# Patient Record
Sex: FEMALE | Race: BLACK OR AFRICAN AMERICAN | NOT HISPANIC OR LATINO | Employment: FULL TIME | URBAN - METROPOLITAN AREA
[De-identification: names, ages, dates, MRNs, and addresses within clinical notes are randomized per-mention and may not be internally consistent; named-entity substitution may affect disease eponyms.]

---

## 2018-08-14 ENCOUNTER — OFFICE VISIT (OUTPATIENT)
Dept: OBSTETRICS AND GYNECOLOGY | Facility: CLINIC | Age: 58
End: 2018-08-14
Payer: MEDICAID

## 2018-08-14 VITALS
HEIGHT: 64 IN | DIASTOLIC BLOOD PRESSURE: 90 MMHG | WEIGHT: 240.31 LBS | SYSTOLIC BLOOD PRESSURE: 140 MMHG | BODY MASS INDEX: 41.03 KG/M2

## 2018-08-14 DIAGNOSIS — Z01.419 ENCOUNTER FOR GYNECOLOGICAL EXAMINATION WITHOUT ABNORMAL FINDING: Primary | ICD-10-CM

## 2018-08-14 DIAGNOSIS — Z12.31 VISIT FOR SCREENING MAMMOGRAM: ICD-10-CM

## 2018-08-14 PROCEDURE — 88175 CYTOPATH C/V AUTO FLUID REDO: CPT

## 2018-08-14 PROCEDURE — 99999 PR PBB SHADOW E&M-NEW PATIENT-LVL III: CPT | Mod: PBBFAC,,, | Performed by: OBSTETRICS & GYNECOLOGY

## 2018-08-14 PROCEDURE — 99386 PREV VISIT NEW AGE 40-64: CPT | Mod: S$PBB,,, | Performed by: OBSTETRICS & GYNECOLOGY

## 2018-08-14 PROCEDURE — 99203 OFFICE O/P NEW LOW 30 MIN: CPT | Mod: PBBFAC | Performed by: OBSTETRICS & GYNECOLOGY

## 2018-08-14 RX ORDER — HYDROCHLOROTHIAZIDE 12.5 MG/1
12.5 CAPSULE ORAL DAILY
COMMUNITY
End: 2021-08-06 | Stop reason: ALTCHOICE

## 2018-08-14 RX ORDER — AMLODIPINE BESYLATE AND ATORVASTATIN CALCIUM 10; 10 MG/1; MG/1
1 TABLET, FILM COATED ORAL DAILY
COMMUNITY
End: 2021-08-06

## 2018-08-14 RX ORDER — FLUTICASONE PROPIONATE 220 UG/1
1 AEROSOL, METERED RESPIRATORY (INHALATION) 2 TIMES DAILY
COMMUNITY
End: 2021-08-06 | Stop reason: SDUPTHER

## 2018-08-14 RX ORDER — ANASTROZOLE 1 MG/1
1 TABLET ORAL DAILY
COMMUNITY
End: 2024-01-05 | Stop reason: SDUPTHER

## 2018-08-20 NOTE — PROGRESS NOTES
HISTORY OF PRESENT ILLNESS:    Yeny Hargrove is a 58 y.o. female,   presents today for her routine visit and has no complaints.      Past Medical History:   Diagnosis Date    Asthma     Breast cancer     Hypertension        Past Surgical History:   Procedure Laterality Date    BREAST LUMPECTOMY      HYSTERECTOMY      MASTECTOMY         MEDICATIONS AND ALLERGIES:      Current Outpatient Medications:     amlodipine-atorvastatin (CADUET) 10-10 mg per tablet, Take 1 tablet by mouth once daily., Disp: , Rfl:     anastrozole (ARIMIDEX) 1 mg Tab, Take 1 mg by mouth once daily., Disp: , Rfl:     fluticasone (FLOVENT HFA) 220 mcg/actuation inhaler, Inhale 1 puff into the lungs 2 (two) times daily. Controller, Disp: , Rfl:     hydroCHLOROthiazide (MICROZIDE) 12.5 mg capsule, Take 12.5 mg by mouth once daily., Disp: , Rfl:     tiotropium Br/olodaterol HCl (STIOLTO RESPIMAT INHL), Inhale into the lungs., Disp: , Rfl:     Review of patient's allergies indicates:   Allergen Reactions    Iodinated contrast- oral and iv dye Swelling    Pcn [penicillins] Swelling       Family History   Problem Relation Age of Onset    Eclampsia Neg Hx     Diabetes Neg Hx     Colon cancer Neg Hx     Cancer Neg Hx     Breast cancer Neg Hx     Hypertension Neg Hx     Miscarriages / Stillbirths Neg Hx     Ovarian cancer Neg Hx      labor Neg Hx     Stroke Neg Hx        Social History     Socioeconomic History    Marital status: Single     Spouse name: Not on file    Number of children: Not on file    Years of education: Not on file    Highest education level: Not on file   Social Needs    Financial resource strain: Not on file    Food insecurity - worry: Not on file    Food insecurity - inability: Not on file    Transportation needs - medical: Not on file    Transportation needs - non-medical: Not on file   Occupational History    Not on file   Tobacco Use    Smoking status: Never Smoker    Smokeless tobacco:  "Never Used   Substance and Sexual Activity    Alcohol use: No     Frequency: Never    Drug use: No    Sexual activity: No     Birth control/protection: None   Other Topics Concern    Not on file   Social History Narrative    Not on file       COMPREHENSIVE GYN HISTORY:  PAP History: Denies abnormal Paps.  Infection History: Denies STDs. Denies PID.  Benign History: Denies uterine fibroids. Denies ovarian cysts. Denies endometriosis. Denies other conditions.  Cancer History: Denies cervical cancer. Denies uterine cancer or hyperplasia. Denies ovarian cancer. Denies vulvar cancer or pre-cancer. Denies vaginal cancer or pre-cancer. Denies breast cancer. Denies colon cancer.  Sexual Activity History: Reports currently being sexually active  Menstrual History: Denies menses. Pt is  not on ERT.     ROS:  GENERAL: No weight changes. No swelling. No fatigue. No fever.  CARDIOVASCULAR: No chest pain. No shortness of breath. No leg cramps.   NEUROLOGICAL: No headaches. No vision changes.  BREASTS: No pain. No lumps. No discharge.  ABDOMEN: No pain. No nausea. No vomiting. No diarrhea. No constipation.  REPRODUCTIVE: No abnormal bleeding.  VULVA: No pain. No lesions. No itching.  VAGINA: No relaxation. No itching. No odor. No discharge. No lesions.  URINARY: No incontinence. No nocturia. No frequency. No dysuria.    BP (!) 140/90   Ht 5' 4" (1.626 m)   Wt 109 kg (240 lb 4.8 oz)   LMP  (LMP Unknown)   BMI 41.25 kg/m²     PE:  APPEARANCE: Well nourished, well developed, in no acute distress.  AFFECT: WNL, alert and oriented x 3.  SKIN: No acne or hirsutism.  NECK: Neck symmetric without masses or thyromegaly.  NODES: No inguinal, cervical, axillary or femoral lymph node enlargement.  CHEST: Good respiratory effort.   ABDOMEN: Soft. No tenderness or masses. No hepatosplenomegaly. No hernias.  BREASTS: Symmetrical, no skin changes or visible lesions. No palpable masses, nipple discharge bilaterally.  PELVIC: ATROPHIC " EXTERNAL FEMALE GENITALIA without lesions. Normal hair distribution. Adequate perineal body, normal urethral meatus. VAGINA DRY without lesions or discharge. No significant cystocele or rectocele. Bimanual exam shows CERVIX and UTERUS to be SURGICALLY ABSENT. Adnexa without masses or tenderness.  EXTREMITIES: No edema.    DIAGNOSIS:  1. Encounter for gynecological examination without abnormal finding    2. Visit for screening mammogram        Orders Placed This Encounter    Mammo Digital Screening Bilat with CAD    Liquid-based pap smear, screening       COUNSELING:  The patient was counseled today on  -osteoporosis prevention, calcium supplementation, regular weight bearing exercise.  -ACS PAP guidelines (no paps), with recommendations for yearly pelvic exams as her uterus and cervix were removed for benign reasons and ovaries remain;  -recommendation for yearly mammogram;  -to see her primary care physician for all other health maintenance.    FOLLOW-UP with me for next routine visit.

## 2019-02-25 DIAGNOSIS — M25.559 HIP PAIN: Primary | ICD-10-CM

## 2019-02-25 DIAGNOSIS — M19.90 OSTEOARTHRITIS: ICD-10-CM

## 2019-02-25 DIAGNOSIS — M54.50 LOW BACK PAIN: ICD-10-CM

## 2019-02-25 DIAGNOSIS — M25.569 KNEE PAIN: ICD-10-CM

## 2021-08-06 ENCOUNTER — OFFICE VISIT (OUTPATIENT)
Dept: CARDIOLOGY | Facility: CLINIC | Age: 61
End: 2021-08-06
Payer: MEDICAID

## 2021-08-06 VITALS
DIASTOLIC BLOOD PRESSURE: 92 MMHG | HEIGHT: 64 IN | WEIGHT: 220.88 LBS | BODY MASS INDEX: 37.71 KG/M2 | OXYGEN SATURATION: 97 % | HEART RATE: 90 BPM | SYSTOLIC BLOOD PRESSURE: 140 MMHG

## 2021-08-06 DIAGNOSIS — E11.59 HYPERTENSION ASSOCIATED WITH DIABETES: ICD-10-CM

## 2021-08-06 DIAGNOSIS — R60.9 SWELLING: ICD-10-CM

## 2021-08-06 DIAGNOSIS — E78.5 HYPERLIPIDEMIA ASSOCIATED WITH TYPE 2 DIABETES MELLITUS: ICD-10-CM

## 2021-08-06 DIAGNOSIS — E11.69 HYPERLIPIDEMIA ASSOCIATED WITH TYPE 2 DIABETES MELLITUS: ICD-10-CM

## 2021-08-06 DIAGNOSIS — E11.9 DIABETES MELLITUS WITHOUT COMPLICATION: ICD-10-CM

## 2021-08-06 DIAGNOSIS — R06.02 SOB (SHORTNESS OF BREATH): Primary | ICD-10-CM

## 2021-08-06 DIAGNOSIS — E66.01 MORBID OBESITY: ICD-10-CM

## 2021-08-06 DIAGNOSIS — I15.2 HYPERTENSION ASSOCIATED WITH DIABETES: ICD-10-CM

## 2021-08-06 PROCEDURE — 99205 PR OFFICE/OUTPT VISIT, NEW, LEVL V, 60-74 MIN: ICD-10-PCS | Mod: S$GLB,,, | Performed by: INTERNAL MEDICINE

## 2021-08-06 PROCEDURE — 99205 OFFICE O/P NEW HI 60 MIN: CPT | Mod: S$GLB,,, | Performed by: INTERNAL MEDICINE

## 2021-08-06 RX ORDER — LORATADINE 10 MG/1
10 TABLET ORAL DAILY
COMMUNITY
Start: 2021-07-02

## 2021-08-06 RX ORDER — METFORMIN HYDROCHLORIDE 500 MG/1
500 TABLET ORAL 2 TIMES DAILY
COMMUNITY
Start: 2021-07-27

## 2021-08-06 RX ORDER — AMITRIPTYLINE HYDROCHLORIDE 25 MG/1
25 TABLET, FILM COATED ORAL NIGHTLY PRN
COMMUNITY

## 2021-08-06 RX ORDER — PROMETHAZINE HYDROCHLORIDE 6.25 MG/5ML
5 SYRUP ORAL
COMMUNITY
End: 2021-11-12

## 2021-08-06 RX ORDER — ALBUTEROL SULFATE 90 UG/1
AEROSOL, METERED RESPIRATORY (INHALATION)
COMMUNITY
End: 2024-01-05 | Stop reason: SDUPTHER

## 2021-08-06 RX ORDER — ALBUTEROL SULFATE 90 UG/1
AEROSOL, METERED RESPIRATORY (INHALATION)
COMMUNITY

## 2021-08-06 RX ORDER — DICLOFENAC SODIUM 50 MG/1
50 TABLET, DELAYED RELEASE ORAL 2 TIMES DAILY
COMMUNITY
Start: 2021-07-27

## 2021-08-06 RX ORDER — OMEPRAZOLE 40 MG/1
40 CAPSULE, DELAYED RELEASE ORAL DAILY
COMMUNITY
Start: 2021-04-01

## 2021-08-06 RX ORDER — ATORVASTATIN CALCIUM 20 MG/1
20 TABLET, FILM COATED ORAL DAILY
Qty: 30 TABLET | Refills: 11 | Status: SHIPPED | OUTPATIENT
Start: 2021-08-06 | End: 2021-09-29

## 2021-08-06 RX ORDER — TIOTROPIUM BROMIDE AND OLODATEROL 3.124; 2.736 UG/1; UG/1
SPRAY, METERED RESPIRATORY (INHALATION)
COMMUNITY
Start: 2021-03-28

## 2021-08-06 RX ORDER — FLUTICASONE PROPIONATE 50 MCG
SPRAY, SUSPENSION (ML) NASAL
COMMUNITY

## 2021-08-06 RX ORDER — BENZONATATE 100 MG/1
CAPSULE ORAL
COMMUNITY

## 2021-08-06 RX ORDER — FLUTICASONE PROPIONATE 220 UG/1
AEROSOL, METERED RESPIRATORY (INHALATION)
COMMUNITY

## 2021-08-06 RX ORDER — AMLODIPINE BESYLATE 10 MG/1
TABLET ORAL
COMMUNITY
Start: 2021-07-27

## 2021-08-06 RX ORDER — HYDROCHLOROTHIAZIDE 25 MG/1
TABLET ORAL
COMMUNITY
End: 2023-05-26

## 2021-08-06 RX ORDER — FERROUS SULFATE 325(65) MG
TABLET ORAL
COMMUNITY

## 2021-09-22 ENCOUNTER — TELEPHONE (OUTPATIENT)
Dept: CARDIOLOGY | Facility: CLINIC | Age: 61
End: 2021-09-22

## 2021-09-24 ENCOUNTER — HOSPITAL ENCOUNTER (OUTPATIENT)
Dept: CARDIOLOGY | Facility: CLINIC | Age: 61
Discharge: HOME | End: 2021-09-24
Payer: MEDICAID

## 2021-09-24 ENCOUNTER — HOSPITAL ENCOUNTER (OUTPATIENT)
Dept: CARDIOLOGY | Facility: HOSPITAL | Age: 61
Discharge: HOME | End: 2021-09-24
Attending: INTERNAL MEDICINE
Payer: MEDICAID

## 2021-09-24 VITALS
WEIGHT: 220 LBS | HEART RATE: 97 BPM | DIASTOLIC BLOOD PRESSURE: 63 MMHG | HEIGHT: 64 IN | BODY MASS INDEX: 37.56 KG/M2 | SYSTOLIC BLOOD PRESSURE: 130 MMHG

## 2021-09-24 VITALS
HEIGHT: 64 IN | DIASTOLIC BLOOD PRESSURE: 100 MMHG | SYSTOLIC BLOOD PRESSURE: 150 MMHG | BODY MASS INDEX: 37.56 KG/M2 | HEART RATE: 86 BPM | WEIGHT: 220 LBS

## 2021-09-24 DIAGNOSIS — R06.02 SOB (SHORTNESS OF BREATH): ICD-10-CM

## 2021-09-24 LAB
ASCENDING AORTA: 4.19 CM
AV INDEX (PROSTH): 0.68
AV MEAN GRADIENT: 5 MMHG
AV PEAK GRADIENT: 10 MMHG
AV VALVE AREA: 2.5 CM2
AV VELOCITY RATIO: 0.64
BSA FOR ECHO PROCEDURE: 2.12 M2
CFR FLOW - ANTERIOR: 1.94
CFR FLOW - INFERIOR: 1.93
CFR FLOW - LATERAL: 1.84
CFR FLOW - MAX: 2.97
CFR FLOW - MIN: 1.45
CFR FLOW - SEPTAL: 2.07
CFR FLOW - WHOLE HEART: 1.95
CV ECHO LV RWT: 0.42 CM
CV PHARM DOSE: 56 MG
CV STRESS BASE HR: 97 BPM
DIASTOLIC BLOOD PRESSURE: 63 MMHG
DOP CALC AO PEAK VEL: 1.62 M/S
DOP CALC AO VTI: 32.21 CM
DOP CALC LVOT AREA: 3.7 CM2
DOP CALC LVOT DIAMETER: 2.17 CM
DOP CALC LVOT PEAK VEL: 1.04 M/S
DOP CALC LVOT STROKE VOLUME: 80.55 CM3
DOP CALCLVOT PEAK VEL VTI: 21.79 CM
E WAVE DECELERATION TIME: 197.92 MSEC
E/A RATIO: 0.71
E/E' RATIO: 8.13 M/S
ECHO LV POSTERIOR WALL: 1 CM (ref 0.6–1.1)
EJECTION FRACTION- HIGH: 65 %
EJECTION FRACTION: 63 %
END DIASTOLIC INDEX-HIGH: 153 ML/M2
END DIASTOLIC INDEX-LOW: 93 ML/M2
END SYSTOLIC INDEX-HIGH: 71 ML/M2
END SYSTOLIC INDEX-LOW: 31 ML/M2
FRACTIONAL SHORTENING: 28 % (ref 28–44)
INTERVENTRICULAR SEPTUM: 1.13 CM (ref 0.6–1.1)
LA MAJOR: 6.44 CM
LA MINOR: 6.07 CM
LA WIDTH: 3.99 CM
LEFT ATRIUM SIZE: 4.21 CM
LEFT ATRIUM VOLUME INDEX MOD: 36 ML/M2
LEFT ATRIUM VOLUME INDEX: 43.7 ML/M2
LEFT ATRIUM VOLUME MOD: 73.4 CM3
LEFT ATRIUM VOLUME: 89.23 CM3
LEFT INTERNAL DIMENSION IN SYSTOLE: 3.41 CM (ref 2.1–4)
LEFT VENTRICLE DIASTOLIC VOLUME INDEX: 50.74 ML/M2
LEFT VENTRICLE DIASTOLIC VOLUME: 103.51 ML
LEFT VENTRICLE MASS INDEX: 88 G/M2
LEFT VENTRICLE SYSTOLIC VOLUME INDEX: 23.3 ML/M2
LEFT VENTRICLE SYSTOLIC VOLUME: 47.61 ML
LEFT VENTRICULAR INTERNAL DIMENSION IN DIASTOLE: 4.72 CM (ref 3.5–6)
LEFT VENTRICULAR MASS: 180.53 G
LV LATERAL E/E' RATIO: 7.22 M/S
LV SEPTAL E/E' RATIO: 9.29 M/S
MV A" WAVE DURATION": 10.66 MSEC
MV PEAK A VEL: 0.91 M/S
MV PEAK E VEL: 0.65 M/S
MV STENOSIS PRESSURE HALF TIME: 57.4 MS
MV VALVE AREA P 1/2 METHOD: 3.83 CM2
NUC REST DIASTOLIC VOLUME INDEX: 103
NUC REST EJECTION FRACTION: 70
NUC REST SYSTOLIC VOLUME INDEX: 31
NUC STRESS DIASTOLIC VOLUME INDEX: 109
NUC STRESS EJECTION FRACTION: 73 %
NUC STRESS SYSTOLIC VOLUME INDEX: 33
OHS CV CPX 85 PERCENT MAX PREDICTED HEART RATE MALE: 129
OHS CV CPX MAX PREDICTED HEART RATE: 152
OHS CV CPX PATIENT IS FEMALE: 1
OHS CV CPX PATIENT IS MALE: 0
OHS CV CPX PEAK DIASTOLIC BLOOD PRESSURE: 99 MMHG
OHS CV CPX PEAK HEAR RATE: 107 BPM
OHS CV CPX PEAK RATE PRESSURE PRODUCT: NORMAL
OHS CV CPX PEAK SYSTOLIC BLOOD PRESSURE: 154 MMHG
OHS CV CPX PERCENT MAX PREDICTED HEART RATE ACHIEVED: 70
OHS CV CPX RATE PRESSURE PRODUCT PRESENTING: NORMAL
PISA TR MAX VEL: 2.64 M/S
PULM VEIN S/D RATIO: 1.64
PV PEAK D VEL: 0.28 M/S
PV PEAK S VEL: 0.46 M/S
RA MAJOR: 4.96 CM
RA PRESSURE: 3 MMHG
RA WIDTH: 2.91 CM
REST FLOW - ANTERIOR: 1.92 CC/MIN/G
REST FLOW - INFERIOR: 1.6 CC/MIN/G
REST FLOW - LATERAL: 1.86 CC/MIN/G
REST FLOW - MAX: 2.49 CC/MIN/G
REST FLOW - MIN: 0.8 CC/MIN/G
REST FLOW - SEPTAL: 1.59 CC/MIN/G
REST FLOW - WHOLE HEART: 1.74 CC/MIN/G
RETIRED EF AND QEF - SEE NOTES: 53 %
RIGHT VENTRICULAR END-DIASTOLIC DIMENSION: 2.87 CM
SINUS: 3.19 CM
STJ: 3.72 CM
STRESS FLOW - ANTERIOR: 3.68 CC/MIN/G
STRESS FLOW - INFERIOR: 3.07 CC/MIN/G
STRESS FLOW - LATERAL: 3.41 CC/MIN/G
STRESS FLOW - MAX: 4.63 CC/MIN/G
STRESS FLOW - MIN: 2.04 CC/MIN/G
STRESS FLOW - SEPTAL: 3.23 CC/MIN/G
STRESS FLOW - WHOLE HEART: 3.35 CC/MIN/G
SYSTOLIC BLOOD PRESSURE: 130 MMHG
TDI LATERAL: 0.09 M/S
TDI SEPTAL: 0.07 M/S
TDI: 0.08 M/S
TR MAX PG: 28 MMHG
TRICUSPID ANNULAR PLANE SYSTOLIC EXCURSION: 1.46 CM
TV REST PULMONARY ARTERY PRESSURE: 31 MMHG

## 2021-09-24 PROCEDURE — 93016 CARDIAC PET SCAN STRESS (CUPID ONLY): ICD-10-PCS | Mod: ,,, | Performed by: INTERNAL MEDICINE

## 2021-09-24 PROCEDURE — 63600175 PHARM REV CODE 636 W HCPCS: Performed by: INTERNAL MEDICINE

## 2021-09-24 PROCEDURE — 93018 CARDIAC PET SCAN STRESS (CUPID ONLY): ICD-10-PCS | Mod: ,,, | Performed by: INTERNAL MEDICINE

## 2021-09-24 PROCEDURE — 93306 TTE W/DOPPLER COMPLETE: CPT

## 2021-09-24 PROCEDURE — 93016 CV STRESS TEST SUPVJ ONLY: CPT | Mod: ,,, | Performed by: INTERNAL MEDICINE

## 2021-09-24 PROCEDURE — 93018 CV STRESS TEST I&R ONLY: CPT | Mod: ,,, | Performed by: INTERNAL MEDICINE

## 2021-09-24 PROCEDURE — 78434 CARDIAC PET SCAN STRESS (CUPID ONLY): ICD-10-PCS | Mod: 26,,, | Performed by: INTERNAL MEDICINE

## 2021-09-24 PROCEDURE — 78431 MYOCRD IMG PET RST&STRS CT: CPT | Mod: 26,,, | Performed by: INTERNAL MEDICINE

## 2021-09-24 PROCEDURE — 93306 TTE W/DOPPLER COMPLETE: CPT | Mod: 26,,, | Performed by: INTERNAL MEDICINE

## 2021-09-24 PROCEDURE — 78434 AQMBF PET REST & RX STRESS: CPT

## 2021-09-24 PROCEDURE — 78434 AQMBF PET REST & RX STRESS: CPT | Mod: 26,,, | Performed by: INTERNAL MEDICINE

## 2021-09-24 PROCEDURE — 93306 ECHO (CUPID ONLY): ICD-10-PCS | Mod: 26,,, | Performed by: INTERNAL MEDICINE

## 2021-09-24 PROCEDURE — 78431 CARDIAC PET SCAN STRESS (CUPID ONLY): ICD-10-PCS | Mod: 26,,, | Performed by: INTERNAL MEDICINE

## 2021-09-24 RX ORDER — DIPYRIDAMOLE 5 MG/ML
56 INJECTION INTRAVENOUS ONCE
Status: COMPLETED | OUTPATIENT
Start: 2021-09-24 | End: 2021-09-24

## 2021-09-24 RX ADMIN — DIPYRIDAMOLE 56 MG: 5 INJECTION INTRAVENOUS at 11:09

## 2021-09-29 ENCOUNTER — TELEPHONE (OUTPATIENT)
Dept: CARDIOLOGY | Facility: CLINIC | Age: 61
End: 2021-09-29

## 2021-09-29 RX ORDER — ATORVASTATIN CALCIUM 80 MG/1
80 TABLET, FILM COATED ORAL DAILY
Qty: 30 TABLET | Refills: 11 | Status: SHIPPED | OUTPATIENT
Start: 2021-09-29 | End: 2022-12-08 | Stop reason: SDUPTHER

## 2021-11-12 ENCOUNTER — OFFICE VISIT (OUTPATIENT)
Dept: CARDIOLOGY | Facility: CLINIC | Age: 61
End: 2021-11-12
Payer: MEDICAID

## 2021-11-12 VITALS
DIASTOLIC BLOOD PRESSURE: 98 MMHG | WEIGHT: 222.69 LBS | HEIGHT: 64 IN | HEART RATE: 95 BPM | BODY MASS INDEX: 38.02 KG/M2 | SYSTOLIC BLOOD PRESSURE: 138 MMHG | OXYGEN SATURATION: 100 %

## 2021-11-12 DIAGNOSIS — Z71.2 ENCOUNTER TO DISCUSS TEST RESULTS: Primary | ICD-10-CM

## 2021-11-12 PROCEDURE — 99499 UNLISTED E&M SERVICE: CPT | Mod: S$GLB,,, | Performed by: INTERNAL MEDICINE

## 2021-11-12 PROCEDURE — 99499 NO LOS: ICD-10-PCS | Mod: S$GLB,,, | Performed by: INTERNAL MEDICINE

## 2021-11-12 RX ORDER — ACETAMINOPHEN AND CODEINE PHOSPHATE 300; 15 MG/1; MG/1
1 TABLET ORAL EVERY 4 HOURS PRN
COMMUNITY
End: 2024-01-05

## 2022-02-09 ENCOUNTER — OUTSIDE PLACE OF SERVICE (OUTPATIENT)
Dept: CARDIOLOGY | Facility: CLINIC | Age: 62
End: 2022-02-09
Payer: MEDICAID

## 2022-02-09 PROCEDURE — 93010 ELECTROCARDIOGRAM REPORT: CPT | Mod: ,,, | Performed by: INTERNAL MEDICINE

## 2022-02-09 PROCEDURE — 93010 PR ELECTROCARDIOGRAM REPORT: ICD-10-PCS | Mod: ,,, | Performed by: INTERNAL MEDICINE

## 2022-02-18 ENCOUNTER — OFFICE VISIT (OUTPATIENT)
Dept: CARDIOLOGY | Facility: CLINIC | Age: 62
End: 2022-02-18
Payer: MEDICAID

## 2022-02-18 VITALS
HEART RATE: 101 BPM | HEIGHT: 64 IN | OXYGEN SATURATION: 100 % | SYSTOLIC BLOOD PRESSURE: 133 MMHG | WEIGHT: 229.63 LBS | BODY MASS INDEX: 39.2 KG/M2 | DIASTOLIC BLOOD PRESSURE: 87 MMHG

## 2022-02-18 DIAGNOSIS — E11.9 DIABETES MELLITUS WITHOUT COMPLICATION: ICD-10-CM

## 2022-02-18 DIAGNOSIS — E78.5 HYPERLIPIDEMIA ASSOCIATED WITH TYPE 2 DIABETES MELLITUS: ICD-10-CM

## 2022-02-18 DIAGNOSIS — R06.02 SOB (SHORTNESS OF BREATH): ICD-10-CM

## 2022-02-18 DIAGNOSIS — E11.59 HYPERTENSION ASSOCIATED WITH DIABETES: ICD-10-CM

## 2022-02-18 DIAGNOSIS — I48.91 ATRIAL FIBRILLATION, UNSPECIFIED TYPE: ICD-10-CM

## 2022-02-18 DIAGNOSIS — I15.2 HYPERTENSION ASSOCIATED WITH DIABETES: ICD-10-CM

## 2022-02-18 DIAGNOSIS — R60.9 SWELLING: ICD-10-CM

## 2022-02-18 DIAGNOSIS — E11.69 HYPERLIPIDEMIA ASSOCIATED WITH TYPE 2 DIABETES MELLITUS: ICD-10-CM

## 2022-02-18 DIAGNOSIS — R00.2 PALPITATIONS: Primary | ICD-10-CM

## 2022-02-18 PROCEDURE — 99215 PR OFFICE/OUTPT VISIT, EST, LEVL V, 40-54 MIN: ICD-10-PCS | Mod: S$GLB,,, | Performed by: INTERNAL MEDICINE

## 2022-02-18 PROCEDURE — 99215 OFFICE O/P EST HI 40 MIN: CPT | Mod: S$GLB,,, | Performed by: INTERNAL MEDICINE

## 2022-02-18 PROCEDURE — 3008F BODY MASS INDEX DOCD: CPT | Mod: CPTII,S$GLB,, | Performed by: INTERNAL MEDICINE

## 2022-02-18 PROCEDURE — 1159F MED LIST DOCD IN RCRD: CPT | Mod: CPTII,S$GLB,, | Performed by: INTERNAL MEDICINE

## 2022-02-18 PROCEDURE — 1160F RVW MEDS BY RX/DR IN RCRD: CPT | Mod: CPTII,S$GLB,, | Performed by: INTERNAL MEDICINE

## 2022-02-18 PROCEDURE — 3008F PR BODY MASS INDEX (BMI) DOCUMENTED: ICD-10-PCS | Mod: CPTII,S$GLB,, | Performed by: INTERNAL MEDICINE

## 2022-02-18 PROCEDURE — 1160F PR REVIEW ALL MEDS BY PRESCRIBER/CLIN PHARMACIST DOCUMENTED: ICD-10-PCS | Mod: CPTII,S$GLB,, | Performed by: INTERNAL MEDICINE

## 2022-02-18 PROCEDURE — 3075F PR MOST RECENT SYSTOLIC BLOOD PRESS GE 130-139MM HG: ICD-10-PCS | Mod: CPTII,S$GLB,, | Performed by: INTERNAL MEDICINE

## 2022-02-18 PROCEDURE — 3075F SYST BP GE 130 - 139MM HG: CPT | Mod: CPTII,S$GLB,, | Performed by: INTERNAL MEDICINE

## 2022-02-18 PROCEDURE — 3079F PR MOST RECENT DIASTOLIC BLOOD PRESSURE 80-89 MM HG: ICD-10-PCS | Mod: CPTII,S$GLB,, | Performed by: INTERNAL MEDICINE

## 2022-02-18 PROCEDURE — 1159F PR MEDICATION LIST DOCUMENTED IN MEDICAL RECORD: ICD-10-PCS | Mod: CPTII,S$GLB,, | Performed by: INTERNAL MEDICINE

## 2022-02-18 PROCEDURE — 3079F DIAST BP 80-89 MM HG: CPT | Mod: CPTII,S$GLB,, | Performed by: INTERNAL MEDICINE

## 2022-02-18 RX ORDER — PROMETHAZINE HYDROCHLORIDE AND CODEINE PHOSPHATE 6.25; 1 MG/5ML; MG/5ML
5 SOLUTION ORAL
COMMUNITY

## 2022-02-18 RX ORDER — BACLOFEN 5 MG/1
TABLET ORAL
COMMUNITY

## 2022-02-18 RX ORDER — METOPROLOL SUCCINATE 25 MG/1
25 TABLET, EXTENDED RELEASE ORAL DAILY
Qty: 30 TABLET | Refills: 11 | Status: SHIPPED | OUTPATIENT
Start: 2022-02-18 | End: 2022-03-11

## 2022-02-18 RX ORDER — AMOXICILLIN 875 MG/1
875 TABLET, FILM COATED ORAL 2 TIMES DAILY
COMMUNITY
Start: 2022-02-01 | End: 2024-01-05

## 2022-02-18 RX ORDER — FUROSEMIDE 20 MG/1
TABLET ORAL
COMMUNITY
End: 2022-10-07

## 2022-02-18 NOTE — PROGRESS NOTES
Cardiology Clinic note    Subjective:   Patient ID:  Yeny Hargrove is a 62 y.o. female who presents for irregular heart beat evaluation    HPI:   SOB, chest discomfort  She reports this can be related to exertion, but not always the case. End of January 2021 had COVID, and since then breathing has been difficult. Has been following with pulmonologist for SOB as she has h/o asthma.    Palpitations  Reports was told has irregular heart beat. EKG this month from [facility] ED reviewed. Noted PACs. No Afib. Was told by social security physician she has an irregular heart beat, but reports no EKG was done.    HTN: On medications    HLD: Tolerating statin    SH Tobacco Never used  FH No premature CAD    There are no problems to display for this patient.  PMH  DM  HTN  HLD    Patient's Medications   New Prescriptions    METOPROLOL SUCCINATE (TOPROL-XL) 25 MG 24 HR TABLET    Take 1 tablet (25 mg total) by mouth once daily.    RIVAROXABAN (XARELTO) 20 MG TAB    Take 1 tablet (20 mg total) by mouth once daily.   Previous Medications    ACETAMINOPHEN-CODEINE 300-15MG (TYLENOL #2) 300-15 MG PER TABLET    Take 1 tablet by mouth every 4 (four) hours as needed for Pain.    ALBUTEROL (PROVENTIL/VENTOLIN HFA) 90 MCG/ACTUATION INHALER    Ventolin HFA 90 mcg/actuation aerosol inhaler   Inhale 2 puffs 4 times a day by inhalation route.    ALBUTEROL (PROVENTIL/VENTOLIN HFA) 90 MCG/ACTUATION INHALER    albuterol sulfate HFA 90 mcg/actuation aerosol inhaler    ALLERGY RELIEF, LORATADINE, 10 MG TABLET    Take 10 mg by mouth once daily.    AMITRIPTYLINE (ELAVIL) 25 MG TABLET    Take 25 mg by mouth nightly as needed for Insomnia.    AMLODIPINE (NORVASC) 10 MG TABLET        AMOXICILLIN (AMOXIL) 875 MG TABLET    Take 875 mg by mouth 2 (two) times daily.    ANASTROZOLE (ARIMIDEX) 1 MG TAB    Take 1 mg by mouth once daily.    ATORVASTATIN (LIPITOR) 80 MG TABLET    Take 1 tablet (80 mg total) by mouth once daily.    BACLOFEN (LIORESAL) 5 MG TAB  TABLET    baclofen 5 mg tablet    BENZONATATE (TESSALON) 100 MG CAPSULE    Tessalon Perles 100 mg capsule   Take 1 capsule every day by oral route for 30 days.    DICLOFENAC (VOLTAREN) 50 MG EC TABLET    Take 50 mg by mouth 2 (two) times daily.    FERROUS SULFATE (FEOSOL) 325 MG (65 MG IRON) TAB TABLET    FeroSul 325 mg (65 mg iron) tablet    FLUTICASONE PROPIONATE (FLONASE) 50 MCG/ACTUATION NASAL SPRAY    fluticasone propionate 50 mcg/actuation nasal spray,suspension    FLUTICASONE PROPIONATE (FLOVENT HFA) 220 MCG/ACTUATION INHALER    Flovent  mcg/actuation aerosol inhaler   Inhale 2 puffs twice a day by inhalation route for 30 days.    FUROSEMIDE (LASIX) 20 MG TABLET    furosemide 20 mg tablet    HYDROCHLOROTHIAZIDE (HYDRODIURIL) 25 MG TABLET    hydrochlorothiazide 25 mg tablet   Take 1 tablet every day by oral route for 30 days.    LINACLOTIDE (LINZESS) 145 MCG CAP CAPSULE    Linzess 145 mcg capsule   Take 1 capsule every day by oral route in the morning for 90 days.    METFORMIN (GLUCOPHAGE) 500 MG TABLET    Take 500 mg by mouth 2 (two) times daily.    OMEPRAZOLE (PRILOSEC) 40 MG CAPSULE    Take 40 mg by mouth once daily.    PROMETHAZINE-CODEINE 6.25-10 MG/5 ML (PHENERGAN WITH CODEINE) 6.25-10 MG/5 ML SYRUP    5 mLs.    STIOLTO RESPIMAT 2.5-2.5 MCG/ACTUATION MIST    SMARTSI Puff(s) Via Inhaler Daily   Modified Medications    No medications on file   Discontinued Medications    No medications on file        Review of Systems   Constitutional: Negative for fever.   HENT: Negative for nosebleeds.    Cardiovascular: Negative for leg swelling and syncope.        As above   Respiratory: Negative for hemoptysis.    Hematologic/Lymphatic: Negative for bleeding problem.   Musculoskeletal: Positive for arthritis.   Gastrointestinal: Negative for hematochezia.   Genitourinary: Negative for hematuria.   Neurological: Negative for seizures.   Allergic/Immunologic: Positive for environmental allergies.  "        Objective:   Vitals  Vitals:    02/18/22 0846   BP: 133/87   Pulse: 101   SpO2: 100%   Weight: 104.1 kg (229 lb 9.6 oz)   Height: 5' 4" (1.626 m)          Physical Exam  Constitutional:       General: She is not in acute distress.     Appearance: She is well-developed. She is not diaphoretic.   HENT:      Head: Normocephalic.   Neck:      Vascular: No JVD.   Cardiovascular:      Rate and Rhythm: Normal rate. Rhythm irregular.      Heart sounds: No murmur heard.  No friction rub. No gallop.    Pulmonary:      Effort: Pulmonary effort is normal. No respiratory distress.      Breath sounds: Normal breath sounds.   Abdominal:      Palpations: Abdomen is soft.      Tenderness: There is no abdominal tenderness.   Musculoskeletal:         General: No swelling.      Cervical back: Normal range of motion.   Skin:     General: Skin is warm.   Neurological:      Mental Status: She is alert.   Psychiatric:         Mood and Affect: Mood normal.             Assessment:     1. Palpitations    2. SOB (shortness of breath)    3. Diabetes mellitus without complication    4. Hypertension associated with diabetes    5. Hyperlipidemia associated with type 2 diabetes mellitus    6. Swelling    7. Atrial fibrillation, unspecified type        Plan:   Yeny Hargrove is a 62 y.o. female h/o DM, HTN, HLD  Asthma  Breast cancer    Used to follow with Dr Sharp (used to be in [place]- now moved [place]). She wanted to stay local and made this appointment.    Reports had a stress test some years back which she reports she had a scar attributed to her prior radiation treatment (h/o breast cancer 2007 XRT, sp mastectomy 2018)    EKG personally reviewed. My interpretation:  2/18/22: Afib, normal axis. HR 110s. Septal infarct. Non-sp ST-T abn. QTc 473  8/6/21: SR 80s, normal axis. PAC noted. Otherwise normal EKG. QTc 445    Palpitations  - CHADS2-VASc 3 (HTN, DM, female)  - Rivaroxaban  - TAYLA/DCCV discussed - she would like to defer for now. " "FUP 2 weeks with EKG. Reconsider TAYLA/DCCV  - BB given    SOB, chest discomfort  - Echo 2017: LVEF 60-65%, DD1. Normal RVSF. PASP 43. Mild MR, TR  - Echo 2021: LVEF 63%, DD indeterminate. Normal RVSF. Mod LAE, mild AGUSTÍN. PASP 31, CVP 3. Asc aorta mildly dilated. Trivial pericardial effusion  - PET stress test 2021: No ischemia. CT attenuation images demonstrate mild diffuse coronary calcifications in the LAD and RCA territory.  - Discussed angiogram for persistent symptoms. She is not in favor of this. Does report decline in functional capacity    Ascending aorta mildly dilated  Repeat echo Sept 2022    DM  No results found for: XOZH4OHDQ, HGBA1C, HGBA1C, HGBGLY, HGBA1C, HGBA1C, HGBA1C, HGBA1C, HGBA1C   Managed by PCP    HTN  BP well controlled  Amlodipine, HCTZ  Salt restriction    HLD  No results found for: LDLCALC, LDLEXT   Statin as above  Fasting lipid profile ordered    Obesity  Estimated body mass index is 39.41 kg/m² as calculated from the following:    Height as of this encounter: 5' 4" (1.626 m).    Weight as of this encounter: 104.1 kg (229 lb 9.6 oz).  Discussed diet and exercise    Continue with current medical plan and lifestyle changes.    Orders Placed This Encounter   Procedures    Lipid Panel     Standing Status:   Future     Standing Expiration Date:   4/19/2023    Cardiac event monitor     Standing Status:   Future     Standing Expiration Date:   2/18/2023     Order Specific Question:   Cardiac Event Monitor     Answer:   Auto Trigger     Order Specific Question:   Release to patient     Answer:   Immediate       Follow up as scheduled  Return sooner for concerns or questions. If symptoms persist go to the ED    She expressed verbal understanding and agreed with the plan      Shanika Benavidez MD  Interventional Cardiology  [facility]  Phone: [phone]    "

## 2022-02-18 NOTE — H&P (VIEW-ONLY)
Cardiology Clinic note    Subjective:   Patient ID:  Yeny Hargrove is a 62 y.o. female who presents for irregular heart beat evaluation    HPI:   SOB, chest discomfort  She reports this can be related to exertion, but not always the case. End of January 2021 had COVID, and since then breathing has been difficult. Has been following with pulmonologist for SOB as she has h/o asthma.    Palpitations  Reports was told has irregular heart beat. EKG this month from [facility] ED reviewed. Noted PACs. No Afib. Was told by social security physician she has an irregular heart beat, but reports no EKG was done.    HTN: On medications    HLD: Tolerating statin    SH Tobacco Never used  FH No premature CAD    There are no problems to display for this patient.  PMH  DM  HTN  HLD    Patient's Medications   New Prescriptions    METOPROLOL SUCCINATE (TOPROL-XL) 25 MG 24 HR TABLET    Take 1 tablet (25 mg total) by mouth once daily.    RIVAROXABAN (XARELTO) 20 MG TAB    Take 1 tablet (20 mg total) by mouth once daily.   Previous Medications    ACETAMINOPHEN-CODEINE 300-15MG (TYLENOL #2) 300-15 MG PER TABLET    Take 1 tablet by mouth every 4 (four) hours as needed for Pain.    ALBUTEROL (PROVENTIL/VENTOLIN HFA) 90 MCG/ACTUATION INHALER    Ventolin HFA 90 mcg/actuation aerosol inhaler   Inhale 2 puffs 4 times a day by inhalation route.    ALBUTEROL (PROVENTIL/VENTOLIN HFA) 90 MCG/ACTUATION INHALER    albuterol sulfate HFA 90 mcg/actuation aerosol inhaler    ALLERGY RELIEF, LORATADINE, 10 MG TABLET    Take 10 mg by mouth once daily.    AMITRIPTYLINE (ELAVIL) 25 MG TABLET    Take 25 mg by mouth nightly as needed for Insomnia.    AMLODIPINE (NORVASC) 10 MG TABLET        AMOXICILLIN (AMOXIL) 875 MG TABLET    Take 875 mg by mouth 2 (two) times daily.    ANASTROZOLE (ARIMIDEX) 1 MG TAB    Take 1 mg by mouth once daily.    ATORVASTATIN (LIPITOR) 80 MG TABLET    Take 1 tablet (80 mg total) by mouth once daily.    BACLOFEN (LIORESAL) 5 MG TAB  TABLET    baclofen 5 mg tablet    BENZONATATE (TESSALON) 100 MG CAPSULE    Tessalon Perles 100 mg capsule   Take 1 capsule every day by oral route for 30 days.    DICLOFENAC (VOLTAREN) 50 MG EC TABLET    Take 50 mg by mouth 2 (two) times daily.    FERROUS SULFATE (FEOSOL) 325 MG (65 MG IRON) TAB TABLET    FeroSul 325 mg (65 mg iron) tablet    FLUTICASONE PROPIONATE (FLONASE) 50 MCG/ACTUATION NASAL SPRAY    fluticasone propionate 50 mcg/actuation nasal spray,suspension    FLUTICASONE PROPIONATE (FLOVENT HFA) 220 MCG/ACTUATION INHALER    Flovent  mcg/actuation aerosol inhaler   Inhale 2 puffs twice a day by inhalation route for 30 days.    FUROSEMIDE (LASIX) 20 MG TABLET    furosemide 20 mg tablet    HYDROCHLOROTHIAZIDE (HYDRODIURIL) 25 MG TABLET    hydrochlorothiazide 25 mg tablet   Take 1 tablet every day by oral route for 30 days.    LINACLOTIDE (LINZESS) 145 MCG CAP CAPSULE    Linzess 145 mcg capsule   Take 1 capsule every day by oral route in the morning for 90 days.    METFORMIN (GLUCOPHAGE) 500 MG TABLET    Take 500 mg by mouth 2 (two) times daily.    OMEPRAZOLE (PRILOSEC) 40 MG CAPSULE    Take 40 mg by mouth once daily.    PROMETHAZINE-CODEINE 6.25-10 MG/5 ML (PHENERGAN WITH CODEINE) 6.25-10 MG/5 ML SYRUP    5 mLs.    STIOLTO RESPIMAT 2.5-2.5 MCG/ACTUATION MIST    SMARTSI Puff(s) Via Inhaler Daily   Modified Medications    No medications on file   Discontinued Medications    No medications on file        Review of Systems   Constitutional: Negative for fever.   HENT: Negative for nosebleeds.    Cardiovascular: Negative for leg swelling and syncope.        As above   Respiratory: Negative for hemoptysis.    Hematologic/Lymphatic: Negative for bleeding problem.   Musculoskeletal: Positive for arthritis.   Gastrointestinal: Negative for hematochezia.   Genitourinary: Negative for hematuria.   Neurological: Negative for seizures.   Allergic/Immunologic: Positive for environmental allergies.  "        Objective:   Vitals  Vitals:    02/18/22 0846   BP: 133/87   Pulse: 101   SpO2: 100%   Weight: 104.1 kg (229 lb 9.6 oz)   Height: 5' 4" (1.626 m)          Physical Exam  Constitutional:       General: She is not in acute distress.     Appearance: She is well-developed. She is not diaphoretic.   HENT:      Head: Normocephalic.   Neck:      Vascular: No JVD.   Cardiovascular:      Rate and Rhythm: Normal rate. Rhythm irregular.      Heart sounds: No murmur heard.  No friction rub. No gallop.    Pulmonary:      Effort: Pulmonary effort is normal. No respiratory distress.      Breath sounds: Normal breath sounds.   Abdominal:      Palpations: Abdomen is soft.      Tenderness: There is no abdominal tenderness.   Musculoskeletal:         General: No swelling.      Cervical back: Normal range of motion.   Skin:     General: Skin is warm.   Neurological:      Mental Status: She is alert.   Psychiatric:         Mood and Affect: Mood normal.             Assessment:     1. Palpitations    2. SOB (shortness of breath)    3. Diabetes mellitus without complication    4. Hypertension associated with diabetes    5. Hyperlipidemia associated with type 2 diabetes mellitus    6. Swelling    7. Atrial fibrillation, unspecified type        Plan:   Yeny Hargrove is a 62 y.o. female h/o DM, HTN, HLD  Asthma  Breast cancer    Used to follow with Dr Sharp (used to be in [place]- now moved [place]). She wanted to stay local and made this appointment.    Reports had a stress test some years back which she reports she had a scar attributed to her prior radiation treatment (h/o breast cancer 2007 XRT, sp mastectomy 2018)    EKG personally reviewed. My interpretation:  2/18/22: Afib, normal axis. HR 110s. Septal infarct. Non-sp ST-T abn. QTc 473  8/6/21: SR 80s, normal axis. PAC noted. Otherwise normal EKG. QTc 445    Palpitations  - CHADS2-VASc 3 (HTN, DM, female)  - Rivaroxaban  - TAYLA/DCCV discussed - she would like to defer for now. " "FUP 2 weeks with EKG. Reconsider TAYLA/DCCV  - BB given    SOB, chest discomfort  - Echo 2017: LVEF 60-65%, DD1. Normal RVSF. PASP 43. Mild MR, TR  - Echo 2021: LVEF 63%, DD indeterminate. Normal RVSF. Mod LAE, mild AGUSTÍN. PASP 31, CVP 3. Asc aorta mildly dilated. Trivial pericardial effusion  - PET stress test 2021: No ischemia. CT attenuation images demonstrate mild diffuse coronary calcifications in the LAD and RCA territory.  - Discussed angiogram for persistent symptoms. She is not in favor of this. Does report decline in functional capacity    Ascending aorta mildly dilated  Repeat echo Sept 2022    DM  No results found for: [id number], HGBA1C, HGBA1C, HGBGLY, HGBA1C, HGBA1C, HGBA1C, HGBA1C, HGBA1C   Managed by PCP    HTN  BP well controlled  Amlodipine, HCTZ  Salt restriction    HLD  No results found for: LDLCALC, LDLEXT   Statin as above  Fasting lipid profile ordered    Obesity  Estimated body mass index is 39.41 kg/m² as calculated from the following:    Height as of this encounter: 5' 4" (1.626 m).    Weight as of this encounter: 104.1 kg (229 lb 9.6 oz).  Discussed diet and exercise    Continue with current medical plan and lifestyle changes.    Orders Placed This Encounter   Procedures    Lipid Panel     Standing Status:   Future     Standing Expiration Date:   4/19/2023    Cardiac event monitor     Standing Status:   Future     Standing Expiration Date:   2/18/2023     Order Specific Question:   Cardiac Event Monitor     Answer:   Auto Trigger     Order Specific Question:   Release to patient     Answer:   Immediate       Follow up as scheduled  Return sooner for concerns or questions. If symptoms persist go to the ED    She expressed verbal understanding and agreed with the plan      Shanika Benavidez MD  Interventional Cardiology  [facility]  Phone: [phone]    "

## 2022-02-21 ENCOUNTER — LAB VISIT (OUTPATIENT)
Dept: LAB | Facility: HOSPITAL | Age: 62
End: 2022-02-21
Attending: INTERNAL MEDICINE
Payer: MEDICAID

## 2022-02-21 DIAGNOSIS — E11.69 HYPERLIPIDEMIA ASSOCIATED WITH TYPE 2 DIABETES MELLITUS: ICD-10-CM

## 2022-02-21 DIAGNOSIS — E78.5 HYPERLIPIDEMIA ASSOCIATED WITH TYPE 2 DIABETES MELLITUS: ICD-10-CM

## 2022-02-21 LAB
CHOLEST SERPL-MCNC: 194 MG/DL (ref 120–199)
CHOLEST/HDLC SERPL: 3.9 {RATIO} (ref 2–5)
HDLC SERPL-MCNC: 50 MG/DL (ref 40–75)
HDLC SERPL: 25.8 % (ref 20–50)
LDLC SERPL CALC-MCNC: 129 MG/DL (ref 63–159)
NONHDLC SERPL-MCNC: 144 MG/DL
TRIGL SERPL-MCNC: 75 MG/DL (ref 30–150)

## 2022-02-21 PROCEDURE — 80061 LIPID PANEL: CPT | Performed by: INTERNAL MEDICINE

## 2022-02-21 PROCEDURE — 36415 COLL VENOUS BLD VENIPUNCTURE: CPT | Mod: PO | Performed by: INTERNAL MEDICINE

## 2022-02-22 ENCOUNTER — TELEPHONE (OUTPATIENT)
Dept: CARDIOLOGY | Facility: CLINIC | Age: 62
End: 2022-02-22
Payer: MEDICAID

## 2022-02-22 NOTE — PROGRESS NOTES
Please call patient re lipid profile - LDL not at goal. Can schedule a clinic visit to discuss medication changes thanks

## 2022-02-22 NOTE — TELEPHONE ENCOUNTER
----- Message from Shanika Benavidez MD sent at 2/22/2022  9:39 AM CST -----  Please call patient re lipid profile - LDL not at goal. Can schedule a clinic visit to discuss medication changes thanks

## 2022-02-22 NOTE — TELEPHONE ENCOUNTER
Called pt to inform of lab results      The pt did not answer, but I left a detailed voice message as well as a call back number.    Pt has follow up scheduled on 03-11-22 at [facility]  Mailed appt reminder to the pt      ND

## 2022-03-11 ENCOUNTER — OFFICE VISIT (OUTPATIENT)
Dept: CARDIOLOGY | Facility: CLINIC | Age: 62
End: 2022-03-11
Payer: MEDICAID

## 2022-03-11 VITALS
DIASTOLIC BLOOD PRESSURE: 85 MMHG | HEIGHT: 64 IN | BODY MASS INDEX: 38.41 KG/M2 | HEART RATE: 110 BPM | OXYGEN SATURATION: 98 % | SYSTOLIC BLOOD PRESSURE: 145 MMHG | WEIGHT: 225 LBS

## 2022-03-11 DIAGNOSIS — I48.0 PAROXYSMAL ATRIAL FIBRILLATION: Primary | ICD-10-CM

## 2022-03-11 PROCEDURE — 99211 OFF/OP EST MAY X REQ PHY/QHP: CPT | Mod: S$GLB,,, | Performed by: INTERNAL MEDICINE

## 2022-03-11 PROCEDURE — 3077F PR MOST RECENT SYSTOLIC BLOOD PRESSURE >= 140 MM HG: ICD-10-PCS | Mod: CPTII,S$GLB,, | Performed by: INTERNAL MEDICINE

## 2022-03-11 PROCEDURE — 3008F PR BODY MASS INDEX (BMI) DOCUMENTED: ICD-10-PCS | Mod: CPTII,S$GLB,, | Performed by: INTERNAL MEDICINE

## 2022-03-11 PROCEDURE — 99211 PR OFFICE/OUTPT VISIT, EST, LEVL I: ICD-10-PCS | Mod: S$GLB,,, | Performed by: INTERNAL MEDICINE

## 2022-03-11 PROCEDURE — 1160F PR REVIEW ALL MEDS BY PRESCRIBER/CLIN PHARMACIST DOCUMENTED: ICD-10-PCS | Mod: CPTII,S$GLB,, | Performed by: INTERNAL MEDICINE

## 2022-03-11 PROCEDURE — 3079F DIAST BP 80-89 MM HG: CPT | Mod: CPTII,S$GLB,, | Performed by: INTERNAL MEDICINE

## 2022-03-11 PROCEDURE — 1159F PR MEDICATION LIST DOCUMENTED IN MEDICAL RECORD: ICD-10-PCS | Mod: CPTII,S$GLB,, | Performed by: INTERNAL MEDICINE

## 2022-03-11 PROCEDURE — 1159F MED LIST DOCD IN RCRD: CPT | Mod: CPTII,S$GLB,, | Performed by: INTERNAL MEDICINE

## 2022-03-11 PROCEDURE — 3079F PR MOST RECENT DIASTOLIC BLOOD PRESSURE 80-89 MM HG: ICD-10-PCS | Mod: CPTII,S$GLB,, | Performed by: INTERNAL MEDICINE

## 2022-03-11 PROCEDURE — 3008F BODY MASS INDEX DOCD: CPT | Mod: CPTII,S$GLB,, | Performed by: INTERNAL MEDICINE

## 2022-03-11 PROCEDURE — 3077F SYST BP >= 140 MM HG: CPT | Mod: CPTII,S$GLB,, | Performed by: INTERNAL MEDICINE

## 2022-03-11 PROCEDURE — 1160F RVW MEDS BY RX/DR IN RCRD: CPT | Mod: CPTII,S$GLB,, | Performed by: INTERNAL MEDICINE

## 2022-03-11 RX ORDER — CARVEDILOL 3.12 MG/1
3.12 TABLET ORAL 2 TIMES DAILY WITH MEALS
Qty: 180 TABLET | Refills: 3 | Status: SHIPPED | OUTPATIENT
Start: 2022-03-11 | End: 2022-04-01

## 2022-03-11 RX ORDER — SODIUM CHLORIDE 0.9 % (FLUSH) 0.9 %
10 SYRINGE (ML) INJECTION
Status: DISCONTINUED | OUTPATIENT
Start: 2022-03-11 | End: 2022-03-11

## 2022-03-11 NOTE — PROGRESS NOTES
Vitals:    03/11/22 0844   BP: (!) 145/85   Pulse: 110     EKG today in clinic with Afib  Discussed DCCV  Has been compliant with rivaroxaban  Orders placed for DCCV    BP runs higher at home  Change metoprolol succinate to carvedilol

## 2022-03-15 ENCOUNTER — LAB VISIT (OUTPATIENT)
Dept: LAB | Facility: HOSPITAL | Age: 62
End: 2022-03-15
Attending: INTERNAL MEDICINE
Payer: MEDICAID

## 2022-03-15 DIAGNOSIS — Z01.810 PRE-OPERATIVE CARDIOVASCULAR EXAMINATION: ICD-10-CM

## 2022-03-15 LAB
ANION GAP SERPL CALC-SCNC: 8 MMOL/L (ref 8–16)
BASOPHILS # BLD AUTO: 0.07 K/UL (ref 0–0.2)
BASOPHILS NFR BLD: 1.2 % (ref 0–1.9)
CALCIUM SERPL-MCNC: 9 MG/DL (ref 8.7–10.5)
CHLORIDE SERPL-SCNC: 104 MMOL/L (ref 95–110)
CO2 SERPL-SCNC: 29 MMOL/L (ref 23–29)
CREAT SERPL-MCNC: 0.99 MG/DL (ref 0.5–1.4)
DIFFERENTIAL METHOD: ABNORMAL
EOSINOPHIL # BLD AUTO: 0.1 K/UL (ref 0–0.5)
EOSINOPHIL NFR BLD: 2.1 % (ref 0–8)
ERYTHROCYTE [DISTWIDTH] IN BLOOD BY AUTOMATED COUNT: 15.9 % (ref 11.5–14.5)
EST. GFR  (AFRICAN AMERICAN): >60 ML/MIN/1.73 M^2
EST. GFR  (NON AFRICAN AMERICAN): >60 ML/MIN/1.73 M^2
GLUCOSE SERPL-MCNC: 112 MG/DL (ref 70–110)
HCT VFR BLD AUTO: 41.3 % (ref 37–48.5)
HGB BLD-MCNC: 12.7 G/DL (ref 12–16)
IMM GRANULOCYTES # BLD AUTO: 0.01 K/UL (ref 0–0.04)
IMM GRANULOCYTES NFR BLD AUTO: 0.2 % (ref 0–0.5)
LYMPHOCYTES # BLD AUTO: 2.4 K/UL (ref 1–4.8)
LYMPHOCYTES NFR BLD: 43.4 % (ref 18–48)
MCH RBC QN AUTO: 26.8 PG (ref 27–31)
MCHC RBC AUTO-ENTMCNC: 30.8 G/DL (ref 32–36)
MCV RBC AUTO: 87 FL (ref 82–98)
MONOCYTES # BLD AUTO: 0.5 K/UL (ref 0.3–1)
MONOCYTES NFR BLD: 8.2 % (ref 4–15)
NEUTROPHILS # BLD AUTO: 2.5 K/UL (ref 1.8–7.7)
NEUTROPHILS NFR BLD: 44.9 % (ref 38–73)
NRBC BLD-RTO: 0 /100 WBC
PLATELET # BLD AUTO: 281 K/UL (ref 150–450)
PMV BLD AUTO: 10.2 FL (ref 9.2–12.9)
POTASSIUM SERPL-SCNC: 4.2 MMOL/L (ref 3.5–5.1)
RBC # BLD AUTO: 4.73 M/UL (ref 4–5.4)
SODIUM SERPL-SCNC: 141 MMOL/L (ref 136–145)
UUN UR-MCNC: 10 MG/DL (ref 7–17)
WBC # BLD AUTO: 5.62 K/UL (ref 3.9–12.7)

## 2022-03-15 PROCEDURE — 85025 COMPLETE CBC W/AUTO DIFF WBC: CPT | Mod: PO | Performed by: INTERNAL MEDICINE

## 2022-03-15 PROCEDURE — 80048 BASIC METABOLIC PNL TOTAL CA: CPT | Mod: PO | Performed by: INTERNAL MEDICINE

## 2022-03-15 PROCEDURE — 36415 COLL VENOUS BLD VENIPUNCTURE: CPT | Mod: PO | Performed by: INTERNAL MEDICINE

## 2022-03-16 ENCOUNTER — ANESTHESIA EVENT (OUTPATIENT)
Dept: CARDIOLOGY | Facility: HOSPITAL | Age: 62
End: 2022-03-16
Payer: MEDICAID

## 2022-03-16 ENCOUNTER — HOSPITAL ENCOUNTER (OUTPATIENT)
Facility: HOSPITAL | Age: 62
Discharge: HOME | End: 2022-03-16
Attending: INTERNAL MEDICINE | Admitting: INTERNAL MEDICINE
Payer: MEDICAID

## 2022-03-16 ENCOUNTER — ANESTHESIA (OUTPATIENT)
Dept: CARDIOLOGY | Facility: HOSPITAL | Age: 62
End: 2022-03-16
Payer: MEDICAID

## 2022-03-16 VITALS
TEMPERATURE: 99 F | SYSTOLIC BLOOD PRESSURE: 149 MMHG | RESPIRATION RATE: 20 BRPM | HEART RATE: 93 BPM | WEIGHT: 224 LBS | OXYGEN SATURATION: 96 % | DIASTOLIC BLOOD PRESSURE: 89 MMHG | HEIGHT: 64 IN | BODY MASS INDEX: 38.24 KG/M2

## 2022-03-16 DIAGNOSIS — Z01.810 PRE-OPERATIVE CARDIOVASCULAR EXAMINATION: Primary | ICD-10-CM

## 2022-03-16 DIAGNOSIS — I48.0 PAROXYSMAL ATRIAL FIBRILLATION: ICD-10-CM

## 2022-03-16 DIAGNOSIS — I48.91 A-FIB: ICD-10-CM

## 2022-03-16 DIAGNOSIS — Z01.810 PREOP CARDIOVASCULAR EXAM: ICD-10-CM

## 2022-03-16 PROCEDURE — 93010 ELECTROCARDIOGRAM REPORT: CPT | Mod: 77,,, | Performed by: INTERNAL MEDICINE

## 2022-03-16 PROCEDURE — 93010 EKG 12-LEAD: ICD-10-PCS | Mod: 77,,, | Performed by: INTERNAL MEDICINE

## 2022-03-16 PROCEDURE — 92960 PR CARDIOVERSION, ELECTIVE;EXTERN: ICD-10-PCS | Mod: ,,, | Performed by: INTERNAL MEDICINE

## 2022-03-16 PROCEDURE — 37000009 HC ANESTHESIA EA ADD 15 MINS: Performed by: INTERNAL MEDICINE

## 2022-03-16 PROCEDURE — 93010 ELECTROCARDIOGRAM REPORT: CPT | Mod: ,,, | Performed by: INTERNAL MEDICINE

## 2022-03-16 PROCEDURE — 92960 CARDIOVERSION ELECTRIC EXT: CPT | Performed by: INTERNAL MEDICINE

## 2022-03-16 PROCEDURE — 93010 EKG 12-LEAD: ICD-10-PCS | Mod: ,,, | Performed by: INTERNAL MEDICINE

## 2022-03-16 PROCEDURE — 92960 CARDIOVERSION ELECTRIC EXT: CPT | Mod: ,,, | Performed by: INTERNAL MEDICINE

## 2022-03-16 PROCEDURE — 00410 ANES INTEG SYS CONV ARRHYT: CPT | Performed by: INTERNAL MEDICINE

## 2022-03-16 PROCEDURE — 93005 ELECTROCARDIOGRAM TRACING: CPT

## 2022-03-16 PROCEDURE — 37000008 HC ANESTHESIA 1ST 15 MINUTES: Performed by: INTERNAL MEDICINE

## 2022-03-16 PROCEDURE — 25000003 PHARM REV CODE 250: Performed by: NURSE ANESTHETIST, CERTIFIED REGISTERED

## 2022-03-16 PROCEDURE — 63600175 PHARM REV CODE 636 W HCPCS: Performed by: NURSE ANESTHETIST, CERTIFIED REGISTERED

## 2022-03-16 RX ORDER — PROPOFOL 10 MG/ML
VIAL (ML) INTRAVENOUS
Status: DISCONTINUED | OUTPATIENT
Start: 2022-03-16 | End: 2022-03-16

## 2022-03-16 RX ORDER — LIDOCAINE HCL/PF 100 MG/5ML
SYRINGE (ML) INTRAVENOUS
Status: DISCONTINUED | OUTPATIENT
Start: 2022-03-16 | End: 2022-03-16

## 2022-03-16 RX ADMIN — SODIUM CHLORIDE: 0.9 INJECTION, SOLUTION INTRAVENOUS at 07:03

## 2022-03-16 RX ADMIN — LIDOCAINE HYDROCHLORIDE 50 MG: 20 INJECTION, SOLUTION INTRAVENOUS at 08:03

## 2022-03-16 RX ADMIN — PROPOFOL 50 MG: 10 INJECTION, EMULSION INTRAVENOUS at 08:03

## 2022-03-16 NOTE — PLAN OF CARE
Pt s/p successful cardioversion - previous Afib now in sinus tachy. Dr. Benavidez at bedside, EKG completed. Pt AAOx4, sitting up watching tv in recovery at this time. Will continue to monitor through to discharge for safety and needs.

## 2022-03-16 NOTE — TRANSFER OF CARE
"Anesthesia Transfer of Care Note    Patient: Yeny Hargrove    Procedure(s) Performed: Procedure(s) (LRB):  Cardioversion or Defibrillation (N/A)    Patient location: Cath Lab    Anesthesia Type: MAC    Transport from OR: Transported from OR on 6-10 L/min O2 by face mask with adequate spontaneous ventilation    Post pain: adequate analgesia    Post assessment: no apparent anesthetic complications and tolerated procedure well    Post vital signs: stable    Level of consciousness: responds to stimulation and sedated    Nausea/Vomiting: no nausea/vomiting    Complications: none    Transfer of care protocol was followed      Last vitals:   Visit Vitals  BP (!) 144/91   Pulse (!) 150   Temp 37.1 °C (98.7 °F) (Temporal)   Resp 20   Ht 5' 4" (1.626 m)   Wt 101.6 kg (224 lb)   LMP  (LMP Unknown)   SpO2 96%   Breastfeeding No   BMI 38.45 kg/m²     "

## 2022-03-16 NOTE — INTERVAL H&P NOTE
Reviewed clinic note above. No changes since last evaluation. Compliant with medications. Discussed risks, benefits and alternatives. Agreeable to proceed with DCCV.

## 2022-03-16 NOTE — ANESTHESIA PREPROCEDURE EVALUATION
03/16/2022  Yeny Hargrove is a 62 y.o., female for cardioversion under MAC/GA    Past Medical History:   Diagnosis Date    Asthma     Breast cancer     Hypertension      Past Surgical History:   Procedure Laterality Date    BREAST LUMPECTOMY      HYSTERECTOMY      MASTECTOMY             Pre-op Assessment    I have reviewed the Patient Summary Reports.    I have reviewed the NPO Status.   I have reviewed the Medications.     Review of Systems  Social:  Non-Smoker    Endocrine:  Obesity / BMI > 30      Physical Exam  General: Well nourished    Airway:  Mallampati: I       Dental:  Edentulous    Heart:  Rate: Tachycardia  Rhythm: Irregularly Irregular    9/2021  · The left ventricle is normal in size with normal systolic function.  · The estimated ejection fraction is 63%.  · Indeterminate left ventricular diastolic function.  · Moderate left atrial enlargement.  · Normal right ventricular size with normal right ventricular systolic function.  · Mild right atrial enlargement.  · Normal central venous pressure (3 mmHg).  · The estimated PA systolic pressure is 31 mmHg.  · The ascending aorta is mildly dilated.  · Trivial posterior pericardial effusion.         Anesthesia Plan  Type of Anesthesia, risks & benefits discussed:    Anesthesia Type: MAC, Gen ETT  Intra-op Monitoring Plan: Standard ASA Monitors  Informed Consent: Informed consent signed with the Patient and all parties understand the risks and agree with anesthesia plan.  All questions answered.   ASA Score: 3    Ready For Surgery From Anesthesia Perspective.     .

## 2022-03-16 NOTE — ANESTHESIA POSTPROCEDURE EVALUATION
Anesthesia Post Evaluation    Patient: Yeny Hargrove    Procedure(s) Performed: Procedure(s) (LRB):  Cardioversion or Defibrillation (N/A)    Final Anesthesia Type: MAC      Patient location during evaluation: Cath Lab  Patient participation: Yes- Able to Participate  Level of consciousness: awake and alert  Post-procedure vital signs: reviewed and stable  Pain management: adequate  Airway patency: patent    PONV status at discharge: No PONV  Anesthetic complications: no      Cardiovascular status: hemodynamically stable  Respiratory status: unassisted, spontaneous ventilation and room air  Hydration status: euvolemic  Follow-up not needed.          Vitals Value Taken Time   /81 03/16/22 0802   Temp 37.1 °C (98.7 °F) 03/16/22 0727   Pulse 116 03/16/22 0807   Resp 25 03/16/22 0807   SpO2 98 % 03/16/22 0807   Vitals shown include unvalidated device data.      No case tracking events are documented in the log.      Pain/[place] Score: No data recorded

## 2022-03-16 NOTE — PLAN OF CARE
Pt arrived with family from home, ambulates with/out assistance. Patient lying in bed with no complaints of pain or distress noted. Monitors applied. VSS, AAOx4. NADN. Dr. Stephens at bedside for anesthesia consent. EKG in progress. Yellow non-skid socks placed on patient. Fall risk reviewed with patient.     MD consent pending, in chart at bedside. Procedure and recovery process explained to patient and all questions answered.  Patient verbalized understanding, denies questions. Will continue to monitor for safety and needs.

## 2022-03-16 NOTE — DISCHARGE INSTRUCTIONS
Discharge Instructions:    Wear surgical mask.    You may have bruising to chest or back.  Take tylenol as needed for soreness.  Any chest pain, shortness of breath call 911 go to ER.    Do not drive a car, operate heavy equipment, care for a young child, etc for the next 12-24 hours.   Avoid drinking alcohol for 24 hours.  Do not make any important decisions for 24 hours.  Drink fluids to keep hydrated. Resume your usual diet as tolerated.   Rest for today then activity as tolerated.   Do not lift anything over 5 pounds for the first 3 days after procedure.    Call MD for any unrelieved pain, excessive nausea or vomiting, redness around site, bleeding, or pus or foul smelling drainage, or any other questions or concerns.  Go to the ER for any difficulty breathing or chest pain.    Follow any additional instructions given to you by MD. Call MD to schedule a follow up appointment, or follow up as instructed.

## 2022-03-16 NOTE — BRIEF OP NOTE
Procedure: DCCV    Pre-procedure rhythm: Afib 130s-150s  Sp synchronized cardioversion 200J  Post-procedure rhythm: STac 110s    Updated cousin Mickie in the waiting room

## 2022-03-16 NOTE — DISCHARGE SUMMARY
[facility] - Lab (Hospital)  Discharge Note  Short Stay    Procedure(s) (LRB):  Cardioversion or Defibrillation (N/A)    OUTCOME: Patient tolerated treatment/procedure well without complication and is now ready for discharge.    DISPOSITION: Home or Self Care    FINAL DIAGNOSIS:  <principal problem not specified>    FOLLOWUP: In clinic    DISCHARGE INSTRUCTIONS:    Discharge Procedure Orders   CBC W/ AUTO DIFFERENTIAL   Standing Status: Future Number of Occurrences: 1 Standing Exp. Date: 05/10/23     BASIC METABOLIC PANEL   Standing Status: Future Number of Occurrences: 1 Standing Exp. Date: 05/10/23

## 2022-04-01 ENCOUNTER — CLINICAL SUPPORT (OUTPATIENT)
Dept: CARDIOLOGY | Facility: HOSPITAL | Age: 62
End: 2022-04-01
Attending: INTERNAL MEDICINE
Payer: MEDICAID

## 2022-04-01 ENCOUNTER — OFFICE VISIT (OUTPATIENT)
Dept: CARDIOLOGY | Facility: CLINIC | Age: 62
End: 2022-04-01
Payer: MEDICAID

## 2022-04-01 VITALS
BODY MASS INDEX: 40.15 KG/M2 | OXYGEN SATURATION: 96 % | HEART RATE: 84 BPM | SYSTOLIC BLOOD PRESSURE: 144 MMHG | WEIGHT: 235.19 LBS | HEIGHT: 64 IN | DIASTOLIC BLOOD PRESSURE: 84 MMHG

## 2022-04-01 DIAGNOSIS — I15.2 HYPERTENSION ASSOCIATED WITH DIABETES: ICD-10-CM

## 2022-04-01 DIAGNOSIS — I48.0 PAROXYSMAL ATRIAL FIBRILLATION: Primary | ICD-10-CM

## 2022-04-01 DIAGNOSIS — E11.59 HYPERTENSION ASSOCIATED WITH DIABETES: ICD-10-CM

## 2022-04-01 DIAGNOSIS — I48.0 PAROXYSMAL ATRIAL FIBRILLATION: ICD-10-CM

## 2022-04-01 DIAGNOSIS — I77.819 AORTIC DILATATION: ICD-10-CM

## 2022-04-01 PROCEDURE — 3077F SYST BP >= 140 MM HG: CPT | Mod: CPTII,S$GLB,, | Performed by: INTERNAL MEDICINE

## 2022-04-01 PROCEDURE — 3079F DIAST BP 80-89 MM HG: CPT | Mod: CPTII,S$GLB,, | Performed by: INTERNAL MEDICINE

## 2022-04-01 PROCEDURE — 99213 PR OFFICE/OUTPT VISIT, EST, LEVL III, 20-29 MIN: ICD-10-PCS | Mod: S$GLB,,, | Performed by: INTERNAL MEDICINE

## 2022-04-01 PROCEDURE — 3079F PR MOST RECENT DIASTOLIC BLOOD PRESSURE 80-89 MM HG: ICD-10-PCS | Mod: CPTII,S$GLB,, | Performed by: INTERNAL MEDICINE

## 2022-04-01 PROCEDURE — 3008F PR BODY MASS INDEX (BMI) DOCUMENTED: ICD-10-PCS | Mod: CPTII,S$GLB,, | Performed by: INTERNAL MEDICINE

## 2022-04-01 PROCEDURE — 3077F PR MOST RECENT SYSTOLIC BLOOD PRESSURE >= 140 MM HG: ICD-10-PCS | Mod: CPTII,S$GLB,, | Performed by: INTERNAL MEDICINE

## 2022-04-01 PROCEDURE — 1160F PR REVIEW ALL MEDS BY PRESCRIBER/CLIN PHARMACIST DOCUMENTED: ICD-10-PCS | Mod: CPTII,S$GLB,, | Performed by: INTERNAL MEDICINE

## 2022-04-01 PROCEDURE — 3051F HG A1C>EQUAL 7.0%<8.0%: CPT | Mod: CPTII,S$GLB,, | Performed by: INTERNAL MEDICINE

## 2022-04-01 PROCEDURE — 1160F RVW MEDS BY RX/DR IN RCRD: CPT | Mod: CPTII,S$GLB,, | Performed by: INTERNAL MEDICINE

## 2022-04-01 PROCEDURE — 1159F MED LIST DOCD IN RCRD: CPT | Mod: CPTII,S$GLB,, | Performed by: INTERNAL MEDICINE

## 2022-04-01 PROCEDURE — 1159F PR MEDICATION LIST DOCUMENTED IN MEDICAL RECORD: ICD-10-PCS | Mod: CPTII,S$GLB,, | Performed by: INTERNAL MEDICINE

## 2022-04-01 PROCEDURE — 99213 OFFICE O/P EST LOW 20 MIN: CPT | Mod: S$GLB,,, | Performed by: INTERNAL MEDICINE

## 2022-04-01 PROCEDURE — 3008F BODY MASS INDEX DOCD: CPT | Mod: CPTII,S$GLB,, | Performed by: INTERNAL MEDICINE

## 2022-04-01 PROCEDURE — 3051F PR MOST RECENT HEMOGLOBIN A1C LEVEL 7.0 - < 8.0%: ICD-10-PCS | Mod: CPTII,S$GLB,, | Performed by: INTERNAL MEDICINE

## 2022-04-01 RX ORDER — CARVEDILOL 6.25 MG/1
6.25 TABLET ORAL 2 TIMES DAILY WITH MEALS
Qty: 180 TABLET | Refills: 3 | Status: SHIPPED | OUTPATIENT
Start: 2022-04-01 | End: 2023-05-26 | Stop reason: SDUPTHER

## 2022-04-01 RX ORDER — METOPROLOL SUCCINATE 25 MG/1
25 TABLET, EXTENDED RELEASE ORAL DAILY
COMMUNITY
Start: 2022-03-16 | End: 2022-04-01

## 2022-04-01 NOTE — PROGRESS NOTES
Vitals:    04/01/22 1005   BP: (!) 144/84   Pulse: 84     - Reports doing well  - Some SOB- negative stress test. Will do monitor to do detect if paroxysmal episodes of afib  - EKG 4/1/22: SR 80s, normal axis. LVH.  Possible septal infarct. QTc 463  - Dilated aorta- echo ordered for Sept 2022  - Compliant with rivaroxaban  - Increase carvedilol to 6.25 mg daily

## 2022-05-23 DIAGNOSIS — R06.2 WHEEZE: Primary | ICD-10-CM

## 2022-06-08 NOTE — PROGRESS NOTES
Please call patient re monitor with normal rhythm of heart. Can follow in clinic if continues to have shortness of breath

## 2022-06-15 ENCOUNTER — PATIENT MESSAGE (OUTPATIENT)
Dept: CARDIOLOGY | Facility: CLINIC | Age: 62
End: 2022-06-15
Payer: MEDICAID

## 2022-08-29 ENCOUNTER — TELEPHONE (OUTPATIENT)
Dept: CARDIOLOGY | Facility: CLINIC | Age: 62
End: 2022-08-29
Payer: MEDICAID

## 2022-08-29 NOTE — TELEPHONE ENCOUNTER
Called pt back in regards to this message.    Scheduled follow up with Dr. Benavidez on Friday 10-07-22    Mailed appt reminder to the pt    ND

## 2022-08-29 NOTE — TELEPHONE ENCOUNTER
----- Message from Kerri Rodriguez Patient Care Assistant sent at 8/29/2022  3:32 PM CDT -----  Type:  Sooner Apoointment Request    Caller is requesting a sooner appointment.  Caller declined first available appointment listed below.  Caller will not accept being placed on the waitlist and is requesting a message be sent to doctor.  Name of Caller: pt  When is the first available appointment? 11/11  Symptoms: follow up   Would the patient rather a call back or a [facility]?  call  Best Call Back Number: [phone]  Additional Information:

## 2022-09-01 ENCOUNTER — HOSPITAL ENCOUNTER (OUTPATIENT)
Dept: CARDIOLOGY | Facility: HOSPITAL | Age: 62
Discharge: HOME | End: 2022-09-01
Attending: INTERNAL MEDICINE
Payer: MEDICAID

## 2022-09-01 VITALS — WEIGHT: 235 LBS | HEIGHT: 64 IN | BODY MASS INDEX: 40.12 KG/M2

## 2022-09-01 DIAGNOSIS — I77.819 AORTIC DILATATION: ICD-10-CM

## 2022-09-01 PROCEDURE — 93306 TTE W/DOPPLER COMPLETE: CPT | Mod: PO

## 2022-09-01 PROCEDURE — 93306 ECHO (CUPID ONLY): ICD-10-PCS | Mod: 26,,, | Performed by: INTERNAL MEDICINE

## 2022-09-01 PROCEDURE — 93306 TTE W/DOPPLER COMPLETE: CPT | Mod: 26,,, | Performed by: INTERNAL MEDICINE

## 2022-09-05 LAB
AORTIC ROOT ANNULUS: 3.1 CM
ASCENDING AORTA: 4 CM
AV INDEX (PROSTH): 0.77
AV MEAN GRADIENT: 6 MMHG
AV PEAK GRADIENT: 10 MMHG
AV VALVE AREA: 2.42 CM2
AV VELOCITY RATIO: 0.74
BSA FOR ECHO PROCEDURE: 2.19 M2
CV ECHO LV RWT: 0.3 CM
DOP CALC AO PEAK VEL: 1.6 M/S
DOP CALC AO VTI: 36.8 CM
DOP CALC LVOT AREA: 3.1 CM2
DOP CALC LVOT DIAMETER: 2 CM
DOP CALC LVOT PEAK VEL: 1.19 M/S
DOP CALC LVOT STROKE VOLUME: 88.99 CM3
DOP CALC MV VTI: 19.33 CM
DOP CALCLVOT PEAK VEL VTI: 28.34 CM
E WAVE DECELERATION TIME: 141.68 MSEC
E/A RATIO: 0.77
E/E' RATIO: 9.33 M/S
ECHO LV POSTERIOR WALL: 0.74 CM (ref 0.6–1.1)
EJECTION FRACTION: 55 %
FRACTIONAL SHORTENING: 45 % (ref 28–44)
INTERVENTRICULAR SEPTUM: 0.9 CM (ref 0.6–1.1)
LA MAJOR: 6 CM
LA MINOR: 4.8 CM
LA WIDTH: 2.5 CM
LEFT ATRIUM SIZE: 4 CM
LEFT ATRIUM VOLUME INDEX MOD: 14.1 ML/M2
LEFT ATRIUM VOLUME INDEX: 21.7 ML/M2
LEFT ATRIUM VOLUME MOD: 29.48 CM3
LEFT ATRIUM VOLUME: 45.33 CM3
LEFT INTERNAL DIMENSION IN SYSTOLE: 2.7 CM (ref 2.1–4)
LEFT VENTRICLE DIASTOLIC VOLUME INDEX: 55.15 ML/M2
LEFT VENTRICLE DIASTOLIC VOLUME: 115.27 ML
LEFT VENTRICLE MASS INDEX: 66 G/M2
LEFT VENTRICLE SYSTOLIC VOLUME INDEX: 23.1 ML/M2
LEFT VENTRICLE SYSTOLIC VOLUME: 48.27 ML
LEFT VENTRICULAR INTERNAL DIMENSION IN DIASTOLE: 4.95 CM (ref 3.5–6)
LEFT VENTRICULAR MASS: 137.8 G
LV LATERAL E/E' RATIO: 7.78 M/S
LV SEPTAL E/E' RATIO: 11.67 M/S
MV MEAN GRADIENT: 1 MMHG
MV PEAK A VEL: 0.91 M/S
MV PEAK E VEL: 0.7 M/S
MV PEAK GRADIENT: 5 MMHG
MV STENOSIS PRESSURE HALF TIME: 41.09 MS
MV VALVE AREA BY CONTINUITY EQUATION: 4.6 CM2
MV VALVE AREA P 1/2 METHOD: 5.35 CM2
PISA MRMAX VEL: 0.03 M/S
PULM VEIN S/D RATIO: 1.55
PV PEAK D VEL: 0.29 M/S
PV PEAK S VEL: 0.45 M/S
PV PEAK VELOCITY: 0.98 CM/S
RA MAJOR: 3.3 CM
RA PRESSURE: 3 MMHG
RA WIDTH: 2.3 CM
RIGHT VENTRICULAR END-DIASTOLIC DIMENSION: 2.4 CM
RIGHT VENTRICULAR LENGTH IN DIASTOLE (APICAL 4-CHAMBER VIEW): 4.7 CM
SINUS: 3.1 CM
TDI LATERAL: 0.09 M/S
TDI SEPTAL: 0.06 M/S
TDI: 0.08 M/S
TRICUSPID ANNULAR PLANE SYSTOLIC EXCURSION: 2.08 CM

## 2022-09-14 NOTE — PROGRESS NOTES
Please call patient re echocardiogram with normal pumping function of heart. Noted dilated aorta, similar to prior echo. Recommend repeat in 1 year

## 2022-09-16 ENCOUNTER — PATIENT MESSAGE (OUTPATIENT)
Dept: CARDIOLOGY | Facility: CLINIC | Age: 62
End: 2022-09-16
Payer: MEDICAID

## 2022-10-07 ENCOUNTER — OFFICE VISIT (OUTPATIENT)
Dept: CARDIOLOGY | Facility: CLINIC | Age: 62
End: 2022-10-07
Payer: MEDICAID

## 2022-10-07 VITALS
HEIGHT: 64 IN | BODY MASS INDEX: 40.8 KG/M2 | WEIGHT: 239 LBS | HEART RATE: 85 BPM | OXYGEN SATURATION: 96 % | DIASTOLIC BLOOD PRESSURE: 87 MMHG | SYSTOLIC BLOOD PRESSURE: 137 MMHG

## 2022-10-07 DIAGNOSIS — E11.9 DIABETES MELLITUS WITHOUT COMPLICATION: ICD-10-CM

## 2022-10-07 DIAGNOSIS — I48.91 ATRIAL FIBRILLATION, UNSPECIFIED TYPE: Primary | ICD-10-CM

## 2022-10-07 DIAGNOSIS — E78.5 HYPERLIPIDEMIA ASSOCIATED WITH TYPE 2 DIABETES MELLITUS: ICD-10-CM

## 2022-10-07 DIAGNOSIS — I15.2 HYPERTENSION ASSOCIATED WITH DIABETES: ICD-10-CM

## 2022-10-07 DIAGNOSIS — I77.819 AORTIC DILATATION: ICD-10-CM

## 2022-10-07 DIAGNOSIS — E11.69 HYPERLIPIDEMIA ASSOCIATED WITH TYPE 2 DIABETES MELLITUS: ICD-10-CM

## 2022-10-07 DIAGNOSIS — R60.9 SWELLING: ICD-10-CM

## 2022-10-07 DIAGNOSIS — E11.59 HYPERTENSION ASSOCIATED WITH DIABETES: ICD-10-CM

## 2022-10-07 PROCEDURE — 3008F BODY MASS INDEX DOCD: CPT | Mod: CPTII,S$GLB,, | Performed by: INTERNAL MEDICINE

## 2022-10-07 PROCEDURE — 1160F PR REVIEW ALL MEDS BY PRESCRIBER/CLIN PHARMACIST DOCUMENTED: ICD-10-PCS | Mod: CPTII,S$GLB,, | Performed by: INTERNAL MEDICINE

## 2022-10-07 PROCEDURE — 3008F PR BODY MASS INDEX (BMI) DOCUMENTED: ICD-10-PCS | Mod: CPTII,S$GLB,, | Performed by: INTERNAL MEDICINE

## 2022-10-07 PROCEDURE — 3075F PR MOST RECENT SYSTOLIC BLOOD PRESS GE 130-139MM HG: ICD-10-PCS | Mod: CPTII,S$GLB,, | Performed by: INTERNAL MEDICINE

## 2022-10-07 PROCEDURE — 1159F PR MEDICATION LIST DOCUMENTED IN MEDICAL RECORD: ICD-10-PCS | Mod: CPTII,S$GLB,, | Performed by: INTERNAL MEDICINE

## 2022-10-07 PROCEDURE — 3075F SYST BP GE 130 - 139MM HG: CPT | Mod: CPTII,S$GLB,, | Performed by: INTERNAL MEDICINE

## 2022-10-07 PROCEDURE — 99214 PR OFFICE/OUTPT VISIT, EST, LEVL IV, 30-39 MIN: ICD-10-PCS | Mod: S$GLB,,, | Performed by: INTERNAL MEDICINE

## 2022-10-07 PROCEDURE — 99214 OFFICE O/P EST MOD 30 MIN: CPT | Mod: S$GLB,,, | Performed by: INTERNAL MEDICINE

## 2022-10-07 PROCEDURE — 3079F PR MOST RECENT DIASTOLIC BLOOD PRESSURE 80-89 MM HG: ICD-10-PCS | Mod: CPTII,S$GLB,, | Performed by: INTERNAL MEDICINE

## 2022-10-07 PROCEDURE — 3079F DIAST BP 80-89 MM HG: CPT | Mod: CPTII,S$GLB,, | Performed by: INTERNAL MEDICINE

## 2022-10-07 PROCEDURE — 1159F MED LIST DOCD IN RCRD: CPT | Mod: CPTII,S$GLB,, | Performed by: INTERNAL MEDICINE

## 2022-10-07 PROCEDURE — 3051F HG A1C>EQUAL 7.0%<8.0%: CPT | Mod: CPTII,S$GLB,, | Performed by: INTERNAL MEDICINE

## 2022-10-07 PROCEDURE — 1160F RVW MEDS BY RX/DR IN RCRD: CPT | Mod: CPTII,S$GLB,, | Performed by: INTERNAL MEDICINE

## 2022-10-07 PROCEDURE — 3051F PR MOST RECENT HEMOGLOBIN A1C LEVEL 7.0 - < 8.0%: ICD-10-PCS | Mod: CPTII,S$GLB,, | Performed by: INTERNAL MEDICINE

## 2022-10-07 RX ORDER — TORSEMIDE 20 MG/1
20 TABLET ORAL DAILY
Qty: 30 TABLET | Refills: 11 | Status: SHIPPED | OUTPATIENT
Start: 2022-10-07 | End: 2023-10-07

## 2022-10-07 NOTE — PROGRESS NOTES
Cardiology Clinic note    Subjective:   Patient ID:  Yeny Hargrove is a 62 y.o. female who presents for irregular heart beat evaluation    HPI:   SOB, chest discomfort: Reports resolved. However does endorse orthopnea, leg swelling. Takes furosemide- states not working for her.    AFIb: No palpitations, syncope. On AC.    HTN: On medications    HLD: Tolerating statin    SH Tobacco Never used  FH No premature CAD    There are no problems to display for this patient.  PMH  DM  HTN  HLD    Patient's Medications   New Prescriptions    TORSEMIDE (DEMADEX) 20 MG TAB    Take 1 tablet (20 mg total) by mouth once daily.   Previous Medications    ACETAMINOPHEN-CODEINE 300-15MG (TYLENOL #2) 300-15 MG PER TABLET    Take 1 tablet by mouth every 4 (four) hours as needed for Pain.    ALBUTEROL (PROVENTIL/VENTOLIN HFA) 90 MCG/ACTUATION INHALER    Ventolin HFA 90 mcg/actuation aerosol inhaler   Inhale 2 puffs 4 times a day by inhalation route.    ALBUTEROL (PROVENTIL/VENTOLIN HFA) 90 MCG/ACTUATION INHALER    albuterol sulfate HFA 90 mcg/actuation aerosol inhaler    ALLERGY RELIEF, LORATADINE, 10 MG TABLET    Take 10 mg by mouth once daily.    AMITRIPTYLINE (ELAVIL) 25 MG TABLET    Take 25 mg by mouth nightly as needed for Insomnia.    AMLODIPINE (NORVASC) 10 MG TABLET        AMOXICILLIN (AMOXIL) 875 MG TABLET    Take 875 mg by mouth 2 (two) times daily.    ANASTROZOLE (ARIMIDEX) 1 MG TAB    Take 1 mg by mouth once daily.    ATORVASTATIN (LIPITOR) 80 MG TABLET    Take 1 tablet (80 mg total) by mouth once daily.    BACLOFEN (LIORESAL) 5 MG TAB TABLET    baclofen 5 mg tablet    BENZONATATE (TESSALON) 100 MG CAPSULE    Tessalon Perles 100 mg capsule   Take 1 capsule every day by oral route for 30 days.    CARVEDILOL (COREG) 6.25 MG TABLET    Take 1 tablet (6.25 mg total) by mouth 2 (two) times daily with meals.    DICLOFENAC (VOLTAREN) 50 MG EC TABLET    Take 50 mg by mouth 2 (two) times daily.    FERROUS SULFATE (FEOSOL) 325 MG (65 MG  "IRON) TAB TABLET    FeroSul 325 mg (65 mg iron) tablet    FLUTICASONE PROPIONATE (FLONASE) 50 MCG/ACTUATION NASAL SPRAY    fluticasone propionate 50 mcg/actuation nasal spray,suspension    FLUTICASONE PROPIONATE (FLOVENT HFA) 220 MCG/ACTUATION INHALER    Flovent  mcg/actuation aerosol inhaler   Inhale 2 puffs twice a day by inhalation route for 30 days.    HYDROCHLOROTHIAZIDE (HYDRODIURIL) 25 MG TABLET    hydrochlorothiazide 25 mg tablet   Take 1 tablet every day by oral route for 30 days.    LINACLOTIDE (LINZESS) 145 MCG CAP CAPSULE    Linzess 145 mcg capsule   Take 1 capsule every day by oral route in the morning for 90 days.    METFORMIN (GLUCOPHAGE) 500 MG TABLET    Take 500 mg by mouth 2 (two) times daily.    OMEPRAZOLE (PRILOSEC) 40 MG CAPSULE    Take 40 mg by mouth once daily.    PROMETHAZINE-CODEINE 6.25-10 MG/5 ML (PHENERGAN WITH CODEINE) 6.25-10 MG/5 ML SYRUP    5 mLs.    RIVAROXABAN (XARELTO) 20 MG TAB    Take 1 tablet (20 mg total) by mouth once daily.    STIOLTO RESPIMAT 2.5-2.5 MCG/ACTUATION MIST    SMARTSI Puff(s) Via Inhaler Daily   Modified Medications    No medications on file   Discontinued Medications    FUROSEMIDE (LASIX) 20 MG TABLET    furosemide 20 mg tablet        Review of Systems   Constitutional: Negative for fever.   HENT:  Negative for nosebleeds.    Cardiovascular:  Negative for chest pain, dyspnea on exertion, near-syncope, palpitations and syncope.        As above   Respiratory:  Negative for hemoptysis.    Hematologic/Lymphatic: Negative for bleeding problem.   Skin:  Negative for poor wound healing.   Gastrointestinal:  Negative for hematochezia.   Genitourinary:  Negative for hematuria.   Neurological:  Negative for light-headedness.   Allergic/Immunologic: Negative for persistent infections.       Objective:   Vitals  Vitals:    10/07/22 1027   BP: 137/87   Pulse: 85   SpO2: 96%   Weight: 108.4 kg (239 lb)   Height: 5' 4" (1.626 m)          Physical " "Exam  Constitutional:       General: She is not in acute distress.     Appearance: She is well-developed. She is not diaphoretic.   HENT:      Head: Normocephalic.   Neck:      Vascular: No JVD.   Cardiovascular:      Rate and Rhythm: Normal rate and regular rhythm.      Heart sounds: No murmur heard.    No friction rub. No gallop.   Pulmonary:      Effort: Pulmonary effort is normal. No respiratory distress.      Breath sounds: Normal breath sounds.   Abdominal:      Palpations: Abdomen is soft.      Tenderness: There is no abdominal tenderness.   Musculoskeletal:         General: Swelling present.      Cervical back: Normal range of motion.   Skin:     General: Skin is warm.   Neurological:      Mental Status: She is alert.   Psychiatric:         Mood and Affect: Mood normal.           Assessment:     1. Atrial fibrillation, unspecified type    2. Swelling    3. Aortic dilatation    4. Diabetes mellitus without complication    5. Hypertension associated with diabetes    6. Hyperlipidemia associated with type 2 diabetes mellitus        Plan:   Yeny Hargrove is a 62 y.o. female h/o DM, HTN, HLD  Asthma  Breast cancer    Used to follow with Dr Sharp (used to be in [place]- now moved [place]). She wanted to stay local and made this appointment.    Reports had a stress test some years back which she reports she had a scar attributed to her prior radiation treatment (h/o breast cancer 2007 XRT, sp mastectomy 2018)    EKG personally reviewed. My interpretation:  3/16/22: Stac 100s, normal axis. PRWP. Qtc 482  2/18/22: Afib, normal axis. HR 110s. Septal infarct. Non-sp ST-T abn. QTc 473  8/6/21: SR 80s, normal axis. PAC noted. Otherwise normal EKG. QTc 445    AFib  - CHADS2-VASc 3 (HTN, DM, female)  - Rivaroxaban  - TAYLA/DCCV discussed - she would like to defer for now. FUP 2 weeks with EKG. Reconsider TAYLA/DCCV  - BB  - March 2022: "Cardioversion was successfully performed with restoration of normal sinus rhythm."  - " "Event monitor May 2022: "The baseline rhythm on this 30 day event monitor shows sinus rhythm at 98 bpm. No other strips are available for review."    SOB, chest discomfort  - Echo 2017: LVEF 60-65%, DD1. Normal RVSF. PASP 43. Mild MR, TR  - Echo 2021: LVEF 63%, DD indeterminate. Normal RVSF. Mod LAE, mild AGUSTÍN. PASP 31, CVP 3. Asc aorta mildly dilated. Trivial pericardial effusion  - PET stress test 2021: No ischemia. CT attenuation images demonstrate mild diffuse coronary calcifications in the LAD and RCA territory.  - Resolved    Orthopnea, swelling  - Change to torsemide- take as needed  - 1 week FUP  - Echo CVP 3  - Venous insufficiency US    Ascending aorta mildly dilated  - Echo Sept 2022  The left ventricle is normal in size with normal systolic function.  Normal left ventricular diastolic function.  The estimated ejection fraction is 55%.  Normal right ventricular size with normal right ventricular systolic function.  Normal central venous pressure (3 mmHg).  The aortic root is mildly dilated. (4 cm)  - Repeat 1 year with FUP    DM  Lab Results   Component Value Date    HGBA1C 7.0 (H) 03/15/2022   Managed by PCP    HTN  BP well controlled  Amlodipine, carvedilol, HCTZ  Salt restriction    HLD  Lab Results   Component Value Date    LDLCALC 80.0 03/15/2022   Statin as above      Continue with current medical plan and lifestyle changes.    Orders Placed This Encounter   Procedures    CV US Lower Extremity Veins Bilateral Insufficiency     Standing Status:   Future     Standing Expiration Date:   10/7/2023     Order Specific Question:   Release to patient     Answer:   Immediate       Follow up as scheduled  Return sooner for concerns or questions. If symptoms persist go to the ED    She expressed verbal understanding and agreed with the plan      Shanika Benavidez MD  Interventional Cardiology  [facility]  Phone: [phone]    "

## 2022-10-14 ENCOUNTER — OFFICE VISIT (OUTPATIENT)
Dept: CARDIOLOGY | Facility: CLINIC | Age: 62
End: 2022-10-14
Payer: MEDICAID

## 2022-10-14 ENCOUNTER — HOSPITAL ENCOUNTER (OUTPATIENT)
Dept: RADIOLOGY | Facility: HOSPITAL | Age: 62
Discharge: HOME | End: 2022-10-14
Attending: INTERNAL MEDICINE
Payer: MEDICAID

## 2022-10-14 VITALS
DIASTOLIC BLOOD PRESSURE: 84 MMHG | HEART RATE: 77 BPM | BODY MASS INDEX: 41.54 KG/M2 | SYSTOLIC BLOOD PRESSURE: 120 MMHG | OXYGEN SATURATION: 96 % | WEIGHT: 242 LBS

## 2022-10-14 DIAGNOSIS — E11.9 DIABETES MELLITUS WITHOUT COMPLICATION: ICD-10-CM

## 2022-10-14 DIAGNOSIS — R60.9 SWELLING: ICD-10-CM

## 2022-10-14 DIAGNOSIS — I77.819 AORTIC DILATATION: ICD-10-CM

## 2022-10-14 DIAGNOSIS — I15.2 HYPERTENSION ASSOCIATED WITH DIABETES: ICD-10-CM

## 2022-10-14 DIAGNOSIS — E78.5 HYPERLIPIDEMIA ASSOCIATED WITH TYPE 2 DIABETES MELLITUS: ICD-10-CM

## 2022-10-14 DIAGNOSIS — E11.59 HYPERTENSION ASSOCIATED WITH DIABETES: ICD-10-CM

## 2022-10-14 DIAGNOSIS — E11.69 HYPERLIPIDEMIA ASSOCIATED WITH TYPE 2 DIABETES MELLITUS: ICD-10-CM

## 2022-10-14 DIAGNOSIS — I48.91 ATRIAL FIBRILLATION, UNSPECIFIED TYPE: Primary | ICD-10-CM

## 2022-10-14 PROCEDURE — 3079F PR MOST RECENT DIASTOLIC BLOOD PRESSURE 80-89 MM HG: ICD-10-PCS | Mod: CPTII,S$GLB,, | Performed by: INTERNAL MEDICINE

## 2022-10-14 PROCEDURE — 3051F HG A1C>EQUAL 7.0%<8.0%: CPT | Mod: CPTII,S$GLB,, | Performed by: INTERNAL MEDICINE

## 2022-10-14 PROCEDURE — 93970 EXTREMITY STUDY: CPT | Mod: TC,PO

## 2022-10-14 PROCEDURE — 3079F DIAST BP 80-89 MM HG: CPT | Mod: CPTII,S$GLB,, | Performed by: INTERNAL MEDICINE

## 2022-10-14 PROCEDURE — 99214 PR OFFICE/OUTPT VISIT, EST, LEVL IV, 30-39 MIN: ICD-10-PCS | Mod: S$GLB,,, | Performed by: INTERNAL MEDICINE

## 2022-10-14 PROCEDURE — 1159F MED LIST DOCD IN RCRD: CPT | Mod: CPTII,S$GLB,, | Performed by: INTERNAL MEDICINE

## 2022-10-14 PROCEDURE — 3074F SYST BP LT 130 MM HG: CPT | Mod: CPTII,S$GLB,, | Performed by: INTERNAL MEDICINE

## 2022-10-14 PROCEDURE — 1159F PR MEDICATION LIST DOCUMENTED IN MEDICAL RECORD: ICD-10-PCS | Mod: CPTII,S$GLB,, | Performed by: INTERNAL MEDICINE

## 2022-10-14 PROCEDURE — 3074F PR MOST RECENT SYSTOLIC BLOOD PRESSURE < 130 MM HG: ICD-10-PCS | Mod: CPTII,S$GLB,, | Performed by: INTERNAL MEDICINE

## 2022-10-14 PROCEDURE — 99214 OFFICE O/P EST MOD 30 MIN: CPT | Mod: S$GLB,,, | Performed by: INTERNAL MEDICINE

## 2022-10-14 PROCEDURE — 1160F PR REVIEW ALL MEDS BY PRESCRIBER/CLIN PHARMACIST DOCUMENTED: ICD-10-PCS | Mod: CPTII,S$GLB,, | Performed by: INTERNAL MEDICINE

## 2022-10-14 PROCEDURE — 1160F RVW MEDS BY RX/DR IN RCRD: CPT | Mod: CPTII,S$GLB,, | Performed by: INTERNAL MEDICINE

## 2022-10-14 PROCEDURE — 3051F PR MOST RECENT HEMOGLOBIN A1C LEVEL 7.0 - < 8.0%: ICD-10-PCS | Mod: CPTII,S$GLB,, | Performed by: INTERNAL MEDICINE

## 2022-10-14 NOTE — PROGRESS NOTES
Please call Ms Hargrove re US veins of the legs with no blood clots. Noted reflux in the right leg (not left). Recommend monitor how she does clinically. Leg elevation and compression stockings can help.

## 2022-10-14 NOTE — PROGRESS NOTES
Cardiology Clinic note    Subjective:   Patient ID:  Yeny Hargrove is a 62 y.o. female who presents for swelling FUP    HPI:   SOB, chest discomfort: Resolved. Orthopnea resolved. Leg swelling significantly improved. Good UOP with torsemide - taking every other day.    AFIb: No palpitations, syncope. On AC.    HTN: On medications    HLD: Tolerating statin    SH Tobacco Never used  FH No premature CAD    There are no problems to display for this patient.  PMH  DM  HTN  HLD    Patient's Medications   New Prescriptions    No medications on file   Previous Medications    ACETAMINOPHEN-CODEINE 300-15MG (TYLENOL #2) 300-15 MG PER TABLET    Take 1 tablet by mouth every 4 (four) hours as needed for Pain.    ALBUTEROL (PROVENTIL/VENTOLIN HFA) 90 MCG/ACTUATION INHALER    Ventolin HFA 90 mcg/actuation aerosol inhaler   Inhale 2 puffs 4 times a day by inhalation route.    ALBUTEROL (PROVENTIL/VENTOLIN HFA) 90 MCG/ACTUATION INHALER    albuterol sulfate HFA 90 mcg/actuation aerosol inhaler    ALLERGY RELIEF, LORATADINE, 10 MG TABLET    Take 10 mg by mouth once daily.    AMITRIPTYLINE (ELAVIL) 25 MG TABLET    Take 25 mg by mouth nightly as needed for Insomnia.    AMLODIPINE (NORVASC) 10 MG TABLET        AMOXICILLIN (AMOXIL) 875 MG TABLET    Take 875 mg by mouth 2 (two) times daily.    ANASTROZOLE (ARIMIDEX) 1 MG TAB    Take 1 mg by mouth once daily.    ATORVASTATIN (LIPITOR) 80 MG TABLET    Take 1 tablet (80 mg total) by mouth once daily.    BACLOFEN (LIORESAL) 5 MG TAB TABLET    baclofen 5 mg tablet    BENZONATATE (TESSALON) 100 MG CAPSULE    Tessalon Perles 100 mg capsule   Take 1 capsule every day by oral route for 30 days.    CARVEDILOL (COREG) 6.25 MG TABLET    Take 1 tablet (6.25 mg total) by mouth 2 (two) times daily with meals.    DICLOFENAC (VOLTAREN) 50 MG EC TABLET    Take 50 mg by mouth 2 (two) times daily.    FERROUS SULFATE (FEOSOL) 325 MG (65 MG IRON) TAB TABLET    FeroSul 325 mg (65 mg iron) tablet    FLUTICASONE  PROPIONATE (FLONASE) 50 MCG/ACTUATION NASAL SPRAY    fluticasone propionate 50 mcg/actuation nasal spray,suspension    FLUTICASONE PROPIONATE (FLOVENT HFA) 220 MCG/ACTUATION INHALER    Flovent  mcg/actuation aerosol inhaler   Inhale 2 puffs twice a day by inhalation route for 30 days.    HYDROCHLOROTHIAZIDE (HYDRODIURIL) 25 MG TABLET    hydrochlorothiazide 25 mg tablet   Take 1 tablet every day by oral route for 30 days.    LINACLOTIDE (LINZESS) 145 MCG CAP CAPSULE    Linzess 145 mcg capsule   Take 1 capsule every day by oral route in the morning for 90 days.    METFORMIN (GLUCOPHAGE) 500 MG TABLET    Take 500 mg by mouth 2 (two) times daily.    OMEPRAZOLE (PRILOSEC) 40 MG CAPSULE    Take 40 mg by mouth once daily.    PROMETHAZINE-CODEINE 6.25-10 MG/5 ML (PHENERGAN WITH CODEINE) 6.25-10 MG/5 ML SYRUP    5 mLs.    RIVAROXABAN (XARELTO) 20 MG TAB    Take 1 tablet (20 mg total) by mouth once daily.    STIOLTO RESPIMAT 2.5-2.5 MCG/ACTUATION MIST    SMARTSI Puff(s) Via Inhaler Daily    TORSEMIDE (DEMADEX) 20 MG TAB    Take 1 tablet (20 mg total) by mouth once daily.   Modified Medications    No medications on file   Discontinued Medications    No medications on file        Review of Systems   Constitutional: Negative for fever.   HENT:  Negative for nosebleeds.    Cardiovascular:  Negative for chest pain, dyspnea on exertion, irregular heartbeat, near-syncope, orthopnea, palpitations, paroxysmal nocturnal dyspnea and syncope.        As above   Respiratory:  Negative for hemoptysis.    Hematologic/Lymphatic: Negative for bleeding problem.   Skin:  Negative for poor wound healing.   Gastrointestinal:  Negative for hematochezia.   Genitourinary:  Negative for hematuria.   Neurological:  Negative for light-headedness.   Allergic/Immunologic: Negative for persistent infections.       Objective:   Vitals  Vitals:    10/14/22 1003   BP: 120/84   Pulse: 77   SpO2: 96%   Weight: 109.8 kg (242 lb)          Physical  "Exam  Constitutional:       General: She is not in acute distress.     Appearance: She is well-developed. She is not diaphoretic.   HENT:      Head: Normocephalic.   Neck:      Vascular: No JVD.   Cardiovascular:      Rate and Rhythm: Normal rate and regular rhythm.      Heart sounds: No murmur heard.    No friction rub. No gallop.   Pulmonary:      Effort: Pulmonary effort is normal. No respiratory distress.      Breath sounds: Normal breath sounds.   Abdominal:      Palpations: Abdomen is soft.      Tenderness: There is no abdominal tenderness.   Musculoskeletal:         General: Swelling present.      Cervical back: Normal range of motion.   Skin:     General: Skin is warm.   Neurological:      Mental Status: She is alert.   Psychiatric:         Mood and Affect: Mood normal.           Assessment:     1. Atrial fibrillation, unspecified type    2. Swelling    3. Aortic dilatation    4. Diabetes mellitus without complication    5. Hypertension associated with diabetes    6. Hyperlipidemia associated with type 2 diabetes mellitus        Plan:   Yeny Hargrove is a 62 y.o. female h/o DM, HTN, HLD  Asthma  Breast cancer    Used to follow with Dr Sharp ([facility] to be in [facility] now moved [place]). She wanted to stay local.    Reports had a stress test some years back which she reports she had a scar attributed to her prior radiation treatment (h/o breast cancer 2007 XRT, sp mastectomy 2018)    EKG personally reviewed. My interpretation:  3/16/22: Stac 100s, normal axis. PRWP. Qtc 482  2/18/22: Afib, normal axis. HR 110s. Septal infarct. Non-sp ST-T abn. QTc 473  8/6/21: SR 80s, normal axis. PAC noted. Otherwise normal EKG. QTc 445    AFib  - CHADS2-VASc 3 (HTN, DM, female)  - Rivaroxaban  - BB  - March 2022: "Cardioversion was successfully performed with restoration of normal sinus rhythm."  - Event monitor May 2022: "The baseline rhythm on this 30 day event monitor shows sinus rhythm at 98 bpm. No other strips are " "available for review."    SOB, chest discomfort  - Echo 2017: LVEF 60-65%, DD1. Normal RVSF. PASP 43. Mild MR, TR  - Echo 2021: LVEF 63%, DD indeterminate. Normal RVSF. Mod LAE, mild AGUSTÍN. PASP 31, CVP 3. Asc aorta mildly dilated. Trivial pericardial effusion  - PET stress test 2021: No ischemia. CT attenuation images demonstrate mild diffuse coronary calcifications in the LAD and RCA territory.  - Echo September 2022  The left ventricle is normal in size with normal systolic function.   Normal left ventricular diastolic function.   The estimated ejection fraction is 55%.   Normal right ventricular size with normal right ventricular systolic function.   Normal central venous pressure (3 mmHg).   The aortic root is mildly dilated. (Asc aorta 4 cm)  - Resolved    Orthopnea, swelling  Orthopnea resolved. Swelling significantly improved but not completely resolved  - Torsemide- asked to take as needed (not regularly). Discussed symptoms to watch for and call. Discussed coming to ED for emergent situations.  - Echo CVP 3  - Venous insufficiency US pending    Ascending aorta mildly dilated  - Echo Sept 2022  The left ventricle is normal in size with normal systolic function.  Normal left ventricular diastolic function.  The estimated ejection fraction is 55%.  Normal right ventricular size with normal right ventricular systolic function.  Normal central venous pressure (3 mmHg).  The aortic root is mildly dilated. (4 cm)  - Repeat 1 year with FUP    DM  Lab Results   Component Value Date    HGBA1C 7.0 (H) 03/15/2022   Managed by PCP    HTN  BP well controlled  Amlodipine, carvedilol, HCTZ  Salt restriction    HLD  Lab Results   Component Value Date    LDLCALC 80.0 03/15/2022   Statin as above      Continue with current medical plan and lifestyle changes.    Orders Placed This Encounter   Procedures    US Lower Extremity Veins Bilateral Insuf     Standing Status:   Future     Standing Expiration Date:   10/14/2023     Order " Specific Question:   May the Radiologist modify the order per protocol to meet the clinical needs of the patient?     Answer:   Yes         Follow up as scheduled  Return sooner for concerns or questions. If symptoms persist go to the ED    She expressed verbal understanding and agreed with the plan      Shanika Benavidez MD  Interventional Cardiology  [facility]  Phone: [phone]

## 2022-11-04 ENCOUNTER — PATIENT MESSAGE (OUTPATIENT)
Dept: CARDIOLOGY | Facility: CLINIC | Age: 62
End: 2022-11-04
Payer: MEDICAID

## 2022-12-01 ENCOUNTER — OUTSIDE PLACE OF SERVICE (OUTPATIENT)
Dept: CARDIOLOGY | Facility: CLINIC | Age: 62
End: 2022-12-01
Payer: MEDICAID

## 2022-12-01 PROCEDURE — 93010 PR ELECTROCARDIOGRAM REPORT: ICD-10-PCS | Mod: ,,, | Performed by: INTERNAL MEDICINE

## 2022-12-01 PROCEDURE — 93010 ELECTROCARDIOGRAM REPORT: CPT | Mod: ,,, | Performed by: INTERNAL MEDICINE

## 2022-12-06 ENCOUNTER — HOSPITAL ENCOUNTER (EMERGENCY)
Facility: HOSPITAL | Age: 62
Discharge: HOME | End: 2022-12-06
Attending: EMERGENCY MEDICINE
Payer: MEDICAID

## 2022-12-06 VITALS
WEIGHT: 242 LBS | RESPIRATION RATE: 23 BRPM | DIASTOLIC BLOOD PRESSURE: 92 MMHG | TEMPERATURE: 98 F | SYSTOLIC BLOOD PRESSURE: 138 MMHG | BODY MASS INDEX: 41.54 KG/M2 | OXYGEN SATURATION: 96 % | HEART RATE: 89 BPM

## 2022-12-06 DIAGNOSIS — I48.91 ATRIAL FIBRILLATION WITH RAPID VENTRICULAR RESPONSE: Primary | ICD-10-CM

## 2022-12-06 DIAGNOSIS — R07.9 CHEST PAIN: ICD-10-CM

## 2022-12-06 LAB
ALBUMIN SERPL BCP-MCNC: 4 G/DL (ref 3.5–5.2)
ALP SERPL-CCNC: 109 U/L (ref 55–135)
ALT SERPL W/O P-5'-P-CCNC: 16 U/L (ref 10–44)
ANION GAP SERPL CALC-SCNC: 11 MMOL/L (ref 8–16)
AST SERPL-CCNC: 18 U/L (ref 10–40)
BASOPHILS # BLD AUTO: 0.07 K/UL (ref 0–0.2)
BASOPHILS NFR BLD: 0.8 % (ref 0–1.9)
BILIRUB SERPL-MCNC: 0.4 MG/DL (ref 0.1–1)
BNP SERPL-MCNC: 214 PG/ML (ref 0–99)
BUN SERPL-MCNC: 9 MG/DL (ref 8–23)
CALCIUM SERPL-MCNC: 9.3 MG/DL (ref 8.7–10.5)
CHLORIDE SERPL-SCNC: 104 MMOL/L (ref 95–110)
CO2 SERPL-SCNC: 24 MMOL/L (ref 23–29)
CREAT SERPL-MCNC: 0.9 MG/DL (ref 0.5–1.4)
DIFFERENTIAL METHOD: ABNORMAL
EOSINOPHIL # BLD AUTO: 0.1 K/UL (ref 0–0.5)
EOSINOPHIL NFR BLD: 0.8 % (ref 0–8)
ERYTHROCYTE [DISTWIDTH] IN BLOOD BY AUTOMATED COUNT: 17.8 % (ref 11.5–14.5)
EST. GFR  (NO RACE VARIABLE): >60 ML/MIN/1.73 M^2
GLUCOSE SERPL-MCNC: 120 MG/DL (ref 70–110)
HCT VFR BLD AUTO: 40.6 % (ref 37–48.5)
HGB BLD-MCNC: 12.8 G/DL (ref 12–16)
IMM GRANULOCYTES # BLD AUTO: 0.02 K/UL (ref 0–0.04)
IMM GRANULOCYTES NFR BLD AUTO: 0.2 % (ref 0–0.5)
LYMPHOCYTES # BLD AUTO: 4 K/UL (ref 1–4.8)
LYMPHOCYTES NFR BLD: 45.9 % (ref 18–48)
MAGNESIUM SERPL-MCNC: 2 MG/DL (ref 1.6–2.6)
MCH RBC QN AUTO: 26.8 PG (ref 27–31)
MCHC RBC AUTO-ENTMCNC: 31.5 G/DL (ref 32–36)
MCV RBC AUTO: 85 FL (ref 82–98)
MONOCYTES # BLD AUTO: 0.9 K/UL (ref 0.3–1)
MONOCYTES NFR BLD: 10.1 % (ref 4–15)
NEUTROPHILS # BLD AUTO: 3.7 K/UL (ref 1.8–7.7)
NEUTROPHILS NFR BLD: 42.2 % (ref 38–73)
NRBC BLD-RTO: 0 /100 WBC
PLATELET # BLD AUTO: 252 K/UL (ref 150–450)
PMV BLD AUTO: 10 FL (ref 9.2–12.9)
POTASSIUM SERPL-SCNC: 5 MMOL/L (ref 3.5–5.1)
PROT SERPL-MCNC: 7.7 G/DL (ref 6–8.4)
RBC # BLD AUTO: 4.78 M/UL (ref 4–5.4)
SODIUM SERPL-SCNC: 139 MMOL/L (ref 136–145)
TROPONIN I SERPL DL<=0.01 NG/ML-MCNC: <0.006 NG/ML (ref 0–0.03)
TROPONIN I SERPL DL<=0.01 NG/ML-MCNC: <0.006 NG/ML (ref 0–0.03)
WBC # BLD AUTO: 8.78 K/UL (ref 3.9–12.7)

## 2022-12-06 PROCEDURE — 85025 COMPLETE CBC W/AUTO DIFF WBC: CPT | Performed by: PHYSICIAN ASSISTANT

## 2022-12-06 PROCEDURE — 93010 ELECTROCARDIOGRAM REPORT: CPT | Mod: ,,, | Performed by: INTERNAL MEDICINE

## 2022-12-06 PROCEDURE — 93010 EKG 12-LEAD: ICD-10-PCS | Mod: ,,, | Performed by: INTERNAL MEDICINE

## 2022-12-06 PROCEDURE — 93005 ELECTROCARDIOGRAM TRACING: CPT

## 2022-12-06 PROCEDURE — 83735 ASSAY OF MAGNESIUM: CPT | Performed by: PHYSICIAN ASSISTANT

## 2022-12-06 PROCEDURE — 99285 EMERGENCY DEPT VISIT HI MDM: CPT | Mod: 25

## 2022-12-06 PROCEDURE — 96374 THER/PROPH/DIAG INJ IV PUSH: CPT

## 2022-12-06 PROCEDURE — 96361 HYDRATE IV INFUSION ADD-ON: CPT

## 2022-12-06 PROCEDURE — 80053 COMPREHEN METABOLIC PANEL: CPT | Performed by: PHYSICIAN ASSISTANT

## 2022-12-06 PROCEDURE — 84484 ASSAY OF TROPONIN QUANT: CPT | Performed by: PHYSICIAN ASSISTANT

## 2022-12-06 PROCEDURE — 83880 ASSAY OF NATRIURETIC PEPTIDE: CPT | Performed by: PHYSICIAN ASSISTANT

## 2022-12-06 PROCEDURE — 25000003 PHARM REV CODE 250: Performed by: EMERGENCY MEDICINE

## 2022-12-06 RX ORDER — METOPROLOL TARTRATE 1 MG/ML
5 INJECTION, SOLUTION INTRAVENOUS EVERY 5 MIN PRN
Status: DISCONTINUED | OUTPATIENT
Start: 2022-12-06 | End: 2022-12-07 | Stop reason: HOSPADM

## 2022-12-06 RX ORDER — METOPROLOL TARTRATE 50 MG/1
50 TABLET ORAL
Status: COMPLETED | OUTPATIENT
Start: 2022-12-06 | End: 2022-12-06

## 2022-12-06 RX ORDER — METOPROLOL TARTRATE 25 MG/1
25 TABLET, FILM COATED ORAL 2 TIMES DAILY
Qty: 60 TABLET | Refills: 0 | Status: SHIPPED | OUTPATIENT
Start: 2022-12-06 | End: 2023-05-26

## 2022-12-06 RX ADMIN — METOPROLOL TARTRATE 5 MG: 5 INJECTION, SOLUTION INTRAVENOUS at 09:12

## 2022-12-06 RX ADMIN — METOPROLOL TARTRATE 50 MG: 50 TABLET, FILM COATED ORAL at 09:12

## 2022-12-06 RX ADMIN — SODIUM CHLORIDE 500 ML: 0.9 INJECTION, SOLUTION INTRAVENOUS at 09:12

## 2022-12-07 ENCOUNTER — TELEPHONE (OUTPATIENT)
Dept: CARDIOLOGY | Facility: CLINIC | Age: 62
End: 2022-12-07
Payer: MEDICAID

## 2022-12-07 NOTE — FIRST PROVIDER EVALUATION
Emergency Department TeleTriage Encounter Note      CHIEF COMPLAINT    Chief Complaint   Patient presents with    Chest Pain     Left anterior CP that radiates towards left thurs, pt referred to ED, pt complains of right sided HA,        VITAL SIGNS   Initial Vitals [12/06/22 1734]   BP Pulse Resp Temp SpO2   (!) 168/93 (!) 112 20 98.3 °F (36.8 °C) 98 %      MAP       --            ALLERGIES    Review of patient's allergies indicates:   Allergen Reactions    Iodine and iodide containing products Swelling    Shellfish containing products Swelling    Iodinated contrast media Swelling    Pcn [penicillins] Swelling       PROVIDER TRIAGE NOTE  This is a teletriage evaluation of a 62 y.o. female presenting to the ED with c/o recommended eval by outside care team for abnormal EKG (afib? Though h/o). Patient denies CP. On oral AC.     PE:. Non-toxic/well-appearing. No respiratory distress, speaks in full sentences without issue. No active emesis nor cough. Normal eye contact and mentation.     Plan: labs, EKG, cxr. Further/augmented workup at discretion of examining provider.     All ED beds are full at present; patient notified of this status.  Patient seen and medically screened by JAKE via teletriage. Orders initiated at triage to expedite care.  Patient is stable and will be placed in an ED bed when available.  Care will be transferred to an alternate provider when patient has been placed in an Exam Room further exam, additional orders, and disposition.         ORDERS  Labs Reviewed - No data to display    ED Orders (720h ago, onward)      None              Virtual Visit Note: The provider triage portion of this emergency department evaluation and documentation was performed via [employer], a HIPAA-compliant telemedicine application, in concert with a tele-presenter in the room. A face to face patient evaluation with one of my colleagues will occur once the patient is placed in an emergency department  room.      DISCLAIMER: This note was prepared with [employer] voice recognition transcription software. Garbled syntax, mangled pronouns, and other bizarre constructions may be attributed to that software system.

## 2022-12-07 NOTE — DISCHARGE INSTRUCTIONS
Please continue medication compliance as discussed, please continue metoprolol, follow-up with Cardiology and primary care return the ER for any concerning reason.

## 2022-12-07 NOTE — ED NOTES
Pt given d/c instructions and prescriptions with correct return verbalization of understanding. Pt denies any chest pain and dyspnea. Normal respiratory drive noted. Pt is alert, oriented x4 and in no distress prior to departing ER. Normal respiratory drive noted.

## 2022-12-07 NOTE — TELEPHONE ENCOUNTER
Called pt back in regards to this message.    Scheduled pt for appt on Monday 01-09-23 with Dr. Benavidez at [facility]     No further questions  ND

## 2022-12-07 NOTE — TELEPHONE ENCOUNTER
----- Message from Desi John sent at 12/7/2022  1:55 PM CST -----  Needs advice from nurse:      Who Called:pt  Regarding:patient would like to be seen next Friday for an appt  Would the patient rather a call back or [facility]?  Best Call Back number:[phone]  Additional Info:

## 2022-12-07 NOTE — ED PROVIDER NOTES
Encounter Date: 12/6/2022    SCRIBE #1 NOTE: I, Tobias Turner Jr, am scribing for, and in the presence of,  Michael Kendall MD. I have scribed the following portions of the note - Other sections scribed: HPI, ROS, PE, MDM.     History     Chief Complaint   Patient presents with    Chest Pain     Left anterior CP that radiates towards left thurs, pt referred to ED, pt complains of right sided MORALEZ,      Yeny Hargrove is a 62-year-old female who has a past medical history of asthma, breast cancer, and hypertension.The patient presents to the emergency department for evaluation of chest pain; onset two days. Associated symptoms include palpitations and shortness of breath.The patient developed intermittent, left anterior chest pain accompanied by palpitations; chest pain stated to occasionally radiate throughout the chest. Patient took medication for symptoms, no relief. In the emergency department, she mentioned having a prior medical history of atrial fibrillation, but denies medication use for this condition; heart rate is currently 146 bpm.In addition to the chief complaint, she mentioned experiencing a mild episode of shortness of breath; patient's oxygen saturation is 94% on room air. Patient denies chills and fever. Patient is allergic to iodine and iodide containing products, iodinated contrast media, penicillins, and shellfish containing products.         Review of patient's allergies indicates:   Allergen Reactions    Iodine and iodide containing products Swelling    Shellfish containing products Swelling    Iodinated contrast media Swelling    Pcn [penicillins] Swelling     Past Medical History:   Diagnosis Date    Asthma     Breast cancer     Hypertension      Past Surgical History:   Procedure Laterality Date    BREAST LUMPECTOMY      HYSTERECTOMY      MASTECTOMY      TREATMENT OF CARDIAC ARRHYTHMIA N/A 3/16/2022    Procedure: Cardioversion or Defibrillation;  Surgeon: Shanika Benavidez MD;  Location: [facility]  CATH LAB/EP;  Service: Cardiology;  Laterality: N/A;     Family History   Problem Relation Age of Onset    Eclampsia Neg Hx     Diabetes Neg Hx     Colon cancer Neg Hx     Cancer Neg Hx     Breast cancer Neg Hx     Hypertension Neg Hx     Miscarriages / Stillbirths Neg Hx     Ovarian cancer Neg Hx      labor Neg Hx     Stroke Neg Hx      Social History     Tobacco Use    Smoking status: Never    Smokeless tobacco: Never   Substance Use Topics    Alcohol use: No    Drug use: No     Review of Systems   Constitutional:  Negative for chills and fever.   Respiratory:  Positive for shortness of breath.    Cardiovascular:  Positive for chest pain and palpitations.   All other systems reviewed and are negative.    Physical Exam     Initial Vitals [22 1734]   BP Pulse Resp Temp SpO2   (!) 168/93 (!) 112 20 98.3 °F (36.8 °C) 98 %      MAP       --         Physical Exam    Nursing note and vitals reviewed.  Constitutional: She appears well-developed and well-nourished.   HENT:   Head: Normocephalic and atraumatic.   Eyes: Conjunctivae and EOM are normal.   Neck: Neck supple.   Normal range of motion.  Cardiovascular:            Irregularly irregular heart rate, consistent with atrial fibrillation.   Pulmonary/Chest: Breath sounds normal.   Abdominal: Abdomen is soft. There is no abdominal tenderness.   Musculoskeletal:         General: Normal range of motion.      Cervical back: Normal range of motion and neck supple.     Neurological: She is alert and oriented to person, place, and time.   Skin: Skin is warm and dry. Capillary refill takes less than 2 seconds. No rash noted.   Psychiatric: She has a normal mood and affect. Thought content normal.       ED Course   Procedures  Labs Reviewed   CBC W/ AUTO DIFFERENTIAL - Abnormal; Notable for the following components:       Result Value    MCH 26.8 (*)     MCHC 31.5 (*)     RDW 17.8 (*)     All other components within normal limits   COMPREHENSIVE METABOLIC PANEL -  Abnormal; Notable for the following components:    Glucose 120 (*)     All other components within normal limits   B-TYPE NATRIURETIC PEPTIDE - Abnormal; Notable for the following components:     (*)     All other components within normal limits   TROPONIN I   TROPONIN I   MAGNESIUM          Imaging Results              X-Ray Chest AP Portable (Final result)  Result time 12/06/22 19:05:16      Final result by Sesar Lovelace MD (12/06/22 19:05:16)                   Impression:      1. Pulmonary findings may reflect edema, correlation is advised in this hypoventilatory exam.      Electronically signed by: Sesar Lovelace MD  Date:    12/06/2022  Time:    19:05               Narrative:    EXAMINATION:  XR CHEST AP PORTABLE    CLINICAL HISTORY:  Chest Pain;    TECHNIQUE:  Single frontal view of the chest was performed.    COMPARISON:  None    FINDINGS:  The cardiomediastinal silhouette is prominent, magnified by technique noting surgical change and calcification of the aorta..  There is no pleural effusion.  The trachea is midline.  The lungs are symmetrically expanded bilaterally with prominent interstitial attenuation.  No large focal consolidation seen.  There is no pneumothorax.  The osseous structures are remarkable for degenerative changes..                                       Medications   sodium chloride 0.9% bolus 500 mL (0 mLs Intravenous Stopped 12/6/22 2220)   metoprolol tartrate (LOPRESSOR) tablet 50 mg (50 mg Oral Given 12/6/22 2147)     Medical Decision Making:   History:   Old Medical Records: I decided to obtain old medical records.  Initial Assessment:   Pleasant 62-year-old female presents the ER for evaluation of chest pain.  Found to be AFib RVR heart rate as high as 26089.  It appears symptoms started greater than 48 hours ago.  She is on Xarelto.  Will plan chemical rate control and reassess possibly needs a drip and admission.    Clinical Tests:   Lab Tests: Ordered and  Reviewed  Radiological Study: Ordered and Reviewed  Medical Tests: Ordered and Reviewed        Scribe Attestation:   Scribe #1: I performed the above scribed service and the documentation accurately describes the services I performed. I attest to the accuracy of the note.      ED Course as of 12/07/22 0350   Tue Dec 06, 2022   2150 Heart rate improving after metoprolol, IV p.o. given will continue to observe. [SE]   2325 Resting in bed no acute distress heart rate appears improved with IV p.o. metoprolol.  Blood work reassuring patient feeling much better.  She is on Xarelto.  Discussed with patient's cardiologist patient okay for discharge home continue metoprolol will follow-up outpatient setting for likely cardioversion.  Return precautions discussed patient which she understood agreed with patient will be discharged. [SE]      ED Course User Index  [SE] Michael Kendall MD                   My Scribe Attestation: I acknowledge that the documentation on this chart was provided by described on the date of service noted above and that the documentation in the chart accurately reflects work and decisions made by me alone.   Clinical Impression:   Final diagnoses:  [R07.9] Chest pain  [I48.91] Atrial fibrillation with rapid ventricular response (Primary)        ED Disposition Condition    Discharge Stable          ED Prescriptions       Medication Sig Dispense Start Date End Date Auth. Provider    metoprolol tartrate (LOPRESSOR) 25 MG tablet Take 1 tablet (25 mg total) by mouth 2 (two) times daily. 60 tablet 12/6/2022 1/5/2023 Michael Kendall MD          Follow-up Information       Follow up With Specialties Details Why Contact Info    Gail Strickland MD Internal Medicine Schedule an appointment as soon as possible for a visit  As needed [address]  [address]  [facility]ARE  [facility] [address]  [phone]      Shanika Benavidez MD Interventional Cardiology Schedule an appointment as soon as  possible for a visit  As needed [address]  [address]  [facility] [address]  [phone]               Michael Kendall MD  12/07/22 0358

## 2022-12-08 RX ORDER — ATORVASTATIN CALCIUM 80 MG/1
80 TABLET, FILM COATED ORAL DAILY
Qty: 30 TABLET | Refills: 11 | Status: SHIPPED | OUTPATIENT
Start: 2022-12-08

## 2023-01-09 ENCOUNTER — OFFICE VISIT (OUTPATIENT)
Dept: CARDIOLOGY | Facility: CLINIC | Age: 63
End: 2023-01-09
Payer: MEDICAID

## 2023-01-09 VITALS
DIASTOLIC BLOOD PRESSURE: 95 MMHG | HEIGHT: 64 IN | SYSTOLIC BLOOD PRESSURE: 139 MMHG | BODY MASS INDEX: 40.34 KG/M2 | HEART RATE: 84 BPM | WEIGHT: 236.31 LBS | OXYGEN SATURATION: 98 %

## 2023-01-09 DIAGNOSIS — E11.9 DIABETES MELLITUS WITHOUT COMPLICATION: ICD-10-CM

## 2023-01-09 DIAGNOSIS — E78.5 HYPERLIPIDEMIA ASSOCIATED WITH TYPE 2 DIABETES MELLITUS: ICD-10-CM

## 2023-01-09 DIAGNOSIS — I48.91 ATRIAL FIBRILLATION, UNSPECIFIED TYPE: Primary | ICD-10-CM

## 2023-01-09 DIAGNOSIS — I77.819 AORTIC DILATATION: ICD-10-CM

## 2023-01-09 DIAGNOSIS — E11.59 HYPERTENSION ASSOCIATED WITH DIABETES: ICD-10-CM

## 2023-01-09 DIAGNOSIS — I15.2 HYPERTENSION ASSOCIATED WITH DIABETES: ICD-10-CM

## 2023-01-09 DIAGNOSIS — E11.69 HYPERLIPIDEMIA ASSOCIATED WITH TYPE 2 DIABETES MELLITUS: ICD-10-CM

## 2023-01-09 PROCEDURE — 99999 PR PBB SHADOW E&M-EST. PATIENT-LVL II: CPT | Mod: PBBFAC,,, | Performed by: INTERNAL MEDICINE

## 2023-01-09 PROCEDURE — 93010 EKG 12-LEAD: ICD-10-PCS | Mod: S$PBB,,, | Performed by: INTERNAL MEDICINE

## 2023-01-09 PROCEDURE — 3008F BODY MASS INDEX DOCD: CPT | Mod: CPTII,,, | Performed by: INTERNAL MEDICINE

## 2023-01-09 PROCEDURE — 1160F RVW MEDS BY RX/DR IN RCRD: CPT | Mod: CPTII,,, | Performed by: INTERNAL MEDICINE

## 2023-01-09 PROCEDURE — 99212 OFFICE O/P EST SF 10 MIN: CPT | Mod: PBBFAC,PO | Performed by: INTERNAL MEDICINE

## 2023-01-09 PROCEDURE — 99214 PR OFFICE/OUTPT VISIT, EST, LEVL IV, 30-39 MIN: ICD-10-PCS | Mod: S$PBB,25,, | Performed by: INTERNAL MEDICINE

## 2023-01-09 PROCEDURE — 3075F PR MOST RECENT SYSTOLIC BLOOD PRESS GE 130-139MM HG: ICD-10-PCS | Mod: CPTII,,, | Performed by: INTERNAL MEDICINE

## 2023-01-09 PROCEDURE — 99999 PR PBB SHADOW E&M-EST. PATIENT-LVL II: ICD-10-PCS | Mod: PBBFAC,,, | Performed by: INTERNAL MEDICINE

## 2023-01-09 PROCEDURE — 93005 ELECTROCARDIOGRAM TRACING: CPT | Mod: PBBFAC,PO | Performed by: INTERNAL MEDICINE

## 2023-01-09 PROCEDURE — 3075F SYST BP GE 130 - 139MM HG: CPT | Mod: CPTII,,, | Performed by: INTERNAL MEDICINE

## 2023-01-09 PROCEDURE — 1159F PR MEDICATION LIST DOCUMENTED IN MEDICAL RECORD: ICD-10-PCS | Mod: CPTII,,, | Performed by: INTERNAL MEDICINE

## 2023-01-09 PROCEDURE — 1159F MED LIST DOCD IN RCRD: CPT | Mod: CPTII,,, | Performed by: INTERNAL MEDICINE

## 2023-01-09 PROCEDURE — 93010 ELECTROCARDIOGRAM REPORT: CPT | Mod: S$PBB,,, | Performed by: INTERNAL MEDICINE

## 2023-01-09 PROCEDURE — 3080F DIAST BP >= 90 MM HG: CPT | Mod: CPTII,,, | Performed by: INTERNAL MEDICINE

## 2023-01-09 PROCEDURE — 99214 OFFICE O/P EST MOD 30 MIN: CPT | Mod: S$PBB,25,, | Performed by: INTERNAL MEDICINE

## 2023-01-09 PROCEDURE — 3008F PR BODY MASS INDEX (BMI) DOCUMENTED: ICD-10-PCS | Mod: CPTII,,, | Performed by: INTERNAL MEDICINE

## 2023-01-09 PROCEDURE — 3080F PR MOST RECENT DIASTOLIC BLOOD PRESSURE >= 90 MM HG: ICD-10-PCS | Mod: CPTII,,, | Performed by: INTERNAL MEDICINE

## 2023-01-09 PROCEDURE — 1160F PR REVIEW ALL MEDS BY PRESCRIBER/CLIN PHARMACIST DOCUMENTED: ICD-10-PCS | Mod: CPTII,,, | Performed by: INTERNAL MEDICINE

## 2023-01-09 NOTE — PROGRESS NOTES
Cardiology Clinic note    Subjective:   Patient ID:  Yeny Hargrove is a 62 y.o. female who presents for AFib FUP    HPI:   SOB, chest discomfort: Resolved. Orthopnea resolved. Leg swelling resolved.    AFIb: ED Dec 2022 for Afib with RVR. Reports episode after having a chilled cold drink. No recurrent episodes of palpitations. No syncope. On AC.    HTN: On medications    HLD: Tolerating statin    SH Tobacco Never used  FH No premature CAD    There are no problems to display for this patient.    PMH  DM  HTN  HLD    Patient's Medications   New Prescriptions    No medications on file   Previous Medications    ACETAMINOPHEN-CODEINE 300-15MG (TYLENOL #2) 300-15 MG PER TABLET    Take 1 tablet by mouth every 4 (four) hours as needed for Pain.    ALBUTEROL (PROVENTIL/VENTOLIN HFA) 90 MCG/ACTUATION INHALER    Ventolin HFA 90 mcg/actuation aerosol inhaler   Inhale 2 puffs 4 times a day by inhalation route.    ALBUTEROL (PROVENTIL/VENTOLIN HFA) 90 MCG/ACTUATION INHALER    albuterol sulfate HFA 90 mcg/actuation aerosol inhaler    ALLERGY RELIEF, LORATADINE, 10 MG TABLET    Take 10 mg by mouth once daily.    AMITRIPTYLINE (ELAVIL) 25 MG TABLET    Take 25 mg by mouth nightly as needed for Insomnia.    AMLODIPINE (NORVASC) 10 MG TABLET        AMOXICILLIN (AMOXIL) 875 MG TABLET    Take 875 mg by mouth 2 (two) times daily.    ANASTROZOLE (ARIMIDEX) 1 MG TAB    Take 1 mg by mouth once daily.    ATORVASTATIN (LIPITOR) 80 MG TABLET    Take 1 tablet (80 mg total) by mouth once daily.    BACLOFEN (LIORESAL) 5 MG TAB TABLET    baclofen 5 mg tablet    BENZONATATE (TESSALON) 100 MG CAPSULE    Tessalon Perles 100 mg capsule   Take 1 capsule every day by oral route for 30 days.    CARVEDILOL (COREG) 6.25 MG TABLET    Take 1 tablet (6.25 mg total) by mouth 2 (two) times daily with meals.    DICLOFENAC (VOLTAREN) 50 MG EC TABLET    Take 50 mg by mouth 2 (two) times daily.    FERROUS SULFATE (FEOSOL) 325 MG (65 MG IRON) TAB TABLET    FeroSul  325 mg (65 mg iron) tablet    FLUTICASONE PROPIONATE (FLONASE) 50 MCG/ACTUATION NASAL SPRAY    fluticasone propionate 50 mcg/actuation nasal spray,suspension    FLUTICASONE PROPIONATE (FLOVENT HFA) 220 MCG/ACTUATION INHALER    Flovent  mcg/actuation aerosol inhaler   Inhale 2 puffs twice a day by inhalation route for 30 days.    HYDROCHLOROTHIAZIDE (HYDRODIURIL) 25 MG TABLET    hydrochlorothiazide 25 mg tablet   Take 1 tablet every day by oral route for 30 days.    LINACLOTIDE (LINZESS) 145 MCG CAP CAPSULE    Linzess 145 mcg capsule   Take 1 capsule every day by oral route in the morning for 90 days.    METFORMIN (GLUCOPHAGE) 500 MG TABLET    Take 500 mg by mouth 2 (two) times daily.    METOPROLOL TARTRATE (LOPRESSOR) 25 MG TABLET    Take 1 tablet (25 mg total) by mouth 2 (two) times daily.    OMEPRAZOLE (PRILOSEC) 40 MG CAPSULE    Take 40 mg by mouth once daily.    PROMETHAZINE-CODEINE 6.25-10 MG/5 ML (PHENERGAN WITH CODEINE) 6.25-10 MG/5 ML SYRUP    5 mLs.    RIVAROXABAN (XARELTO) 20 MG TAB    Take 1 tablet (20 mg total) by mouth once daily.    STIOLTO RESPIMAT 2.5-2.5 MCG/ACTUATION MIST    SMARTSI Puff(s) Via Inhaler Daily    TORSEMIDE (DEMADEX) 20 MG TAB    Take 1 tablet (20 mg total) by mouth once daily.   Modified Medications    No medications on file   Discontinued Medications    No medications on file        Review of Systems   Constitutional: Negative for fever.   HENT:  Negative for nosebleeds.    Cardiovascular:  Negative for chest pain, dyspnea on exertion, irregular heartbeat, leg swelling, near-syncope, orthopnea, palpitations, paroxysmal nocturnal dyspnea and syncope.        As above   Respiratory:  Negative for hemoptysis.    Hematologic/Lymphatic: Negative for bleeding problem.   Skin:  Negative for poor wound healing.   Gastrointestinal:  Negative for hematochezia.   Genitourinary:  Negative for hematuria.   Neurological:  Negative for light-headedness.   Allergic/Immunologic: Negative  "for persistent infections.       Objective:   Vitals  Vitals:    23 1405   BP: (!) 139/95   Pulse: 84   SpO2: 98%   Weight: 107.2 kg (236 lb 5.3 oz)   Height: 5' 4" (1.626 m)     Right Arm BP - Sittin/95    Physical Exam  Constitutional:       General: She is not in acute distress.     Appearance: She is well-developed. She is not diaphoretic.   HENT:      Head: Normocephalic.   Neck:      Vascular: No JVD.   Cardiovascular:      Rate and Rhythm: Normal rate and regular rhythm.      Heart sounds: No murmur heard.    No friction rub. No gallop.   Pulmonary:      Effort: Pulmonary effort is normal. No respiratory distress.      Breath sounds: Normal breath sounds.   Abdominal:      Palpations: Abdomen is soft.      Tenderness: There is no abdominal tenderness.   Musculoskeletal:         General: No swelling.      Cervical back: Normal range of motion.   Skin:     General: Skin is warm.   Neurological:      Mental Status: She is alert.   Psychiatric:         Mood and Affect: Mood normal.           Assessment:     1. Atrial fibrillation, unspecified type    2. Aortic dilatation    3. Diabetes mellitus without complication    4. Hypertension associated with diabetes    5. Hyperlipidemia associated with type 2 diabetes mellitus        Plan:   Yeny Hargrove is a 62 y.o. female h/o Afib, DM, HTN, HLD  Asthma  Breast cancer    - Used to follow with Dr Sharp (used to be in [place]- now moved [place]). She wanted to stay local.    - Reports had a stress test some years back which she reports she had a scar attributed to her prior radiation treatment (h/o breast cancer 2007 XRT, sp mastectomy )    - ED Dec 2022 for CP, palpitations. Noted Afib with RVR (known h/o Afib). Improved with metoprolol. Dc'd for clinic FUP.    EKG personally reviewed. My interpretation:  22: SR 70s, normal axis. LVH. Qtc 463    Afib  - SR today  - CHADS2-VASc 3 (HTN, DM, female)  - Rivaroxaban  - BB  - 2022: "Cardioversion " "was successfully performed with restoration of normal sinus rhythm."  - Event monitor May 2022: "The baseline rhythm on this 30 day event monitor shows sinus rhythm at 98 bpm. No other strips are available for review."  - EP referral discussed. Defers for now. She would like to monitor with avoidance of chilled drinks.    SOB, chest discomfort  - Echo 2017: LVEF 60-65%, DD1. Normal RVSF. PASP 43. Mild MR, TR  - Echo 2021: LVEF 63%, DD indeterminate. Normal RVSF. Mod LAE, mild AGUSTÍN. PASP 31, CVP 3. Asc aorta mildly dilated. Trivial pericardial effusion  - PET stress test 2021: No ischemia. CT attenuation images demonstrate mild diffuse coronary calcifications in the LAD and RCA territory.  - Echo September 2022  The left ventricle is normal in size with normal systolic function.   Normal left ventricular diastolic function.   The estimated ejection fraction is 55%.   Normal right ventricular size with normal right ventricular systolic function.   Normal central venous pressure (3 mmHg).   The aortic root is mildly dilated. (Asc aorta 4 cm)  - Resolved    Orthopnea, swelling  Resolved  - Torsemide- asked to take as needed (not regularly). Discussed symptoms to watch for and call. Discussed coming to ED for emergent situations.  - Echo CVP 3  - Venous insufficiency US Oct 2022  1. No deep venous thrombosis  2. The right greater saphenous vein has reflux that lasts for up to 2787 milliseconds.  3. The right lesser saphenous vein has reflux that lasts for up to 6084 milliseconds.  - Compression stockings, leg elevation    Ascending aorta mildly dilated  - Echo Sept 2022  The left ventricle is normal in size with normal systolic function.  Normal left ventricular diastolic function.  The estimated ejection fraction is 55%.  Normal right ventricular size with normal right ventricular systolic function.  Normal central venous pressure (3 mmHg).  The aortic root is mildly dilated. (4 cm)  - Repeat Sept 2023    DM  Lab Results "   Component Value Date    HGBA1C 7.0 (H) 03/15/2022   Managed by PCP    HTN  BP well controlled  Amlodipine, carvedilol, HCTZ  Salt restriction    HLD  Lab Results   Component Value Date    LDLCALC 80.0 03/15/2022   Statin as above      Continue with current medical plan and lifestyle changes.    Orders Placed This Encounter   Procedures    EKG 12-lead         Follow up as scheduled; she is requesting a 3 month FUP  Return sooner for concerns or questions. If symptoms persist go to the ED    She expressed verbal understanding and agreed with the plan      Shanika Benavidez MD  Interventional Cardiology  [facility]  Phone: [phone]

## 2023-04-11 ENCOUNTER — OFFICE VISIT (OUTPATIENT)
Dept: CARDIOLOGY | Facility: CLINIC | Age: 63
End: 2023-04-11
Payer: MEDICAID

## 2023-04-11 VITALS
SYSTOLIC BLOOD PRESSURE: 148 MMHG | BODY MASS INDEX: 41.59 KG/M2 | WEIGHT: 243.63 LBS | DIASTOLIC BLOOD PRESSURE: 109 MMHG | HEART RATE: 103 BPM | OXYGEN SATURATION: 92 % | HEIGHT: 64 IN

## 2023-04-11 DIAGNOSIS — E11.9 DIABETES MELLITUS WITHOUT COMPLICATION: ICD-10-CM

## 2023-04-11 DIAGNOSIS — E11.59 HYPERTENSION ASSOCIATED WITH DIABETES: ICD-10-CM

## 2023-04-11 DIAGNOSIS — I77.819 AORTIC DILATATION: ICD-10-CM

## 2023-04-11 DIAGNOSIS — E11.69 HYPERLIPIDEMIA ASSOCIATED WITH TYPE 2 DIABETES MELLITUS: ICD-10-CM

## 2023-04-11 DIAGNOSIS — I15.2 HYPERTENSION ASSOCIATED WITH DIABETES: ICD-10-CM

## 2023-04-11 DIAGNOSIS — E78.5 HYPERLIPIDEMIA ASSOCIATED WITH TYPE 2 DIABETES MELLITUS: ICD-10-CM

## 2023-04-11 DIAGNOSIS — I48.91 ATRIAL FIBRILLATION, UNSPECIFIED TYPE: Primary | ICD-10-CM

## 2023-04-11 PROCEDURE — 1160F RVW MEDS BY RX/DR IN RCRD: CPT | Mod: CPTII,,, | Performed by: INTERNAL MEDICINE

## 2023-04-11 PROCEDURE — 99214 OFFICE O/P EST MOD 30 MIN: CPT | Mod: PBBFAC,PO | Performed by: INTERNAL MEDICINE

## 2023-04-11 PROCEDURE — 99999 PR PBB SHADOW E&M-EST. PATIENT-LVL IV: CPT | Mod: PBBFAC,,, | Performed by: INTERNAL MEDICINE

## 2023-04-11 PROCEDURE — 1160F PR REVIEW ALL MEDS BY PRESCRIBER/CLIN PHARMACIST DOCUMENTED: ICD-10-PCS | Mod: CPTII,,, | Performed by: INTERNAL MEDICINE

## 2023-04-11 PROCEDURE — 3008F BODY MASS INDEX DOCD: CPT | Mod: CPTII,,, | Performed by: INTERNAL MEDICINE

## 2023-04-11 PROCEDURE — 3008F PR BODY MASS INDEX (BMI) DOCUMENTED: ICD-10-PCS | Mod: CPTII,,, | Performed by: INTERNAL MEDICINE

## 2023-04-11 PROCEDURE — 1159F PR MEDICATION LIST DOCUMENTED IN MEDICAL RECORD: ICD-10-PCS | Mod: CPTII,,, | Performed by: INTERNAL MEDICINE

## 2023-04-11 PROCEDURE — 1159F MED LIST DOCD IN RCRD: CPT | Mod: CPTII,,, | Performed by: INTERNAL MEDICINE

## 2023-04-11 PROCEDURE — 99999 PR PBB SHADOW E&M-EST. PATIENT-LVL IV: ICD-10-PCS | Mod: PBBFAC,,, | Performed by: INTERNAL MEDICINE

## 2023-04-11 PROCEDURE — 3080F PR MOST RECENT DIASTOLIC BLOOD PRESSURE >= 90 MM HG: ICD-10-PCS | Mod: CPTII,,, | Performed by: INTERNAL MEDICINE

## 2023-04-11 PROCEDURE — 3077F PR MOST RECENT SYSTOLIC BLOOD PRESSURE >= 140 MM HG: ICD-10-PCS | Mod: CPTII,,, | Performed by: INTERNAL MEDICINE

## 2023-04-11 PROCEDURE — 3077F SYST BP >= 140 MM HG: CPT | Mod: CPTII,,, | Performed by: INTERNAL MEDICINE

## 2023-04-11 PROCEDURE — 99214 OFFICE O/P EST MOD 30 MIN: CPT | Mod: S$PBB,,, | Performed by: INTERNAL MEDICINE

## 2023-04-11 PROCEDURE — 99214 PR OFFICE/OUTPT VISIT, EST, LEVL IV, 30-39 MIN: ICD-10-PCS | Mod: S$PBB,,, | Performed by: INTERNAL MEDICINE

## 2023-04-11 PROCEDURE — 3080F DIAST BP >= 90 MM HG: CPT | Mod: CPTII,,, | Performed by: INTERNAL MEDICINE

## 2023-04-11 NOTE — PROGRESS NOTES
Cardiology Clinic note    Subjective:   Patient ID:  Yeny Hargrove is a 63 y.o. female who presents for AFib FUP    HPI:   SOB, chest discomfort: Resolved. Orthopnea resolved. Leg swelling resolved.    AFIb: ED Dec 2022 for Afib with RVR. Reports episode after having a chilled cold drink. No recurrent episodes of palpitations. No syncope. On AC.    HTN: On medications    HLD: Tolerating statin    SH Tobacco Never used  FH No premature CAD    There are no problems to display for this patient.    PMH  DM  HTN  HLD    Patient's Medications   New Prescriptions    No medications on file   Previous Medications    ACETAMINOPHEN-CODEINE 300-15MG (TYLENOL #2) 300-15 MG PER TABLET    Take 1 tablet by mouth every 4 (four) hours as needed for Pain.    ALBUTEROL (PROVENTIL/VENTOLIN HFA) 90 MCG/ACTUATION INHALER    Ventolin HFA 90 mcg/actuation aerosol inhaler   Inhale 2 puffs 4 times a day by inhalation route.    ALBUTEROL (PROVENTIL/VENTOLIN HFA) 90 MCG/ACTUATION INHALER    albuterol sulfate HFA 90 mcg/actuation aerosol inhaler    ALLERGY RELIEF, LORATADINE, 10 MG TABLET    Take 10 mg by mouth once daily.    AMITRIPTYLINE (ELAVIL) 25 MG TABLET    Take 25 mg by mouth nightly as needed for Insomnia.    AMLODIPINE (NORVASC) 10 MG TABLET        AMOXICILLIN (AMOXIL) 875 MG TABLET    Take 875 mg by mouth 2 (two) times daily.    ANASTROZOLE (ARIMIDEX) 1 MG TAB    Take 1 mg by mouth once daily.    ATORVASTATIN (LIPITOR) 80 MG TABLET    Take 1 tablet (80 mg total) by mouth once daily.    BACLOFEN (LIORESAL) 5 MG TAB TABLET    baclofen 5 mg tablet    BENZONATATE (TESSALON) 100 MG CAPSULE    Tessalon Perles 100 mg capsule   Take 1 capsule every day by oral route for 30 days.    CARVEDILOL (COREG) 6.25 MG TABLET    Take 1 tablet (6.25 mg total) by mouth 2 (two) times daily with meals.    DICLOFENAC (VOLTAREN) 50 MG EC TABLET    Take 50 mg by mouth 2 (two) times daily.    FERROUS SULFATE (FEOSOL) 325 MG (65 MG IRON) TAB TABLET    FeroSul  325 mg (65 mg iron) tablet    FLUTICASONE PROPIONATE (FLONASE) 50 MCG/ACTUATION NASAL SPRAY    fluticasone propionate 50 mcg/actuation nasal spray,suspension    FLUTICASONE PROPIONATE (FLOVENT HFA) 220 MCG/ACTUATION INHALER    Flovent  mcg/actuation aerosol inhaler   Inhale 2 puffs twice a day by inhalation route for 30 days.    HYDROCHLOROTHIAZIDE (HYDRODIURIL) 25 MG TABLET    hydrochlorothiazide 25 mg tablet   Take 1 tablet every day by oral route for 30 days.    LINACLOTIDE (LINZESS) 145 MCG CAP CAPSULE    Linzess 145 mcg capsule   Take 1 capsule every day by oral route in the morning for 90 days.    METFORMIN (GLUCOPHAGE) 500 MG TABLET    Take 500 mg by mouth 2 (two) times daily.    METOPROLOL TARTRATE (LOPRESSOR) 25 MG TABLET    Take 1 tablet (25 mg total) by mouth 2 (two) times daily.    OMEPRAZOLE (PRILOSEC) 40 MG CAPSULE    Take 40 mg by mouth once daily.    PROMETHAZINE-CODEINE 6.25-10 MG/5 ML (PHENERGAN WITH CODEINE) 6.25-10 MG/5 ML SYRUP    5 mLs.    STIOLTO RESPIMAT 2.5-2.5 MCG/ACTUATION MIST    SMARTSI Puff(s) Via Inhaler Daily    TORSEMIDE (DEMADEX) 20 MG TAB    Take 1 tablet (20 mg total) by mouth once daily.    XARELTO 20 MG TAB    TAKE ONE TABLET BY MOUTH EVERY DAY   Modified Medications    No medications on file   Discontinued Medications    No medications on file        Review of Systems   Constitutional: Negative for fever.   HENT:  Negative for nosebleeds.    Cardiovascular:  Negative for chest pain, dyspnea on exertion, irregular heartbeat, leg swelling, near-syncope, orthopnea, palpitations, paroxysmal nocturnal dyspnea and syncope.        As above   Respiratory:  Negative for hemoptysis.    Hematologic/Lymphatic: Negative for bleeding problem.   Skin:  Negative for poor wound healing.   Gastrointestinal:  Negative for hematochezia.   Genitourinary:  Negative for hematuria.   Neurological:  Negative for light-headedness.   Allergic/Immunologic: Negative for persistent infections.  "      Objective:   Vitals  Vitals:    23 1452   BP: (!) 148/109   Pulse: 103   SpO2: (!) 92%   Weight: 110.5 kg (243 lb 9.7 oz)   Height: 5' 4" (1.626 m)     Right Arm BP - Sittin/109    Physical Exam  Constitutional:       General: She is not in acute distress.     Appearance: She is well-developed. She is not diaphoretic.   HENT:      Head: Normocephalic.   Neck:      Vascular: No JVD.   Cardiovascular:      Rate and Rhythm: Normal rate and regular rhythm.      Heart sounds: No murmur heard.    No friction rub. No gallop.   Pulmonary:      Effort: Pulmonary effort is normal. No respiratory distress.      Breath sounds: Normal breath sounds.   Abdominal:      Palpations: Abdomen is soft.      Tenderness: There is no abdominal tenderness.   Musculoskeletal:         General: No swelling.      Cervical back: Normal range of motion.   Skin:     General: Skin is warm.   Neurological:      Mental Status: She is alert.   Psychiatric:         Mood and Affect: Mood normal.           Assessment:     1. Atrial fibrillation, unspecified type    2. Aortic dilatation    3. Diabetes mellitus without complication    4. Hypertension associated with diabetes    5. Hyperlipidemia associated with type 2 diabetes mellitus        Plan:   Yeny Hargrove is a 63 y.o. female h/o Afib, DM, HTN, HLD  Asthma  Breast cancer    - Used to follow with Dr Sharp (used to be in [place]- now moved [place]). She wanted to stay local.    - Reports had a stress test some years back which she reports she had a scar attributed to her prior radiation treatment (h/o breast cancer 2007 XRT, sp mastectomy )    - ED Dec 2022 for CP, palpitations. Noted Afib with RVR (known h/o Afib). Improved with metoprolol. Dc'd for clinic FUP.    EKG personally reviewed. My interpretation:  22: SR 70s, normal axis. LVH. Qtc 463    Afib  - CHADS2-VASc 3 (HTN, DM, female)  - Rivaroxaban  - BB  - 2022: "Cardioversion was successfully performed with " "restoration of normal sinus rhythm."  - Event monitor May 2022: "The baseline rhythm on this 30 day event monitor shows sinus rhythm at 98 bpm. No other strips are available for review."  - EP referral discussed. Defers for now. She would like to monitor with avoidance of chilled drinks.    SOB, chest discomfort  - Echo 2017: LVEF 60-65%, DD1. Normal RVSF. PASP 43. Mild MR, TR  - Echo 2021: LVEF 63%, DD indeterminate. Normal RVSF. Mod LAE, mild AGUSTÍN. PASP 31, CVP 3. Asc aorta mildly dilated. Trivial pericardial effusion  - PET stress test 2021: No ischemia. CT attenuation images demonstrate mild diffuse coronary calcifications in the LAD and RCA territory.  - Echo September 2022  The left ventricle is normal in size with normal systolic function.   Normal left ventricular diastolic function.   The estimated ejection fraction is 55%.   Normal right ventricular size with normal right ventricular systolic function.   Normal central venous pressure (3 mmHg).   The aortic root is mildly dilated. (Asc aorta 4 cm)  - Resolved    Orthopnea, swelling  Resolved  - Torsemide- asked to take as needed (not regularly). Discussed symptoms to watch for and call. Discussed coming to ED for emergent situations.  - Echo CVP 3  - Venous insufficiency US Oct 2022  1. No deep venous thrombosis  2. The right greater saphenous vein has reflux that lasts for up to 2787 milliseconds.  3. The right lesser saphenous vein has reflux that lasts for up to 6084 milliseconds.  - Compression stockings, leg elevation    Ascending aorta mildly dilated  - Echo Sept 2022  The left ventricle is normal in size with normal systolic function.  Normal left ventricular diastolic function.  The estimated ejection fraction is 55%.  Normal right ventricular size with normal right ventricular systolic function.  Normal central venous pressure (3 mmHg).  The aortic root is mildly dilated. (4 cm)  - Repeat Sept 2023    DM  Lab Results   Component Value Date    " HGBA1C 7.0 (H) 03/15/2022   Managed by PCP    HTN  BP not well controlled- endorses increased salt intake  Reports some fluif build up recently. Asked to take torsemide bid for 2 days. FUP with weight log next week  Amlodipine, carvedilol, HCTZ  Salt restriction    HLD  Lab Results   Component Value Date    LDLCALC 80.0 03/15/2022   Statin as above      Continue with current medical plan and lifestyle changes.    No orders of the defined types were placed in this encounter.        Follow up as scheduled; she is requesting a 3 month FUP  Return sooner for concerns or questions. If symptoms persist go to the ED    She expressed verbal understanding and agreed with the plan      Shanika Benavidez MD  Interventional Cardiology  [facility]  Phone: [phone]

## 2023-05-26 ENCOUNTER — OFFICE VISIT (OUTPATIENT)
Dept: CARDIOLOGY | Facility: CLINIC | Age: 63
End: 2023-05-26
Payer: MEDICAID

## 2023-05-26 VITALS
OXYGEN SATURATION: 96 % | SYSTOLIC BLOOD PRESSURE: 167 MMHG | WEIGHT: 242.13 LBS | HEART RATE: 103 BPM | HEIGHT: 64 IN | DIASTOLIC BLOOD PRESSURE: 117 MMHG | BODY MASS INDEX: 41.34 KG/M2

## 2023-05-26 DIAGNOSIS — E11.59 HYPERTENSION ASSOCIATED WITH DIABETES: Primary | ICD-10-CM

## 2023-05-26 DIAGNOSIS — I15.2 HYPERTENSION ASSOCIATED WITH DIABETES: Primary | ICD-10-CM

## 2023-05-26 PROCEDURE — 3008F PR BODY MASS INDEX (BMI) DOCUMENTED: ICD-10-PCS | Mod: CPTII,,, | Performed by: INTERNAL MEDICINE

## 2023-05-26 PROCEDURE — 1159F PR MEDICATION LIST DOCUMENTED IN MEDICAL RECORD: ICD-10-PCS | Mod: CPTII,,, | Performed by: INTERNAL MEDICINE

## 2023-05-26 PROCEDURE — 99212 OFFICE O/P EST SF 10 MIN: CPT | Mod: S$PBB,,, | Performed by: INTERNAL MEDICINE

## 2023-05-26 PROCEDURE — 99213 OFFICE O/P EST LOW 20 MIN: CPT | Mod: PBBFAC,PO | Performed by: INTERNAL MEDICINE

## 2023-05-26 PROCEDURE — 3008F BODY MASS INDEX DOCD: CPT | Mod: CPTII,,, | Performed by: INTERNAL MEDICINE

## 2023-05-26 PROCEDURE — 1160F RVW MEDS BY RX/DR IN RCRD: CPT | Mod: CPTII,,, | Performed by: INTERNAL MEDICINE

## 2023-05-26 PROCEDURE — 1159F MED LIST DOCD IN RCRD: CPT | Mod: CPTII,,, | Performed by: INTERNAL MEDICINE

## 2023-05-26 PROCEDURE — 3080F DIAST BP >= 90 MM HG: CPT | Mod: CPTII,,, | Performed by: INTERNAL MEDICINE

## 2023-05-26 PROCEDURE — 1160F PR REVIEW ALL MEDS BY PRESCRIBER/CLIN PHARMACIST DOCUMENTED: ICD-10-PCS | Mod: CPTII,,, | Performed by: INTERNAL MEDICINE

## 2023-05-26 PROCEDURE — 99999 PR PBB SHADOW E&M-EST. PATIENT-LVL III: ICD-10-PCS | Mod: PBBFAC,,, | Performed by: INTERNAL MEDICINE

## 2023-05-26 PROCEDURE — 3077F PR MOST RECENT SYSTOLIC BLOOD PRESSURE >= 140 MM HG: ICD-10-PCS | Mod: CPTII,,, | Performed by: INTERNAL MEDICINE

## 2023-05-26 PROCEDURE — 99999 PR PBB SHADOW E&M-EST. PATIENT-LVL III: CPT | Mod: PBBFAC,,, | Performed by: INTERNAL MEDICINE

## 2023-05-26 PROCEDURE — 3080F PR MOST RECENT DIASTOLIC BLOOD PRESSURE >= 90 MM HG: ICD-10-PCS | Mod: CPTII,,, | Performed by: INTERNAL MEDICINE

## 2023-05-26 PROCEDURE — 99212 PR OFFICE/OUTPT VISIT, EST, LEVL II, 10-19 MIN: ICD-10-PCS | Mod: S$PBB,,, | Performed by: INTERNAL MEDICINE

## 2023-05-26 PROCEDURE — 3077F SYST BP >= 140 MM HG: CPT | Mod: CPTII,,, | Performed by: INTERNAL MEDICINE

## 2023-05-26 RX ORDER — CARVEDILOL 6.25 MG/1
6.25 TABLET ORAL 2 TIMES DAILY WITH MEALS
Qty: 180 TABLET | Refills: 3 | Status: SHIPPED | OUTPATIENT
Start: 2023-05-26 | End: 2024-05-25

## 2023-05-26 NOTE — PROGRESS NOTES
Vitals:    05/26/23 0851   BP: (!) 167/117   Pulse: 103     Reports has not taken carvedilol this morning  Reports BP better at home but does not have a log  Did have increased salt intake recently with swelling in the legs- improving  Asked to increase torsemide to daily for 3-4 days. Go back to every other day once resolved  Prescription given for carvedilol refill  Maintain log  Call in 2 weeks with log- increase carvedilol if not well controlled

## 2023-05-29 DIAGNOSIS — C50.212 MALIGNANT NEOPLASM OF UPPER-INNER QUADRANT OF LEFT BREAST IN FEMALE, ESTROGEN RECEPTOR POSITIVE: Primary | ICD-10-CM

## 2023-05-29 DIAGNOSIS — Z17.0 MALIGNANT NEOPLASM OF UPPER-INNER QUADRANT OF LEFT BREAST IN FEMALE, ESTROGEN RECEPTOR POSITIVE: Primary | ICD-10-CM

## 2023-05-29 NOTE — PROGRESS NOTES
[facility]  MD Abe García, NP-C    Chief Complaint:   Yeny Hargrove is a 63 y.o. female presenting today for  6 month follow up for her breast cancer issues. She is due for Physical Examination, Mammogram, and Chest Xray  She reports no interval changes on her self-breast examination.     History of Present Illness:   Mrs. Hargrove was diagnosed with left breast cancer in December 2007. She elected Lumpectomy and SLN biopsy on 2-5-2008 that showed a 0.6 cm Grade II IDC with extensive and multifocal DCIS with negative SLNs x 2 with negative margins. She completed adjuvant Radiation and completed 5 years of Tamoxifen. Stage I, [id number]. In May 2018, she was diagnosed another left breast cancer (at the age of 58) - DCIS. She elected mastectomy that showed a 2.7 mm Grade II IDC with 11 mm of DCIS. Margins were negative. ER/NC Positive and HER 2 FISH negative. She has been on Arimidex since July 2018. [facility] Comprehensive Panel Negative (2018) MDs::: Gail Dow MD.    Past Medical History:   Diagnosis Date    Asthma     Breast cancer     Estrogen receptor positive 6/5/2023    Hypertension       Past Surgical History:   Procedure Laterality Date    BREAST LUMPECTOMY      HYSTERECTOMY      MASTECTOMY      TREATMENT OF CARDIAC ARRHYTHMIA N/A 3/16/2022    Procedure: Cardioversion or Defibrillation;  Surgeon: Shanika Benavidze MD;  Location: [facility] CATH LAB/EP;  Service: Cardiology;  Laterality: N/A;        Current Outpatient Medications:     acetaminophen-codeine 300-15mg (TYLENOL #2) 300-15 mg per tablet, Take 1 tablet by mouth every 4 (four) hours as needed for Pain., Disp: , Rfl:     albuterol (PROVENTIL/VENTOLIN HFA) 90 mcg/actuation inhaler, Ventolin HFA 90 mcg/actuation aerosol inhaler  Inhale 2 puffs 4 times a day by inhalation route., Disp: , Rfl:     albuterol (PROVENTIL/VENTOLIN HFA) 90 mcg/actuation inhaler, albuterol sulfate HFA 90 mcg/actuation aerosol  inhaler, Disp: , Rfl:     ALLERGY RELIEF, LORATADINE, 10 mg tablet, Take 10 mg by mouth once daily., Disp: , Rfl:     amitriptyline (ELAVIL) 25 MG tablet, Take 25 mg by mouth nightly as needed for Insomnia., Disp: , Rfl:     amLODIPine (NORVASC) 10 MG tablet, , Disp: , Rfl:     amoxicillin (AMOXIL) 875 MG tablet, Take 875 mg by mouth 2 (two) times daily., Disp: , Rfl:     anastrozole (ARIMIDEX) 1 mg Tab, Take 1 mg by mouth once daily., Disp: , Rfl:     atorvastatin (LIPITOR) 80 MG tablet, Take 1 tablet (80 mg total) by mouth once daily., Disp: 30 tablet, Rfl: 11    baclofen (LIORESAL) 5 mg Tab tablet, baclofen 5 mg tablet, Disp: , Rfl:     benzonatate (TESSALON) 100 MG capsule, Tessalon Perles 100 mg capsule  Take 1 capsule every day by oral route for 30 days., Disp: , Rfl:     carvediloL (COREG) 6.25 MG tablet, Take 1 tablet (6.25 mg total) by mouth 2 (two) times daily with meals., Disp: 180 tablet, Rfl: 3    diclofenac (VOLTAREN) 50 MG EC tablet, Take 50 mg by mouth 2 (two) times daily., Disp: , Rfl:     ferrous sulfate (FEOSOL) 325 mg (65 mg iron) Tab tablet, FeroSul 325 mg (65 mg iron) tablet, Disp: , Rfl:     fluticasone propionate (FLONASE) 50 mcg/actuation nasal spray, fluticasone propionate 50 mcg/actuation nasal spray,suspension, Disp: , Rfl:     fluticasone propionate (FLOVENT HFA) 220 mcg/actuation inhaler, Flovent  mcg/actuation aerosol inhaler  Inhale 2 puffs twice a day by inhalation route for 30 days., Disp: , Rfl:     linaCLOtide (LINZESS) 145 mcg Cap capsule, Linzess 145 mcg capsule  Take 1 capsule every day by oral route in the morning for 90 days., Disp: , Rfl:     metFORMIN (GLUCOPHAGE) 500 MG tablet, Take 500 mg by mouth 2 (two) times daily., Disp: , Rfl:     omeprazole (PRILOSEC) 40 MG capsule, Take 40 mg by mouth once daily., Disp: , Rfl:     promethazine-codeine 6.25-10 mg/5 ml (PHENERGAN WITH CODEINE) 6.25-10 mg/5 mL syrup, 5 mLs., Disp: , Rfl:     STIOLTO RESPIMAT 2.5-2.5  mcg/actuation Mist, SMARTSI Puff(s) Via Inhaler Daily, Disp: , Rfl:     torsemide (DEMADEX) 20 MG Tab, Take 1 tablet (20 mg total) by mouth once daily., Disp: 30 tablet, Rfl: 11    XARELTO 20 mg Tab, TAKE ONE TABLET BY MOUTH EVERY DAY, Disp: 30 tablet, Rfl: 11   Review of patient's allergies indicates:   Allergen Reactions    Iodine and iodide containing products Swelling    Shellfish containing products Swelling    Iodinated contrast media Swelling    Pcn [penicillins] Swelling      Social History     Tobacco Use    Smoking status: Never    Smokeless tobacco: Never   Substance Use Topics    Alcohol use: No      Family History   Problem Relation Age of Onset    Eclampsia Neg Hx     Diabetes Neg Hx     Colon cancer Neg Hx     Cancer Neg Hx     Breast cancer Neg Hx     Hypertension Neg Hx     Miscarriages / Stillbirths Neg Hx     Ovarian cancer Neg Hx      labor Neg Hx     Stroke Neg Hx         Review of Systems   Integumentary:  Negative for color change, rash, mole/lesion, breast mass, breast discharge and breast tenderness.   Breast: Negative for mass and tenderness     Physical Exam   Constitutional: She is cooperative.   HENT:   Head: Normocephalic.   Pulmonary/Chest: She exhibits no mass. Right breast exhibits no inverted nipple, no mass, no nipple discharge, no skin change and no tenderness. Left breast exhibits no mass, no skin change and no tenderness. No breast swelling.   The left breast is surgically absent with no signs of malignancy or recurrence.    Abdominal: Normal appearance.   Genitourinary: No breast swelling.   Musculoskeletal: Lymphadenopathy:      Upper Body:      Right upper body: No supraclavicular or axillary adenopathy.      Left upper body: No supraclavicular or axillary adenopathy.     Neurological: She is alert.   Skin: No rash noted.      MAMMOGRAM REPORT: She has some diffuse fibronodular tissue bilaterally; there are no spiculated lesions, distortions or suspicious  calcifications noted; [facility]    CXR REPORT: FINDINGS: Mild elevation of the right hemidiaphragm. Mild basilar band-like scarring. No pleural fluid. Cardiac silhouette is upper normal in size. The aorta is mildly tortuous. Degenerative changes in the thoracic spine and mild kyphosis. Prior left mastectomy with left axillary surgical clips.         Impression: No acute cardiopulmonary disease identified.    NOTE:::We viewed her films together at today's visit.  We discussed the multiple views obtained and the important findings.  Even benign changes were mentioned and her questions were answered.  She knows that she may receive a formal letter or report from the Radiologist.  She is to contact us if she has questions.        Assessment/Plan  1. Malignant neoplasm of upper-inner quadrant of left breast in female, estrogen receptor positive  Assessment & Plan:   Patient is doing well and is being followed according to NCCN Guidelines. She understands that her imaging and exams have remained stable and is comfortable being followed in a conservative fashion. She understands the importance of monthly self-breast examination and knows to report any and all changes as they occur.        2. Estrogen receptor positive  Assessment & Plan:  Continue Arimidex             Medical Decision Making:  It is my impression that this patient suffers all conditions contained in this medical document.  Each of these conditions did affect our plan of care and my medical decision making today.  It is my opinion that the medical decision making concerning this patient was of moderate difficulty based on the aforementioned conditions.  Any further recommendations will be communicated to the patient by me.  I have reviewed and verified her allergies, list of medications, medical and surgical histories, social history, and a pertinent review of symptoms.      Follow up:  6 months and prn    For:  PE

## 2023-06-05 ENCOUNTER — OFFICE VISIT (OUTPATIENT)
Dept: SURGERY | Facility: CLINIC | Age: 63
End: 2023-06-05
Payer: MEDICAID

## 2023-06-05 DIAGNOSIS — Z17.0 MALIGNANT NEOPLASM OF UPPER-INNER QUADRANT OF LEFT BREAST IN FEMALE, ESTROGEN RECEPTOR POSITIVE: Primary | ICD-10-CM

## 2023-06-05 DIAGNOSIS — Z17.0 ESTROGEN RECEPTOR POSITIVE: ICD-10-CM

## 2023-06-05 DIAGNOSIS — C50.212 MALIGNANT NEOPLASM OF UPPER-INNER QUADRANT OF LEFT BREAST IN FEMALE, ESTROGEN RECEPTOR POSITIVE: Primary | ICD-10-CM

## 2023-06-05 LAB
ALBUMIN SERPL BCP-MCNC: 3.7 G/DL (ref 3.5–5.2)
ALP SERPL-CCNC: 107 U/L (ref 55–135)
ALT SERPL W/O P-5'-P-CCNC: 9 U/L (ref 10–44)
ANION GAP SERPL CALC-SCNC: 7 MMOL/L (ref 8–16)
AST SERPL-CCNC: 12 U/L (ref 10–40)
BASOPHILS # BLD AUTO: 0.04 K/UL (ref 0–0.2)
BASOPHILS NFR BLD: 0.6 % (ref 0–1.9)
BILIRUB SERPL-MCNC: 0.4 MG/DL (ref 0.1–1)
BUN SERPL-MCNC: 8 MG/DL (ref 8–23)
CALCIUM SERPL-MCNC: 9.4 MG/DL (ref 8.7–10.5)
CHLORIDE SERPL-SCNC: 105 MMOL/L (ref 95–110)
CO2 SERPL-SCNC: 31 MMOL/L (ref 23–29)
CREAT SERPL-MCNC: 0.9 MG/DL (ref 0.5–1.4)
DIFFERENTIAL METHOD: ABNORMAL
EOSINOPHIL # BLD AUTO: 0.1 K/UL (ref 0–0.5)
EOSINOPHIL NFR BLD: 1.3 % (ref 0–8)
ERYTHROCYTE [DISTWIDTH] IN BLOOD BY AUTOMATED COUNT: 18.1 % (ref 11.5–14.5)
EST. GFR  (NO RACE VARIABLE): >60 ML/MIN/1.73 M^2
GLUCOSE SERPL-MCNC: 112 MG/DL (ref 70–110)
HCT VFR BLD AUTO: 41.3 % (ref 37–48.5)
HGB BLD-MCNC: 12.1 G/DL (ref 12–16)
IMM GRANULOCYTES # BLD AUTO: 0.01 K/UL (ref 0–0.04)
IMM GRANULOCYTES NFR BLD AUTO: 0.1 % (ref 0–0.5)
LDH SERPL L TO P-CCNC: 230 U/L (ref 110–260)
LYMPHOCYTES # BLD AUTO: 2.4 K/UL (ref 1–4.8)
LYMPHOCYTES NFR BLD: 33.6 % (ref 18–48)
MCH RBC QN AUTO: 26.1 PG (ref 27–31)
MCHC RBC AUTO-ENTMCNC: 29.3 G/DL (ref 32–36)
MCV RBC AUTO: 89 FL (ref 82–98)
MONOCYTES # BLD AUTO: 0.6 K/UL (ref 0.3–1)
MONOCYTES NFR BLD: 8.2 % (ref 4–15)
NEUTROPHILS # BLD AUTO: 4 K/UL (ref 1.8–7.7)
NEUTROPHILS NFR BLD: 56.2 % (ref 38–73)
NRBC BLD-RTO: 0 /100 WBC
PLATELET # BLD AUTO: 283 K/UL (ref 150–450)
PMV BLD AUTO: 11.5 FL (ref 9.2–12.9)
POTASSIUM SERPL-SCNC: 3.7 MMOL/L (ref 3.5–5.1)
PROT SERPL-MCNC: 7.5 G/DL (ref 6–8.4)
RBC # BLD AUTO: 4.64 M/UL (ref 4–5.4)
SODIUM SERPL-SCNC: 143 MMOL/L (ref 136–145)
WBC # BLD AUTO: 7.08 K/UL (ref 3.9–12.7)

## 2023-06-05 PROCEDURE — 99214 PR OFFICE/OUTPT VISIT, EST, LEVL IV, 30-39 MIN: ICD-10-PCS | Mod: S$GLB,,, | Performed by: NURSE PRACTITIONER

## 2023-06-05 PROCEDURE — 82378 CARCINOEMBRYONIC ANTIGEN: CPT | Performed by: NURSE PRACTITIONER

## 2023-06-05 PROCEDURE — 99214 OFFICE O/P EST MOD 30 MIN: CPT | Mod: S$GLB,,, | Performed by: NURSE PRACTITIONER

## 2023-06-05 PROCEDURE — 83615 LACTATE (LD) (LDH) ENZYME: CPT | Performed by: NURSE PRACTITIONER

## 2023-06-05 PROCEDURE — 86300 IMMUNOASSAY TUMOR CA 15-3: CPT | Performed by: NURSE PRACTITIONER

## 2023-06-05 PROCEDURE — 80053 COMPREHEN METABOLIC PANEL: CPT | Performed by: NURSE PRACTITIONER

## 2023-06-05 PROCEDURE — 85025 COMPLETE CBC W/AUTO DIFF WBC: CPT | Performed by: NURSE PRACTITIONER

## 2023-06-06 LAB — CEA SERPL-MCNC: 2.6 NG/ML (ref 0–5)

## 2023-06-08 LAB — CANCER AG27-29 SERPL-ACNC: <12 U/ML

## 2023-06-14 ENCOUNTER — TELEPHONE (OUTPATIENT)
Dept: CARDIOLOGY | Facility: CLINIC | Age: 63
End: 2023-06-14
Payer: MEDICAID

## 2024-01-05 ENCOUNTER — OFFICE VISIT (OUTPATIENT)
Dept: SURGERY | Facility: CLINIC | Age: 64
End: 2024-01-05

## 2024-01-05 DIAGNOSIS — Z17.0 ESTROGEN RECEPTOR POSITIVE: ICD-10-CM

## 2024-01-05 DIAGNOSIS — C50.212 MALIGNANT NEOPLASM OF UPPER-INNER QUADRANT OF LEFT BREAST IN FEMALE, ESTROGEN RECEPTOR POSITIVE: Primary | Chronic | ICD-10-CM

## 2024-01-05 DIAGNOSIS — Z17.0 MALIGNANT NEOPLASM OF UPPER-INNER QUADRANT OF LEFT BREAST IN FEMALE, ESTROGEN RECEPTOR POSITIVE: Primary | Chronic | ICD-10-CM

## 2024-01-05 LAB
ALBUMIN SERPL BCP-MCNC: 3.9 G/DL (ref 3.5–5.2)
ALP SERPL-CCNC: 102 U/L (ref 55–135)
ALT SERPL W/O P-5'-P-CCNC: 9 U/L (ref 10–44)
ANION GAP SERPL CALC-SCNC: 10 MMOL/L (ref 8–16)
AST SERPL-CCNC: 13 U/L (ref 10–40)
BASOPHILS # BLD AUTO: 0.05 K/UL (ref 0–0.2)
BASOPHILS NFR BLD: 1 % (ref 0–1.9)
BILIRUB SERPL-MCNC: 0.6 MG/DL (ref 0.1–1)
BUN SERPL-MCNC: 8 MG/DL (ref 8–23)
CALCIUM SERPL-MCNC: 9.1 MG/DL (ref 8.7–10.5)
CEA SERPL-MCNC: 3 NG/ML (ref 0–5)
CHLORIDE SERPL-SCNC: 106 MMOL/L (ref 95–110)
CO2 SERPL-SCNC: 26 MMOL/L (ref 23–29)
CREAT SERPL-MCNC: 0.9 MG/DL (ref 0.5–1.4)
DIFFERENTIAL METHOD BLD: ABNORMAL
EOSINOPHIL # BLD AUTO: 0.1 K/UL (ref 0–0.5)
EOSINOPHIL NFR BLD: 1 % (ref 0–8)
ERYTHROCYTE [DISTWIDTH] IN BLOOD BY AUTOMATED COUNT: 16.6 % (ref 11.5–14.5)
EST. GFR  (NO RACE VARIABLE): >60 ML/MIN/1.73 M^2
GLUCOSE SERPL-MCNC: 130 MG/DL (ref 70–110)
HCT VFR BLD AUTO: 43.2 % (ref 37–48.5)
HGB BLD-MCNC: 13.3 G/DL (ref 12–16)
IMM GRANULOCYTES # BLD AUTO: 0.02 K/UL (ref 0–0.04)
IMM GRANULOCYTES NFR BLD AUTO: 0.4 % (ref 0–0.5)
LDH SERPL L TO P-CCNC: 228 U/L (ref 110–260)
LYMPHOCYTES # BLD AUTO: 2.1 K/UL (ref 1–4.8)
LYMPHOCYTES NFR BLD: 42.3 % (ref 18–48)
MCH RBC QN AUTO: 26.5 PG (ref 27–31)
MCHC RBC AUTO-ENTMCNC: 30.8 G/DL (ref 32–36)
MCV RBC AUTO: 86 FL (ref 82–98)
MONOCYTES # BLD AUTO: 0.5 K/UL (ref 0.3–1)
MONOCYTES NFR BLD: 10.5 % (ref 4–15)
NEUTROPHILS # BLD AUTO: 2.3 K/UL (ref 1.8–7.7)
NEUTROPHILS NFR BLD: 44.8 % (ref 38–73)
NRBC BLD-RTO: 0 /100 WBC
PLATELET # BLD AUTO: 283 K/UL (ref 150–450)
PMV BLD AUTO: 10.7 FL (ref 9.2–12.9)
POTASSIUM SERPL-SCNC: 4 MMOL/L (ref 3.5–5.1)
PROT SERPL-MCNC: 7.8 G/DL (ref 6–8.4)
RBC # BLD AUTO: 5.02 M/UL (ref 4–5.4)
SODIUM SERPL-SCNC: 142 MMOL/L (ref 136–145)
WBC # BLD AUTO: 5.03 K/UL (ref 3.9–12.7)

## 2024-01-05 PROCEDURE — 85025 COMPLETE CBC W/AUTO DIFF WBC: CPT | Performed by: NURSE PRACTITIONER

## 2024-01-05 PROCEDURE — 99213 OFFICE O/P EST LOW 20 MIN: CPT | Mod: PBBFAC,PN | Performed by: NURSE PRACTITIONER

## 2024-01-05 PROCEDURE — 83615 LACTATE (LD) (LDH) ENZYME: CPT | Performed by: NURSE PRACTITIONER

## 2024-01-05 PROCEDURE — 82378 CARCINOEMBRYONIC ANTIGEN: CPT | Performed by: NURSE PRACTITIONER

## 2024-01-05 PROCEDURE — 99999 PR PBB SHADOW E&M-EST. PATIENT-LVL III: CPT | Mod: PBBFAC,,, | Performed by: NURSE PRACTITIONER

## 2024-01-05 PROCEDURE — 99214 OFFICE O/P EST MOD 30 MIN: CPT | Mod: S$PBB,,, | Performed by: NURSE PRACTITIONER

## 2024-01-05 PROCEDURE — 80053 COMPREHEN METABOLIC PANEL: CPT | Performed by: NURSE PRACTITIONER

## 2024-01-05 PROCEDURE — 86300 IMMUNOASSAY TUMOR CA 15-3: CPT | Performed by: NURSE PRACTITIONER

## 2024-01-05 RX ORDER — ANASTROZOLE 1 MG/1
1 TABLET ORAL DAILY
Qty: 90 TABLET | Refills: 1 | Status: SHIPPED | OUTPATIENT
Start: 2024-01-05

## 2024-01-05 NOTE — PROGRESS NOTES
[facility]  Eric Castellanos MD, FACS  Abe Herndon NP-C      Date of Service: 1/5/2024    Chief Complaint:   Yeny Hargrove is a 63 y.o. female presenting today for her 6 month follow up to her breast cancer diagnosis.  She is due for a Physical Examination.  She reports no interval changes. She wants to do extended Arimidex    History of Present Illness:   Mrs. Hargrove was diagnosed with left breast cancer in December 2007. She elected Lumpectomy and SLN biopsy on 2-5-2008 that showed a 0.6 cm Grade II IDC with extensive and multifocal DCIS with negative SLNs x 2 with negative margins. She completed adjuvant Radiation and completed 5 years of Tamoxifen. Stage I, [id number]. In May 2018, she was diagnosed another left breast cancer (at the age of 58) - DCIS. She elected mastectomy that showed a 2.7 mm Grade II IDC with 11 mm of DCIS. Margins were negative. ER/MD Positive and HER 2 FISH negative. She has been on Arimidex since July 2018. [facility] Comprehensive Panel Negative (2018) MDs::: Gail Dow MD([place]).       Past Medical History:   Diagnosis Date    Asthma     Breast cancer     Estrogen receptor positive 6/5/2023    Hypertension       Past Surgical History:   Procedure Laterality Date    BREAST LUMPECTOMY      HYSTERECTOMY      MASTECTOMY      TREATMENT OF CARDIAC ARRHYTHMIA N/A 3/16/2022    Procedure: Cardioversion or Defibrillation;  Surgeon: Shanika Benavidez MD;  Location: [facility] CATH LAB/EP;  Service: Cardiology;  Laterality: N/A;        Current Outpatient Medications:     acetaminophen-codeine 300-15mg (TYLENOL #2) 300-15 mg per tablet, Take 1 tablet by mouth every 4 (four) hours as needed for Pain., Disp: , Rfl:     albuterol (PROVENTIL/VENTOLIN HFA) 90 mcg/actuation inhaler, Ventolin HFA 90 mcg/actuation aerosol inhaler  Inhale 2 puffs 4 times a day by inhalation route., Disp: , Rfl:     albuterol (PROVENTIL/VENTOLIN HFA) 90 mcg/actuation inhaler, albuterol  sulfate HFA 90 mcg/actuation aerosol inhaler, Disp: , Rfl:     ALLERGY RELIEF, LORATADINE, 10 mg tablet, Take 10 mg by mouth once daily., Disp: , Rfl:     amitriptyline (ELAVIL) 25 MG tablet, Take 25 mg by mouth nightly as needed for Insomnia., Disp: , Rfl:     amLODIPine (NORVASC) 10 MG tablet, , Disp: , Rfl:     amoxicillin (AMOXIL) 875 MG tablet, Take 875 mg by mouth 2 (two) times daily., Disp: , Rfl:     anastrozole (ARIMIDEX) 1 mg Tab, Take 1 mg by mouth once daily., Disp: , Rfl:     atorvastatin (LIPITOR) 80 MG tablet, Take 1 tablet (80 mg total) by mouth once daily., Disp: 30 tablet, Rfl: 11    baclofen (LIORESAL) 5 mg Tab tablet, baclofen 5 mg tablet, Disp: , Rfl:     benzonatate (TESSALON) 100 MG capsule, Tessalon Perles 100 mg capsule  Take 1 capsule every day by oral route for 30 days., Disp: , Rfl:     carvediloL (COREG) 6.25 MG tablet, Take 1 tablet (6.25 mg total) by mouth 2 (two) times daily with meals., Disp: 180 tablet, Rfl: 3    diclofenac (VOLTAREN) 50 MG EC tablet, Take 50 mg by mouth 2 (two) times daily., Disp: , Rfl:     ferrous sulfate (FEOSOL) 325 mg (65 mg iron) Tab tablet, FeroSul 325 mg (65 mg iron) tablet, Disp: , Rfl:     fluticasone propionate (FLONASE) 50 mcg/actuation nasal spray, fluticasone propionate 50 mcg/actuation nasal spray,suspension, Disp: , Rfl:     fluticasone propionate (FLOVENT HFA) 220 mcg/actuation inhaler, Flovent  mcg/actuation aerosol inhaler  Inhale 2 puffs twice a day by inhalation route for 30 days., Disp: , Rfl:     linaCLOtide (LINZESS) 145 mcg Cap capsule, Linzess 145 mcg capsule  Take 1 capsule every day by oral route in the morning for 90 days., Disp: , Rfl:     metFORMIN (GLUCOPHAGE) 500 MG tablet, Take 500 mg by mouth 2 (two) times daily., Disp: , Rfl:     omeprazole (PRILOSEC) 40 MG capsule, Take 40 mg by mouth once daily., Disp: , Rfl:     promethazine-codeine 6.25-10 mg/5 ml (PHENERGAN WITH CODEINE) 6.25-10 mg/5 mL syrup, 5 mLs., Disp: , Rfl:      STIOLTO RESPIMAT 2.5-2.5 mcg/actuation Mist, SMARTSI Puff(s) Via Inhaler Daily, Disp: , Rfl:     torsemide (DEMADEX) 20 MG Tab, Take 1 tablet (20 mg total) by mouth once daily., Disp: 30 tablet, Rfl: 11    XARELTO 20 mg Tab, TAKE ONE TABLET BY MOUTH EVERY DAY, Disp: 30 tablet, Rfl: 11   Review of patient's allergies indicates:   Allergen Reactions    Iodine and iodide containing products Swelling    Shellfish containing products Swelling    Shellfish derived Swelling    Iodinated contrast media Swelling    Pcn [penicillins] Swelling      Social History     Tobacco Use    Smoking status: Never    Smokeless tobacco: Never   Substance Use Topics    Alcohol use: No      Family History   Problem Relation Age of Onset    Breast cancer Other 70    Eclampsia Neg Hx     Diabetes Neg Hx     Colon cancer Neg Hx     Hypertension Neg Hx     Miscarriages / Stillbirths Neg Hx     Ovarian cancer Neg Hx      labor Neg Hx     Stroke Neg Hx         Review of Systems   Integumentary:  Negative for color change, rash, mole/lesion, breast mass, breast discharge and breast tenderness.   Breast: Negative for mass and tenderness       Physical Exam   Constitutional: She is cooperative.   HENT:   Head: Normocephalic.   Pulmonary/Chest: She exhibits no mass. Right breast exhibits no inverted nipple, no mass, no nipple discharge, no skin change and no tenderness. Left breast exhibits no mass, no skin change and no tenderness. No breast swelling.   The left breast is surgically absent with no signs of malignancy or recurrence.    Abdominal: Normal appearance.   Genitourinary: No breast swelling.   Musculoskeletal: Lymphadenopathy:      Upper Body:      Right upper body: No supraclavicular or axillary adenopathy.      Left upper body: No supraclavicular or axillary adenopathy.     Neurological: She is alert.   Skin: No rash noted.        ASSESSMENT and PLAN OF CARE     1. Malignant neoplasm of upper-inner quadrant of left breast in  female, estrogen receptor positive  Assessment & Plan:   Patient is doing well and is being followed according to NCCN Guidelines. She understands that her exams have remained stable and is comfortable being followed in a conservative fashion. She understands the importance of monthly self-breast examination and knows to report any and all changes as they occur.        2. Estrogen receptor positive  Assessment & Plan:  Continue Arimidex      Medical Decision Making:  It is my impression that this patient suffers all conditions contained in this medical document.  Each of these conditions did affect our plan of care and my medical decision making today.  It is my opinion that the medical decision making concerning this patient was of moderate difficulty based on the aforementioned conditions.  Any further recommendations will be communicated to the patient by me.  I have reviewed and verified her allergies, list of medications, medical and surgical histories, social history, and a pertinent review of symptoms.    Follow up:  6 Months and PRN     For:MGM (BERTA) at  and CXR

## 2024-01-08 LAB — CANCER AG27-29 SERPL-ACNC: 14.2 U/ML

## 2024-06-27 DIAGNOSIS — C50.212 MALIGNANT NEOPLASM OF UPPER-INNER QUADRANT OF LEFT BREAST IN FEMALE, ESTROGEN RECEPTOR POSITIVE: Primary | ICD-10-CM

## 2024-06-27 DIAGNOSIS — Z17.0 MALIGNANT NEOPLASM OF UPPER-INNER QUADRANT OF LEFT BREAST IN FEMALE, ESTROGEN RECEPTOR POSITIVE: Primary | ICD-10-CM

## 2024-07-04 NOTE — ASSESSMENT & PLAN NOTE
Patient is doing well and is being followed according to NCCN Guidelines. She understands that her exams have remained stable and is comfortable being followed in a conservative fashion. She understands the importance of monthly self-breast examination and knows to report any and all changes as they occur.      Labs obtained today: CBC, Chem 12, CEA, LDH, CA 27.29 - after resulted, these will be compared to her previous values and all abnormal values will be phoned to her for discussion.

## 2024-07-04 NOTE — PROGRESS NOTES
[facility]  Eric Castellanos MD, FACS  Abe Herndon NP-C      Date of Service: 7/5/2024    Chief Complaint:   Yeny Hargrove is a 64 y.o. female presenting today for her 6 month follow up due to her breast cancer diagnosis.  She is due for her Mammogram and CXR.  She reports no interval changes on her self-breast examination.     History of Present Illness:   Mrs. Hargrove was diagnosed with left breast cancer in December 2007. She elected Lumpectomy and SLN biopsy on 2-5-2008 that showed a 0.6 cm Grade II IDC with extensive and multifocal DCIS with negative SLNs x 2 with negative margins. She completed adjuvant Radiation and completed 5 years of Tamoxifen. Stage I, [id number]. In May 2018, she was diagnosed another left breast cancer (at the age of 58) - DCIS. She elected mastectomy that showed a 2.7 mm Grade II IDC with 11 mm of DCIS. Margins were negative. ER/[place] Positive and HER 2 FISH negative. She has been on Arimidex since July 2018. [facility] Comprehensive Panel Negative (2018) MDs::: Gail Dow MD([place]).       Past Medical History:   Diagnosis Date    Asthma     Breast cancer     Estrogen receptor positive 6/5/2023    Hypertension       Past Surgical History:   Procedure Laterality Date    BREAST LUMPECTOMY      HYSTERECTOMY      MASTECTOMY      TREATMENT OF CARDIAC ARRHYTHMIA N/A 3/16/2022    Procedure: Cardioversion or Defibrillation;  Surgeon: Shanika Benavidez MD;  Location: [facility] CATH LAB/EP;  Service: Cardiology;  Laterality: N/A;        Current Outpatient Medications:     albuterol (PROVENTIL/VENTOLIN HFA) 90 mcg/actuation inhaler, Ventolin HFA 90 mcg/actuation aerosol inhaler  Inhale 2 puffs 4 times a day by inhalation route., Disp: , Rfl:     ALLERGY RELIEF, LORATADINE, 10 mg tablet, Take 10 mg by mouth once daily., Disp: , Rfl:     amitriptyline (ELAVIL) 25 MG tablet, Take 25 mg by mouth nightly as needed for Insomnia., Disp: , Rfl:     amLODIPine (NORVASC)  10 MG tablet, , Disp: , Rfl:     anastrozole (ARIMIDEX) 1 mg Tab, Take 1 tablet (1 mg total) by mouth once daily., Disp: 90 tablet, Rfl: 1    atorvastatin (LIPITOR) 80 MG tablet, Take 1 tablet (80 mg total) by mouth once daily., Disp: 30 tablet, Rfl: 11    baclofen (LIORESAL) 5 mg Tab tablet, baclofen 5 mg tablet, Disp: , Rfl:     benzonatate (TESSALON) 100 MG capsule, Tessalon Perles 100 mg capsule  Take 1 capsule every day by oral route for 30 days., Disp: , Rfl:     diclofenac (VOLTAREN) 50 MG EC tablet, Take 50 mg by mouth 2 (two) times daily., Disp: , Rfl:     ferrous sulfate (FEOSOL) 325 mg (65 mg iron) Tab tablet, FeroSul 325 mg (65 mg iron) tablet, Disp: , Rfl:     fluticasone propionate (FLONASE) 50 mcg/actuation nasal spray, fluticasone propionate 50 mcg/actuation nasal spray,suspension, Disp: , Rfl:     fluticasone propionate (FLOVENT HFA) 220 mcg/actuation inhaler, Flovent  mcg/actuation aerosol inhaler  Inhale 2 puffs twice a day by inhalation route for 30 days., Disp: , Rfl:     linaCLOtide (LINZESS) 145 mcg Cap capsule, Linzess 145 mcg capsule  Take 1 capsule every day by oral route in the morning for 90 days., Disp: , Rfl:     metFORMIN (GLUCOPHAGE) 500 MG tablet, Take 500 mg by mouth 2 (two) times daily., Disp: , Rfl:     omeprazole (PRILOSEC) 40 MG capsule, Take 40 mg by mouth once daily., Disp: , Rfl:     promethazine-codeine 6.25-10 mg/5 ml (PHENERGAN WITH CODEINE) 6.25-10 mg/5 mL syrup, 5 mLs., Disp: , Rfl:     STIOLTO RESPIMAT 2.5-2.5 mcg/actuation Mist, SMARTSI Puff(s) Via Inhaler Daily, Disp: , Rfl:     XARELTO 20 mg Tab, TAKE ONE TABLET BY MOUTH EVERY DAY, Disp: 30 tablet, Rfl: 11    carvediloL (COREG) 6.25 MG tablet, Take 1 tablet (6.25 mg total) by mouth 2 (two) times daily with meals., Disp: 180 tablet, Rfl: 3    torsemide (DEMADEX) 20 MG Tab, Take 1 tablet (20 mg total) by mouth once daily., Disp: 30 tablet, Rfl: 11   Review of patient's allergies indicates:   Allergen  Reactions    Iodine and iodide containing products Swelling    Shellfish containing products Swelling    Shellfish derived Swelling    Iodinated contrast media Swelling    Pcn [penicillins] Swelling      Social History     Tobacco Use    Smoking status: Never    Smokeless tobacco: Never   Substance Use Topics    Alcohol use: No      Family History   Problem Relation Name Age of Onset    Breast cancer Other Maternal Aunt 70    Eclampsia Neg Hx      Diabetes Neg Hx      Colon cancer Neg Hx      Hypertension Neg Hx      Miscarriages / Stillbirths Neg Hx      Ovarian cancer Neg Hx       labor Neg Hx      Stroke Neg Hx          Review of Systems   Integumentary:  Negative for color change, rash, mole/lesion, breast mass, breast discharge and breast tenderness.   Breast: Negative for mass and tenderness       Physical Exam   Constitutional: She is cooperative.   HENT:   Head: Normocephalic.   Pulmonary/Chest: She exhibits no mass. Right breast exhibits no inverted nipple, no mass, no nipple discharge, no skin change and no tenderness. Left breast exhibits no mass, no skin change and no tenderness. No breast swelling.   The left breast is surgically absent with no signs of malignancy or recurrence.    Abdominal: Normal appearance.   Genitourinary: No breast swelling.   Musculoskeletal: Lymphadenopathy:      Upper Body:      Right upper body: No supraclavicular or axillary adenopathy.      Left upper body: No supraclavicular or axillary adenopathy.     Neurological: She is alert.   Skin: No rash noted.          MAMMOGRAM REPORT:FINDINGS: The patient has had a left mastectomy. There are scattered fibroglandular elements noted. There are no suspicious masses or suspicious calcifications seen to suggest malignancy. Benign-type calcifications are identified.  IMPRESSION: No evidence of malignancy. No significant change when compared to previous exam. Follow-up mammography is recommended in one year.    CXR REPORT:  FINDINGS: Cardiac size is upper limits normal. Mild elevation right hemidiaphragm unchanged. Left mastectomy. No acute infiltrate. No pleural effusion. Osseous structures are intact. IMPRESSION: No acute disease.     ASSESSMENT and PLAN OF CARE     1. Malignant neoplasm of upper-inner quadrant of left breast in female, estrogen receptor positive  Assessment & Plan:   Patient is doing well and is being followed according to NCCN Guidelines. She understands that her exams have remained stable and is comfortable being followed in a conservative fashion. She understands the importance of monthly self-breast examination and knows to report any and all changes as they occur.      Labs obtained today: CBC, Chem 12, CEA, LDH, CA 27.29 - after resulted, these will be compared to her previous values and all abnormal values will be phoned to her for discussion.      Orders:  -     Cancer Antigen 27-29  -     Comprehensive Metabolic Panel  -     Lactate Dehydrogenase  -     CEA  -     CBC Auto Differential    2. Estrogen receptor positive  Assessment & Plan:  Continue Arimidex      Medical Decision Making: It is my impression that this patient suffers all conditions contained in this medical document.  Each of these conditions did affect our plan of care and my medical decision making today.  It is my opinion that the medical decision making concerning this patient was of moderate difficulty based on the aforementioned conditions.  Any further recommendations will be communicated to the patient by me.  I have reviewed and verified her allergies, list of medications, medical and surgical histories, social history, and a pertinent review of symptoms.     Follow up:  6 months and prn    For:  Physical Examination

## 2024-07-05 ENCOUNTER — OFFICE VISIT (OUTPATIENT)
Dept: SURGERY | Facility: CLINIC | Age: 64
End: 2024-07-05
Payer: MEDICARE

## 2024-07-05 DIAGNOSIS — Z17.0 ESTROGEN RECEPTOR POSITIVE: ICD-10-CM

## 2024-07-05 DIAGNOSIS — Z17.0 MALIGNANT NEOPLASM OF UPPER-INNER QUADRANT OF LEFT BREAST IN FEMALE, ESTROGEN RECEPTOR POSITIVE: Primary | Chronic | ICD-10-CM

## 2024-07-05 DIAGNOSIS — C50.212 MALIGNANT NEOPLASM OF UPPER-INNER QUADRANT OF LEFT BREAST IN FEMALE, ESTROGEN RECEPTOR POSITIVE: Primary | Chronic | ICD-10-CM

## 2024-07-05 LAB
ALBUMIN SERPL BCP-MCNC: 3.6 G/DL (ref 3.5–5.2)
ALP SERPL-CCNC: 98 U/L (ref 55–135)
ALT SERPL W/O P-5'-P-CCNC: 6 U/L (ref 10–44)
ANION GAP SERPL CALC-SCNC: 10 MMOL/L (ref 8–16)
AST SERPL-CCNC: 12 U/L (ref 10–40)
BASOPHILS # BLD AUTO: 0.05 K/UL (ref 0–0.2)
BASOPHILS NFR BLD: 1 % (ref 0–1.9)
BILIRUB SERPL-MCNC: 0.3 MG/DL (ref 0.1–1)
BUN SERPL-MCNC: 8 MG/DL (ref 8–23)
CALCIUM SERPL-MCNC: 9.2 MG/DL (ref 8.7–10.5)
CEA SERPL-MCNC: 2.5 NG/ML (ref 0–5)
CHLORIDE SERPL-SCNC: 107 MMOL/L (ref 95–110)
CO2 SERPL-SCNC: 25 MMOL/L (ref 23–29)
CREAT SERPL-MCNC: 1 MG/DL (ref 0.5–1.4)
DIFFERENTIAL METHOD BLD: ABNORMAL
EOSINOPHIL # BLD AUTO: 0.1 K/UL (ref 0–0.5)
EOSINOPHIL NFR BLD: 1.4 % (ref 0–8)
ERYTHROCYTE [DISTWIDTH] IN BLOOD BY AUTOMATED COUNT: 16.5 % (ref 11.5–14.5)
EST. GFR  (NO RACE VARIABLE): >60 ML/MIN/1.73 M^2
GLUCOSE SERPL-MCNC: 94 MG/DL (ref 70–110)
HCT VFR BLD AUTO: 41 % (ref 37–48.5)
HGB BLD-MCNC: 12.4 G/DL (ref 12–16)
IMM GRANULOCYTES # BLD AUTO: 0.01 K/UL (ref 0–0.04)
IMM GRANULOCYTES NFR BLD AUTO: 0.2 % (ref 0–0.5)
LDH SERPL L TO P-CCNC: 200 U/L (ref 110–260)
LYMPHOCYTES # BLD AUTO: 1.8 K/UL (ref 1–4.8)
LYMPHOCYTES NFR BLD: 35.4 % (ref 18–48)
MCH RBC QN AUTO: 27.5 PG (ref 27–31)
MCHC RBC AUTO-ENTMCNC: 30.2 G/DL (ref 32–36)
MCV RBC AUTO: 91 FL (ref 82–98)
MONOCYTES # BLD AUTO: 0.5 K/UL (ref 0.3–1)
MONOCYTES NFR BLD: 9.3 % (ref 4–15)
NEUTROPHILS # BLD AUTO: 2.7 K/UL (ref 1.8–7.7)
NEUTROPHILS NFR BLD: 52.7 % (ref 38–73)
NRBC BLD-RTO: 0 /100 WBC
PLATELET # BLD AUTO: 285 K/UL (ref 150–450)
PMV BLD AUTO: 11.2 FL (ref 9.2–12.9)
POTASSIUM SERPL-SCNC: 4.6 MMOL/L (ref 3.5–5.1)
PROT SERPL-MCNC: 7.2 G/DL (ref 6–8.4)
RBC # BLD AUTO: 4.51 M/UL (ref 4–5.4)
SODIUM SERPL-SCNC: 142 MMOL/L (ref 136–145)
WBC # BLD AUTO: 5.06 K/UL (ref 3.9–12.7)

## 2024-07-05 PROCEDURE — 80053 COMPREHEN METABOLIC PANEL: CPT | Performed by: NURSE PRACTITIONER

## 2024-07-05 PROCEDURE — 85025 COMPLETE CBC W/AUTO DIFF WBC: CPT | Performed by: NURSE PRACTITIONER

## 2024-07-05 PROCEDURE — 83615 LACTATE (LD) (LDH) ENZYME: CPT | Performed by: NURSE PRACTITIONER

## 2024-07-05 PROCEDURE — 82378 CARCINOEMBRYONIC ANTIGEN: CPT | Performed by: NURSE PRACTITIONER

## 2024-07-05 PROCEDURE — 86300 IMMUNOASSAY TUMOR CA 15-3: CPT | Performed by: NURSE PRACTITIONER

## 2024-07-05 PROCEDURE — 99999 PR PBB SHADOW E&M-EST. PATIENT-LVL III: CPT | Mod: PBBFAC,,, | Performed by: NURSE PRACTITIONER

## 2024-12-09 ENCOUNTER — TELEPHONE (OUTPATIENT)
Dept: SURGERY | Facility: CLINIC | Age: 64
End: 2024-12-09
Payer: MEDICARE

## 2024-12-11 ENCOUNTER — TELEPHONE (OUTPATIENT)
Dept: SURGERY | Facility: CLINIC | Age: 64
End: 2024-12-11
Payer: MEDICARE

## 2024-12-11 NOTE — TELEPHONE ENCOUNTER
2nd attempt to contact patient regarding 1/10/25 appt w/ Abe Herndon NP. Unable to leave vm at the time of phone call. Vm box was full.

## 2025-01-08 NOTE — PROGRESS NOTES
[facility]  Eric Castellanos MD, FACS  Abe Herndon NP-C      Date of Service: 1/10/2025    Chief Complaint:   Yeny Hargrove is a 64 y.o. female presenting today for her 6 month follow up to her breast cancer diagnosis.  She is due for a Physical Examination.  She reports no interval changes. She would like to do extended Arimidex she stopped only due to losing her insurance.    History of Present Illness:   Mrs. Hargrove was diagnosed with left breast cancer in December 2007. She elected Lumpectomy and SLN biopsy on 2-5-2008 that showed a 0.6 cm Grade II IDC with extensive and multifocal DCIS with negative SLNs x 2 with negative margins. She completed adjuvant Radiation and completed 5 years of Tamoxifen. Stage I, [id number]. In May 2018, she was diagnosed another left breast cancer (at the age of 58) - DCIS. She elected mastectomy that showed a 2.7 mm Grade II IDC with 11 mm of DCIS. Margins were negative. ER/SD Positive and HER 2 FISH negative. She has been on Arimidex since July 2018 until July of 2023. [facility] Comprehensive Panel Negative (2018) MDs::: Gail Dow MD([place]).     Past Medical History:   Diagnosis Date    Asthma     Breast cancer     Estrogen receptor positive 6/5/2023    Hypertension       Past Surgical History:   Procedure Laterality Date    BREAST LUMPECTOMY      HYSTERECTOMY      MASTECTOMY      TREATMENT OF CARDIAC ARRHYTHMIA N/A 3/16/2022    Procedure: Cardioversion or Defibrillation;  Surgeon: Shanika Benavidez MD;  Location: [facility] CATH LAB/EP;  Service: Cardiology;  Laterality: N/A;        Current Outpatient Medications:     albuterol (PROVENTIL/VENTOLIN HFA) 90 mcg/actuation inhaler, Ventolin HFA 90 mcg/actuation aerosol inhaler  Inhale 2 puffs 4 times a day by inhalation route., Disp: , Rfl:     ALLERGY RELIEF, LORATADINE, 10 mg tablet, Take 10 mg by mouth once daily., Disp: , Rfl:     amitriptyline (ELAVIL) 25 MG tablet, Take 25 mg by mouth  nightly as needed for Insomnia., Disp: , Rfl:     amLODIPine (NORVASC) 10 MG tablet, , Disp: , Rfl:     anastrozole (ARIMIDEX) 1 mg Tab, Take 1 tablet (1 mg total) by mouth once daily., Disp: 90 tablet, Rfl: 1    atorvastatin (LIPITOR) 80 MG tablet, Take 1 tablet (80 mg total) by mouth once daily., Disp: 30 tablet, Rfl: 11    baclofen (LIORESAL) 5 mg Tab tablet, baclofen 5 mg tablet, Disp: , Rfl:     benzonatate (TESSALON) 100 MG capsule, Tessalon Perles 100 mg capsule  Take 1 capsule every day by oral route for 30 days., Disp: , Rfl:     carvediloL (COREG) 6.25 MG tablet, Take 1 tablet (6.25 mg total) by mouth 2 (two) times daily with meals., Disp: 180 tablet, Rfl: 3    diclofenac (VOLTAREN) 50 MG EC tablet, Take 50 mg by mouth 2 (two) times daily., Disp: , Rfl:     ferrous sulfate (FEOSOL) 325 mg (65 mg iron) Tab tablet, FeroSul 325 mg (65 mg iron) tablet, Disp: , Rfl:     fluticasone propionate (FLONASE) 50 mcg/actuation nasal spray, fluticasone propionate 50 mcg/actuation nasal spray,suspension, Disp: , Rfl:     fluticasone propionate (FLOVENT HFA) 220 mcg/actuation inhaler, Flovent  mcg/actuation aerosol inhaler  Inhale 2 puffs twice a day by inhalation route for 30 days., Disp: , Rfl:     linaCLOtide (LINZESS) 145 mcg Cap capsule, Linzess 145 mcg capsule  Take 1 capsule every day by oral route in the morning for 90 days., Disp: , Rfl:     metFORMIN (GLUCOPHAGE) 500 MG tablet, Take 500 mg by mouth 2 (two) times daily., Disp: , Rfl:     omeprazole (PRILOSEC) 40 MG capsule, Take 40 mg by mouth once daily., Disp: , Rfl:     promethazine-codeine 6.25-10 mg/5 ml (PHENERGAN WITH CODEINE) 6.25-10 mg/5 mL syrup, 5 mLs., Disp: , Rfl:     STIOLTO RESPIMAT 2.5-2.5 mcg/actuation Mist, SMARTSI Puff(s) Via Inhaler Daily, Disp: , Rfl:     torsemide (DEMADEX) 20 MG Tab, Take 1 tablet (20 mg total) by mouth once daily., Disp: 30 tablet, Rfl: 11    XARELTO 20 mg Tab, TAKE ONE TABLET BY MOUTH EVERY DAY, Disp: 30 tablet,  Rfl: 11   Review of patient's allergies indicates:   Allergen Reactions    Iodine and iodide containing products Swelling    Shellfish containing products Swelling    Shellfish derived Swelling    Iodinated contrast media Swelling    Pcn [penicillins] Swelling      Social History     Tobacco Use    Smoking status: Never    Smokeless tobacco: Never   Substance Use Topics    Alcohol use: No      Family History   Problem Relation Name Age of Onset    Breast cancer Other Maternal Aunt 70    Eclampsia Neg Hx      Diabetes Neg Hx      Colon cancer Neg Hx      Hypertension Neg Hx      Miscarriages / Stillbirths Neg Hx      Ovarian cancer Neg Hx       labor Neg Hx      Stroke Neg Hx          Review of Systems   Integumentary:  Negative for color change, rash, mole/lesion, breast mass, breast discharge and breast tenderness.   Breast: Negative for mass and tenderness       Physical Exam   Constitutional: She is cooperative.   HENT:   Head: Normocephalic.   Pulmonary/Chest: She exhibits no mass. Right breast exhibits no inverted nipple, no mass, no nipple discharge, no skin change and no tenderness. Left breast exhibits no mass, no skin change and no tenderness. No breast swelling.   The left breast is surgically absent with no signs of malignancy or recurrence.    Abdominal: Normal appearance.   Genitourinary: No breast swelling.   Musculoskeletal: Lymphadenopathy:      Upper Body:      Right upper body: No supraclavicular or axillary adenopathy.      Left upper body: No supraclavicular or axillary adenopathy.     Neurological: She is alert.   Skin: No rash noted.          ASSESSMENT and PLAN OF CARE     1. Malignant neoplasm of upper-inner quadrant of left breast in female, estrogen receptor positive  Assessment & Plan:   Patient is doing well and is being followed according to NCCN Guidelines. She understands that her exams have remained stable and is comfortable being followed in a conservative fashion. She  understands the importance of monthly self-breast examination and knows to report any and all changes as they occur.      Labs obtained today: CBC, Chem 12, CEA, LDH, CA 27.29 - after resulted, these will be compared to her previous values and all abnormal values will be phoned to her for discussion.      Orders:  -     Cancer Antigen 27-29; Future; Expected date: 01/10/2025  -     Comprehensive Metabolic Panel; Future; Expected date: 01/10/2025  -     Lactate Dehydrogenase; Future; Expected date: 01/10/2025  -     CEA; Future; Expected date: 01/10/2025  -     CBC Auto Differential; Future; Expected date: 01/10/2025  -     anastrozole (ARIMIDEX) 1 mg Tab; Take 1 tablet (1 mg total) by mouth once daily.  Dispense: 90 tablet; Refill: 1    2. Estrogen receptor positive  Assessment & Plan:  Continue Arimidex      Medical Decision Making:  It is my impression that this patient suffers all conditions contained in this medical document.  Each of these conditions did affect our plan of care and my medical decision making today.  It is my opinion that the medical decision making concerning this patient was of moderate difficulty based on the aforementioned conditions.  Any further recommendations will be communicated to the patient by me.  I have reviewed and verified her allergies, list of medications, medical and surgical histories, social history, and a pertinent review of symptoms.    Follow up:  6 Months and PRN     For: Rt. JESU(BERTA), CXR @  and labs

## 2025-01-10 ENCOUNTER — OFFICE VISIT (OUTPATIENT)
Dept: SURGERY | Facility: CLINIC | Age: 65
End: 2025-01-10
Payer: MEDICARE

## 2025-01-10 ENCOUNTER — LAB VISIT (OUTPATIENT)
Dept: LAB | Facility: HOSPITAL | Age: 65
End: 2025-01-10
Attending: OTOLARYNGOLOGY
Payer: MEDICARE

## 2025-01-10 DIAGNOSIS — Z17.0 ESTROGEN RECEPTOR POSITIVE: ICD-10-CM

## 2025-01-10 DIAGNOSIS — C50.212 MALIGNANT NEOPLASM OF UPPER-INNER QUADRANT OF LEFT BREAST IN FEMALE, ESTROGEN RECEPTOR POSITIVE: Chronic | ICD-10-CM

## 2025-01-10 DIAGNOSIS — Z17.0 MALIGNANT NEOPLASM OF UPPER-INNER QUADRANT OF LEFT BREAST IN FEMALE, ESTROGEN RECEPTOR POSITIVE: Chronic | ICD-10-CM

## 2025-01-10 DIAGNOSIS — Z17.0 MALIGNANT NEOPLASM OF UPPER-INNER QUADRANT OF LEFT BREAST IN FEMALE, ESTROGEN RECEPTOR POSITIVE: Primary | Chronic | ICD-10-CM

## 2025-01-10 DIAGNOSIS — C50.212 MALIGNANT NEOPLASM OF UPPER-INNER QUADRANT OF LEFT BREAST IN FEMALE, ESTROGEN RECEPTOR POSITIVE: Primary | Chronic | ICD-10-CM

## 2025-01-10 LAB
ALBUMIN SERPL BCP-MCNC: 3.5 G/DL (ref 3.5–5.2)
ALP SERPL-CCNC: 91 U/L (ref 40–150)
ALT SERPL W/O P-5'-P-CCNC: 10 U/L (ref 10–44)
ANION GAP SERPL CALC-SCNC: 12 MMOL/L (ref 8–16)
AST SERPL-CCNC: 12 U/L (ref 10–40)
BASOPHILS # BLD AUTO: 0.07 K/UL (ref 0–0.2)
BASOPHILS NFR BLD: 1.4 % (ref 0–1.9)
BILIRUB SERPL-MCNC: 0.2 MG/DL (ref 0.1–1)
BUN SERPL-MCNC: 10 MG/DL (ref 8–23)
CALCIUM SERPL-MCNC: 8.9 MG/DL (ref 8.7–10.5)
CEA SERPL-MCNC: 3.5 NG/ML (ref 0–5)
CHLORIDE SERPL-SCNC: 106 MMOL/L (ref 95–110)
CO2 SERPL-SCNC: 25 MMOL/L (ref 23–29)
CREAT SERPL-MCNC: 0.9 MG/DL (ref 0.5–1.4)
DIFFERENTIAL METHOD BLD: ABNORMAL
EOSINOPHIL # BLD AUTO: 0.1 K/UL (ref 0–0.5)
EOSINOPHIL NFR BLD: 1.4 % (ref 0–8)
ERYTHROCYTE [DISTWIDTH] IN BLOOD BY AUTOMATED COUNT: 16.3 % (ref 11.5–14.5)
EST. GFR  (NO RACE VARIABLE): >60 ML/MIN/1.73 M^2
GLUCOSE SERPL-MCNC: 82 MG/DL (ref 70–110)
HCT VFR BLD AUTO: 40.7 % (ref 37–48.5)
HGB BLD-MCNC: 12.6 G/DL (ref 12–16)
IMM GRANULOCYTES # BLD AUTO: 0.01 K/UL (ref 0–0.04)
IMM GRANULOCYTES NFR BLD AUTO: 0.2 % (ref 0–0.5)
LDH SERPL L TO P-CCNC: 200 U/L (ref 110–260)
LYMPHOCYTES # BLD AUTO: 2.7 K/UL (ref 1–4.8)
LYMPHOCYTES NFR BLD: 54.1 % (ref 18–48)
MCH RBC QN AUTO: 27.6 PG (ref 27–31)
MCHC RBC AUTO-ENTMCNC: 31 G/DL (ref 32–36)
MCV RBC AUTO: 89 FL (ref 82–98)
MONOCYTES # BLD AUTO: 0.5 K/UL (ref 0.3–1)
MONOCYTES NFR BLD: 10.6 % (ref 4–15)
NEUTROPHILS # BLD AUTO: 1.6 K/UL (ref 1.8–7.7)
NEUTROPHILS NFR BLD: 32.3 % (ref 38–73)
NRBC BLD-RTO: 0 /100 WBC
PLATELET # BLD AUTO: 268 K/UL (ref 150–450)
PMV BLD AUTO: 11 FL (ref 9.2–12.9)
POTASSIUM SERPL-SCNC: 4.7 MMOL/L (ref 3.5–5.1)
PROT SERPL-MCNC: 7.3 G/DL (ref 6–8.4)
RBC # BLD AUTO: 4.56 M/UL (ref 4–5.4)
SODIUM SERPL-SCNC: 143 MMOL/L (ref 136–145)
WBC # BLD AUTO: 4.9 K/UL (ref 3.9–12.7)

## 2025-01-10 PROCEDURE — 82378 CARCINOEMBRYONIC ANTIGEN: CPT | Performed by: NURSE PRACTITIONER

## 2025-01-10 PROCEDURE — 99999 PR PBB SHADOW E&M-EST. PATIENT-LVL III: CPT | Mod: PBBFAC,,, | Performed by: NURSE PRACTITIONER

## 2025-01-10 PROCEDURE — 86300 IMMUNOASSAY TUMOR CA 15-3: CPT | Performed by: NURSE PRACTITIONER

## 2025-01-10 PROCEDURE — 36415 COLL VENOUS BLD VENIPUNCTURE: CPT | Performed by: NURSE PRACTITIONER

## 2025-01-10 PROCEDURE — 83615 LACTATE (LD) (LDH) ENZYME: CPT | Performed by: NURSE PRACTITIONER

## 2025-01-10 PROCEDURE — 85025 COMPLETE CBC W/AUTO DIFF WBC: CPT | Performed by: NURSE PRACTITIONER

## 2025-01-10 PROCEDURE — 80053 COMPREHEN METABOLIC PANEL: CPT | Performed by: NURSE PRACTITIONER

## 2025-01-10 RX ORDER — ANASTROZOLE 1 MG/1
1 TABLET ORAL DAILY
Qty: 90 TABLET | Refills: 1 | Status: SHIPPED | OUTPATIENT
Start: 2025-01-10

## 2025-01-13 LAB — CANCER AG27-29 SERPL-ACNC: <12 U/ML

## 2025-01-16 ENCOUNTER — TELEPHONE (OUTPATIENT)
Dept: PHARMACY | Facility: CLINIC | Age: 65
End: 2025-01-16
Payer: MEDICARE

## 2025-01-16 ENCOUNTER — LAB VISIT (OUTPATIENT)
Dept: LAB | Facility: HOSPITAL | Age: 65
End: 2025-01-16
Attending: FAMILY MEDICINE
Payer: MEDICARE

## 2025-01-16 ENCOUNTER — OFFICE VISIT (OUTPATIENT)
Dept: FAMILY MEDICINE | Facility: CLINIC | Age: 65
End: 2025-01-16
Payer: MEDICARE

## 2025-01-16 ENCOUNTER — TELEPHONE (OUTPATIENT)
Dept: FAMILY MEDICINE | Facility: CLINIC | Age: 65
End: 2025-01-16

## 2025-01-16 VITALS
SYSTOLIC BLOOD PRESSURE: 128 MMHG | DIASTOLIC BLOOD PRESSURE: 78 MMHG | OXYGEN SATURATION: 97 % | HEART RATE: 100 BPM | BODY MASS INDEX: 40.46 KG/M2 | HEIGHT: 64 IN | WEIGHT: 237 LBS

## 2025-01-16 DIAGNOSIS — Z11.59 NEED FOR HEPATITIS C SCREENING TEST: ICD-10-CM

## 2025-01-16 DIAGNOSIS — M79.10 MYALGIA: ICD-10-CM

## 2025-01-16 DIAGNOSIS — E78.49 OTHER HYPERLIPIDEMIA: ICD-10-CM

## 2025-01-16 DIAGNOSIS — I15.2 HYPERTENSION ASSOCIATED WITH DIABETES: ICD-10-CM

## 2025-01-16 DIAGNOSIS — G89.29 CHRONIC BILATERAL LOW BACK PAIN WITHOUT SCIATICA: ICD-10-CM

## 2025-01-16 DIAGNOSIS — E11.59 HYPERTENSION ASSOCIATED WITH DIABETES: ICD-10-CM

## 2025-01-16 DIAGNOSIS — E61.1 IRON DEFICIENCY: ICD-10-CM

## 2025-01-16 DIAGNOSIS — K21.9 GERD WITHOUT ESOPHAGITIS: ICD-10-CM

## 2025-01-16 DIAGNOSIS — Z79.899 LONG-TERM CURRENT USE OF PROTON PUMP INHIBITOR THERAPY: ICD-10-CM

## 2025-01-16 DIAGNOSIS — J45.901 EXACERBATION OF ASTHMA, UNSPECIFIED ASTHMA SEVERITY, UNSPECIFIED WHETHER PERSISTENT: ICD-10-CM

## 2025-01-16 DIAGNOSIS — Z87.09 HISTORY OF ASTHMA: ICD-10-CM

## 2025-01-16 DIAGNOSIS — R06.02 SOB (SHORTNESS OF BREATH): ICD-10-CM

## 2025-01-16 DIAGNOSIS — Z76.89 ENCOUNTER TO ESTABLISH CARE WITH NEW DOCTOR: Primary | ICD-10-CM

## 2025-01-16 DIAGNOSIS — I48.0 PAROXYSMAL ATRIAL FIBRILLATION: ICD-10-CM

## 2025-01-16 DIAGNOSIS — J45.40 MODERATE PERSISTENT ASTHMA, UNSPECIFIED WHETHER COMPLICATED: ICD-10-CM

## 2025-01-16 DIAGNOSIS — E11.9 TYPE 2 DIABETES MELLITUS WITHOUT COMPLICATION, WITHOUT LONG-TERM CURRENT USE OF INSULIN: ICD-10-CM

## 2025-01-16 DIAGNOSIS — I10 ESSENTIAL HYPERTENSION: ICD-10-CM

## 2025-01-16 DIAGNOSIS — M54.50 CHRONIC BILATERAL LOW BACK PAIN WITHOUT SCIATICA: ICD-10-CM

## 2025-01-16 PROBLEM — C50.912: Status: ACTIVE | Noted: 2025-01-16

## 2025-01-16 PROBLEM — D05.12 DUCTAL CARCINOMA IN SITU (DCIS) OF LEFT BREAST: Status: ACTIVE | Noted: 2018-05-24

## 2025-01-16 LAB
25(OH)D3+25(OH)D2 SERPL-MCNC: 27 NG/ML (ref 30–96)
ALBUMIN SERPL BCP-MCNC: 3.7 G/DL (ref 3.5–5.2)
ALP SERPL-CCNC: 99 U/L (ref 40–150)
ALT SERPL W/O P-5'-P-CCNC: 9 U/L (ref 10–44)
ANION GAP SERPL CALC-SCNC: 12 MMOL/L (ref 8–16)
AST SERPL-CCNC: 12 U/L (ref 10–40)
BASOPHILS # BLD AUTO: 0.05 K/UL (ref 0–0.2)
BASOPHILS NFR BLD: 0.7 % (ref 0–1.9)
BILIRUB SERPL-MCNC: 0.2 MG/DL (ref 0.1–1)
BNP SERPL-MCNC: 39 PG/ML (ref 0–99)
BUN SERPL-MCNC: 7 MG/DL (ref 8–23)
CALCIUM SERPL-MCNC: 8.9 MG/DL (ref 8.7–10.5)
CHLORIDE SERPL-SCNC: 104 MMOL/L (ref 95–110)
CHOLEST SERPL-MCNC: 233 MG/DL (ref 120–199)
CHOLEST/HDLC SERPL: 3.1 {RATIO} (ref 2–5)
CK SERPL-CCNC: 52 U/L (ref 20–180)
CO2 SERPL-SCNC: 26 MMOL/L (ref 23–29)
CREAT SERPL-MCNC: 0.9 MG/DL (ref 0.5–1.4)
DIFFERENTIAL METHOD BLD: ABNORMAL
EOSINOPHIL # BLD AUTO: 0.1 K/UL (ref 0–0.5)
EOSINOPHIL NFR BLD: 1.6 % (ref 0–8)
ERYTHROCYTE [DISTWIDTH] IN BLOOD BY AUTOMATED COUNT: 16.6 % (ref 11.5–14.5)
EST. GFR  (NO RACE VARIABLE): >60 ML/MIN/1.73 M^2
ESTIMATED AVG GLUCOSE: 146 MG/DL (ref 68–131)
FERRITIN SERPL-MCNC: 46 NG/ML (ref 20–300)
GLUCOSE SERPL-MCNC: 101 MG/DL (ref 70–110)
HBA1C MFR BLD: 6.7 % (ref 4–5.6)
HCT VFR BLD AUTO: 41.4 % (ref 37–48.5)
HCV AB SERPL QL IA: NORMAL
HDLC SERPL-MCNC: 74 MG/DL (ref 40–75)
HDLC SERPL: 31.8 % (ref 20–50)
HGB BLD-MCNC: 12.7 G/DL (ref 12–16)
IMM GRANULOCYTES # BLD AUTO: 0.01 K/UL (ref 0–0.04)
IMM GRANULOCYTES NFR BLD AUTO: 0.1 % (ref 0–0.5)
IRON SERPL-MCNC: 42 UG/DL (ref 30–160)
LDLC SERPL CALC-MCNC: 144.8 MG/DL (ref 63–159)
LYMPHOCYTES # BLD AUTO: 3 K/UL (ref 1–4.8)
LYMPHOCYTES NFR BLD: 43 % (ref 18–48)
MCH RBC QN AUTO: 27.5 PG (ref 27–31)
MCHC RBC AUTO-ENTMCNC: 30.7 G/DL (ref 32–36)
MCV RBC AUTO: 90 FL (ref 82–98)
MONOCYTES # BLD AUTO: 0.6 K/UL (ref 0.3–1)
MONOCYTES NFR BLD: 8.3 % (ref 4–15)
NEUTROPHILS # BLD AUTO: 3.3 K/UL (ref 1.8–7.7)
NEUTROPHILS NFR BLD: 46.3 % (ref 38–73)
NONHDLC SERPL-MCNC: 159 MG/DL
NRBC BLD-RTO: 0 /100 WBC
PLATELET # BLD AUTO: 267 K/UL (ref 150–450)
PMV BLD AUTO: 10.3 FL (ref 9.2–12.9)
POTASSIUM SERPL-SCNC: 4.3 MMOL/L (ref 3.5–5.1)
PROT SERPL-MCNC: 7.7 G/DL (ref 6–8.4)
RBC # BLD AUTO: 4.61 M/UL (ref 4–5.4)
SATURATED IRON: 12 % (ref 20–50)
SODIUM SERPL-SCNC: 142 MMOL/L (ref 136–145)
TOTAL IRON BINDING CAPACITY: 354 UG/DL (ref 250–450)
TRANSFERRIN SERPL-MCNC: 239 MG/DL (ref 200–375)
TRIGL SERPL-MCNC: 71 MG/DL (ref 30–150)
TSH SERPL DL<=0.005 MIU/L-ACNC: 1.81 UIU/ML (ref 0.4–4)
VIT B12 SERPL-MCNC: 348 PG/ML (ref 210–950)
WBC # BLD AUTO: 7.07 K/UL (ref 3.9–12.7)

## 2025-01-16 PROCEDURE — 3078F DIAST BP <80 MM HG: CPT | Mod: CPTII,S$GLB,, | Performed by: FAMILY MEDICINE

## 2025-01-16 PROCEDURE — 86803 HEPATITIS C AB TEST: CPT | Performed by: FAMILY MEDICINE

## 2025-01-16 PROCEDURE — 82550 ASSAY OF CK (CPK): CPT | Performed by: FAMILY MEDICINE

## 2025-01-16 PROCEDURE — 83880 ASSAY OF NATRIURETIC PEPTIDE: CPT | Performed by: FAMILY MEDICINE

## 2025-01-16 PROCEDURE — 3074F SYST BP LT 130 MM HG: CPT | Mod: CPTII,S$GLB,, | Performed by: FAMILY MEDICINE

## 2025-01-16 PROCEDURE — 84443 ASSAY THYROID STIM HORMONE: CPT | Performed by: FAMILY MEDICINE

## 2025-01-16 PROCEDURE — 82728 ASSAY OF FERRITIN: CPT | Performed by: FAMILY MEDICINE

## 2025-01-16 PROCEDURE — 36415 COLL VENOUS BLD VENIPUNCTURE: CPT | Mod: PO | Performed by: FAMILY MEDICINE

## 2025-01-16 PROCEDURE — 99999 PR PBB SHADOW E&M-EST. PATIENT-LVL V: CPT | Mod: PBBFAC,,, | Performed by: FAMILY MEDICINE

## 2025-01-16 PROCEDURE — 1159F MED LIST DOCD IN RCRD: CPT | Mod: CPTII,S$GLB,, | Performed by: FAMILY MEDICINE

## 2025-01-16 PROCEDURE — 80053 COMPREHEN METABOLIC PANEL: CPT | Performed by: FAMILY MEDICINE

## 2025-01-16 PROCEDURE — 82607 VITAMIN B-12: CPT | Performed by: FAMILY MEDICINE

## 2025-01-16 PROCEDURE — 99215 OFFICE O/P EST HI 40 MIN: CPT | Mod: S$GLB,,, | Performed by: FAMILY MEDICINE

## 2025-01-16 PROCEDURE — 83036 HEMOGLOBIN GLYCOSYLATED A1C: CPT | Performed by: FAMILY MEDICINE

## 2025-01-16 PROCEDURE — 85025 COMPLETE CBC W/AUTO DIFF WBC: CPT | Performed by: FAMILY MEDICINE

## 2025-01-16 PROCEDURE — 80061 LIPID PANEL: CPT | Performed by: FAMILY MEDICINE

## 2025-01-16 PROCEDURE — 82306 VITAMIN D 25 HYDROXY: CPT | Performed by: FAMILY MEDICINE

## 2025-01-16 PROCEDURE — 83540 ASSAY OF IRON: CPT | Performed by: FAMILY MEDICINE

## 2025-01-16 PROCEDURE — 3008F BODY MASS INDEX DOCD: CPT | Mod: CPTII,S$GLB,, | Performed by: FAMILY MEDICINE

## 2025-01-16 RX ORDER — FLUTICASONE FUROATE 100 UG/1
1 POWDER RESPIRATORY (INHALATION) DAILY
Qty: 30 EACH | Refills: 11 | Status: SHIPPED | OUTPATIENT
Start: 2025-01-16

## 2025-01-16 RX ORDER — FUROSEMIDE 20 MG/1
TABLET ORAL
COMMUNITY
End: 2025-01-16

## 2025-01-16 RX ORDER — PREDNISONE 50 MG/1
50 TABLET ORAL DAILY
Qty: 5 TABLET | Refills: 0 | Status: SHIPPED | OUTPATIENT
Start: 2025-01-16 | End: 2025-01-21

## 2025-01-16 RX ORDER — ATORVASTATIN CALCIUM 80 MG/1
80 TABLET, FILM COATED ORAL DAILY
Qty: 90 TABLET | Refills: 1 | Status: SHIPPED | OUTPATIENT
Start: 2025-01-16

## 2025-01-16 RX ORDER — AMLODIPINE BESYLATE 10 MG/1
10 TABLET ORAL DAILY
Qty: 90 TABLET | Refills: 0 | Status: SHIPPED | OUTPATIENT
Start: 2025-01-16

## 2025-01-16 RX ORDER — METFORMIN HYDROCHLORIDE 500 MG/1
500 TABLET, EXTENDED RELEASE ORAL
Qty: 90 TABLET | Refills: 3 | Status: SHIPPED | OUTPATIENT
Start: 2025-01-16 | End: 2026-01-16

## 2025-01-16 RX ORDER — METOPROLOL SUCCINATE 50 MG/1
50 TABLET, EXTENDED RELEASE ORAL DAILY
Qty: 90 TABLET | Refills: 1 | Status: SHIPPED | OUTPATIENT
Start: 2025-01-16

## 2025-01-16 RX ORDER — METOPROLOL SUCCINATE 50 MG/1
50 TABLET, EXTENDED RELEASE ORAL
COMMUNITY
Start: 2025-01-02 | End: 2025-01-16 | Stop reason: SDUPTHER

## 2025-01-16 RX ORDER — IPRATROPIUM BROMIDE AND ALBUTEROL SULFATE 2.5; .5 MG/3ML; MG/3ML
3 SOLUTION RESPIRATORY (INHALATION) EVERY 6 HOURS PRN
Qty: 75 ML | Refills: 2 | Status: SHIPPED | OUTPATIENT
Start: 2025-01-16 | End: 2026-01-16

## 2025-01-16 NOTE — PROGRESS NOTES
"Subjective:       Patient ID: Yeny Hargrove is a 64 y.o. female.    Chief Complaint: Establish Care      Yeny Hargrove is a 64 y.o. 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 Eats "whatever they have" and white bread.   Exercise: on her feet at the restaurant and with PT    Labs: ordered     Colon Cancer Screening:       Breast cancer: has breast cancer twice, once 2008 (lumpectomy, radiation, and chemotherapy pill) and once in 2018 (mastectomy, left). Saw the NP breast doctor on Friday, refilled anastrazole.   Mammogram: next one in July.   Pap: 8/16/2018    Asthma: has not seen doctor in awhile, currently taking fluticasone she is getting from a friend. Used to have inhalers with red pump and grey pump btu was too expnsive. Has breathing treatment at home. Endorses increased shortness of breath in the past few weeks, she feels like she is going to pass out and has to lean on something for a few minutes before she can keep going. Uses albuterol nebulizer and friends flovent inhaler. On exam, diminished air exchange without any wheezing, worse at the bases.     Back pain: started years ago, had xrays done and had "deteriorations". Currently seeing physical therapy because her insurance will not cover an MRI without first trynig PT. Cannot pick items up off the floor and feels it is impairing her ability to go places. Had gotten pregabalin in the past and it helped, but it caused constipation. Still has some left and takes it every once in awhile when the pain is bad. Also has tylenol with codeine and has not helped. She needs to fail PT before getting an mri. Xr and PT are external to [facility].     GERD: takes OTC medications as needed.   HLD: takes atorvastatin.   DM: was on metformin. Currently takes it as needed when she eats sugary food, about once a week. No A1c on file for >1 year.   HTN: on amlodipine and metoprolol. Last saw cardiology " "last year. BP stble.   Body aches: diffuse body aches, had gotten workup for rheumatoid arthritis last year and was negative. Likely due to Arimidex?   PAF: off anticoagulation. Due to cost. On metoprolol.   Constipation: currently on no medications, took a pill in the past and it did not work. Does not recall name of the pill.     Feels like she is having abdominal swelling. Reports she had US done and was told it was "nothing." She will send me records.     Also has skin lesion. Been present and not growing for "some time."    Here with Mrs. Vega. My patient.         Review of Systems   Constitutional:  Negative for chills and fever.   HENT:  Negative for hearing loss and trouble swallowing.    Eyes:  Negative for visual disturbance.   Respiratory:  Positive for chest tightness and shortness of breath.    Cardiovascular:  Positive for chest pain.   Gastrointestinal:  Negative for diarrhea and vomiting.   Genitourinary:  Negative for flank pain, frequency and hematuria.   Musculoskeletal:  Positive for back pain and myalgias.   Neurological:  Negative for dizziness, seizures and headaches.         Health Maintenance Due   Topic Date Due    Hepatitis C Screening  Never done    Diabetes Urine Screening  Never done    Foot Exam  Never done    Diabetic Eye Exam  Never done    HIV Screening  Never done    Hemoglobin A1c  09/15/2022    Lipid Panel  03/15/2023    COVID-19 Vaccine (4 - 2024-25 season) 09/01/2024     Immunization History   Administered Date(s) Administered    COVID-19, MRNA, LN-S, PF (Pfizer) (Purple Cap) 08/06/2021, 08/27/2021, 12/15/2021         Objective:     Vitals:    01/16/25 1008   BP: 128/78   BP Location: Left arm   Patient Position: Sitting   Pulse: 100   SpO2: 97%   Weight: 107.5 kg (236 lb 15.9 oz)   Height: 5' 4" (1.626 m)        Physical Exam  Vitals and nursing note reviewed.   Constitutional:       General: She is not in acute distress.     Appearance: She is obese. She is not ill-appearing, " toxic-appearing or diaphoretic.   Cardiovascular:      Rate and Rhythm: Rhythm irregularly irregular.      Heart sounds: No murmur heard.     Comments: HR appears irregularly irregular. Normal rate  Pulmonary:      Effort: Pulmonary effort is normal.      Breath sounds: Normal breath sounds.   Abdominal:      General: There is no distension.      Palpations: Abdomen is soft. There is mass.      Tenderness: There is no abdominal tenderness. There is no guarding or rebound.          Comments: Area of mild swelling. Unclear if this is lipoma vs area of localized fat vs hernia that I can not feel. No symptoms. Reports US has been normal.    Musculoskeletal:         General: No swelling.   Skin:     Comments: Skin lesion   Neurological:      General: No focal deficit present.      Mental Status: She is alert.   Psychiatric:         Mood and Affect: Mood normal.         Behavior: Behavior normal.         Thought Content: Thought content normal.         Judgment: Judgment normal.             Assessment:       1. Encounter to establish care with new doctor    2. Essential hypertension    3. Other hyperlipidemia    4. GERD without esophagitis    5. Type 2 diabetes mellitus without complication, without long-term current use of insulin    6. History of asthma    7. Hypertension associated with diabetes    8. Paroxysmal atrial fibrillation    9. SOB (shortness of breath)    10. Need for hepatitis C screening test    11. Long-term current use of proton pump inhibitor therapy    12. Iron deficiency    13. Moderate persistent asthma, unspecified whether complicated    14. Exacerbation of asthma, unspecified asthma severity, unspecified whether persistent    15. Myalgia    16. Chronic bilateral low back pain without sciatica        Plan:       DM: advised to take metformin at least once daily. A1c today  HTN: continue amlodipine and metoprolol  DLD: continue atorvastatin  PAF: refer to EP, EKG, and ECHO to be scheduled.  Anticoagulation discussion to specialist, not able to afford DOAC.  Back pain: per external PT and physician    Aches due to arimidix?    Continue flovent from friend as much as possible  Will try to get inhaler covered  Diminished air exchange  Will send steroids x 5 days    Continue OTC PPI.     F/u 1 week with Ronit to discuss labs.       Encounter to establish care with new doctor    Essential hypertension  -     Hypertension Digital Medicine (HDMP) Enrollment Order  -     amLODIPine (NORVASC) 10 MG tablet; Take 1 tablet (10 mg total) by mouth once daily.  Dispense: 90 tablet; Refill: 0  -     metoprolol succinate (TOPROL-XL) 50 MG 24 hr tablet; Take 1 tablet (50 mg total) by mouth once daily.  Dispense: 90 tablet; Refill: 1    Other hyperlipidemia  -     atorvastatin (LIPITOR) 80 MG tablet; Take 1 tablet (80 mg total) by mouth once daily.  Dispense: 90 tablet; Refill: 1  -     Lipid Panel; Future; Expected date: 01/16/2025  -     TSH; Future; Expected date: 01/16/2025    GERD without esophagitis  -     Vitamin B12; Future; Expected date: 01/16/2025  -     Vitamin D; Future; Expected date: 01/16/2025    Type 2 diabetes mellitus without complication, without long-term current use of insulin  -     Diabetes Digital Medicine (DDMP) Enrollment Order  -     metFORMIN (GLUCOPHAGE-XR) 500 MG ER 24hr tablet; Take 1 tablet (500 mg total) by mouth daily with breakfast.  Dispense: 90 tablet; Refill: 3  -     Hemoglobin A1C; Future; Expected date: 01/16/2025  -     Microalbumin/Creatinine Ratio, Urine; Future; Expected date: 01/16/2025  -     Ambulatory referral/consult to Optometry; Future; Expected date: 01/23/2025    History of asthma    Hypertension associated with diabetes    Paroxysmal atrial fibrillation  -     Ambulatory referral/consult to Cardiac Electrophysiology; Future; Expected date: 01/23/2025  -     metoprolol succinate (TOPROL-XL) 50 MG 24 hr tablet; Take 1 tablet (50 mg total) by mouth once daily.   Dispense: 90 tablet; Refill: 1    SOB (shortness of breath)  -     Echo; Future  -     EKG 12-lead; Future  -     B-TYPE NATRIURETIC PEPTIDE; Future; Expected date: 01/16/2025    Need for hepatitis C screening test  -     HEPATITIS C ANTIBODY; Future; Expected date: 01/16/2025    Long-term current use of proton pump inhibitor therapy  -     CBC Auto Differential; Future; Expected date: 01/16/2025  -     Iron and TIBC; Future; Expected date: 01/16/2025  -     Ferritin; Future; Expected date: 01/16/2025  -     Comprehensive Metabolic Panel; Future; Expected date: 01/16/2025  -     Vitamin B12; Future; Expected date: 01/16/2025  -     Vitamin D; Future; Expected date: 01/16/2025    Iron deficiency  -     Iron and TIBC; Future; Expected date: 01/16/2025  -     Ferritin; Future; Expected date: 01/16/2025    Moderate persistent asthma, unspecified whether complicated  -     fluticasone furoate (ARNUITY ELLIPTA) 100 mcg/actuation inhaler; Inhale 1 puff into the lungs once daily. Controller  Dispense: 30 each; Refill: 11  -     Ambulatory referral/consult to Pharmacy Assistance; Future; Expected date: 01/23/2025    Exacerbation of asthma, unspecified asthma severity, unspecified whether persistent  -     predniSONE (DELTASONE) 50 MG Tab; Take 1 tablet (50 mg total) by mouth once daily. for 5 days  Dispense: 5 tablet; Refill: 0  -     albuterol-ipratropium (DUO-NEB) 2.5 mg-0.5 mg/3 mL nebulizer solution; Take 3 mLs by nebulization every 6 (six) hours as needed for Wheezing. Rescue  Dispense: 75 mL; Refill: 2    Myalgia  -     CK; Future; Expected date: 01/16/2025    Chronic bilateral low back pain without sciatica

## 2025-01-16 NOTE — PATIENT INSTRUCTIONS
DM: advised to take metformin at least once daily. A1c today  HTN: continue amlodipine and metoprolol  DLD: continue atorvastatin  PAF: refer to EP, EKG, and ECHO to be scheduled. Anticoagulation discussion to specialist, not able to afford DOAC.  Back pain: per external PT and physician    Aches due to arimidix?    Continue flovent from friend as much as possible  Will try to get inhaler covered  Diminished air exchange  Will send steroids x 5 days    Continue OTC PPI.     F/u 1 week with Ronit to discuss labs.

## 2025-01-16 NOTE — TELEPHONE ENCOUNTER
----- Message from Ellen Altman sent at 1/16/2025  3:22 PM CST -----  Regarding: RE: Order for QUINCY SAMPSON      I am reaching out on behalf of [facility] Pharmacy Patient Assistance Team after receiving a referral from your Provider inquiring about assistance with your Arnuity. Unfortunately, The Pharmacy Patient Assistance Team is unable to submit the application at this time due to the following reasons      [facility] Patient Assistance Program guidelines state patient must demonstrate she has spent a minimum $600  on prescriptions for the current year.  Patient would have to provide a printout showing the $600 have been spent to the assigned advocate, Viji Min for application submission.     Viji Min  Pharmacy Patient Assistance Team  ----- Message -----  From: Royal Iniguez DO  Sent: 1/16/2025  11:20 AM CST  To: Pharmacy Patient Assistance Team  Subject: Order for QUINCY SAMPSON

## 2025-01-16 NOTE — TELEPHONE ENCOUNTER
[facility] Patient Assistance Program       I am reaching out on behalf of [facility]s Pharmacy Patient Assistance Team after receiving a referral from your Provider inquiring about assistance with your Arnuity. Unfortunately, The Pharmacy Patient Assistance Team is unable to submit the application at this time due to the following reasons       [facility] Patient Assistance Program guidelines state patient must demonstrate she has spent a minimum $600  on prescriptions for the current year.  Patient would have to provide a printout showing the $600 have been spent to the assigned advocate, Viji Min for application submission.      Sincerely  Viji LEIVA @[phone]  Pharmacy Patient Assistance  [address]  [place] [address]  Fax: [phone]  [email or web address]

## 2025-01-17 ENCOUNTER — TELEPHONE (OUTPATIENT)
Dept: FAMILY MEDICINE | Facility: CLINIC | Age: 65
End: 2025-01-17
Payer: MEDICARE

## 2025-01-17 DIAGNOSIS — E11.9 TYPE 2 DIABETES MELLITUS WITHOUT COMPLICATION, WITHOUT LONG-TERM CURRENT USE OF INSULIN: ICD-10-CM

## 2025-01-17 DIAGNOSIS — E53.8 LOW SERUM VITAMIN B12: ICD-10-CM

## 2025-01-17 DIAGNOSIS — I10 ESSENTIAL HYPERTENSION: ICD-10-CM

## 2025-01-17 DIAGNOSIS — E61.1 IRON DEFICIENCY: ICD-10-CM

## 2025-01-17 DIAGNOSIS — E78.49 OTHER HYPERLIPIDEMIA: ICD-10-CM

## 2025-01-17 DIAGNOSIS — E55.9 VITAMIN D DEFICIENCY: Primary | ICD-10-CM

## 2025-01-17 RX ORDER — ERGOCALCIFEROL 1.25 MG/1
50000 CAPSULE ORAL
Qty: 12 CAPSULE | Refills: 3 | Status: SHIPPED | OUTPATIENT
Start: 2025-01-17 | End: 2025-02-03

## 2025-01-17 RX ORDER — FERROUS SULFATE 325(65) MG
325 TABLET ORAL DAILY
Qty: 90 TABLET | Refills: 0 | Status: SHIPPED | OUTPATIENT
Start: 2025-01-17 | End: 2025-02-03

## 2025-01-17 RX ORDER — LANOLIN ALCOHOL/MO/W.PET/CERES
1000 CREAM (GRAM) TOPICAL DAILY
Qty: 90 TABLET | Refills: 5 | Status: SHIPPED | OUTPATIENT
Start: 2025-01-17 | End: 2025-02-03

## 2025-01-17 NOTE — TELEPHONE ENCOUNTER
Please set up f/u 4 months with me as well  Labs prior to that apt.     Tell her to check her portal.

## 2025-01-17 NOTE — TELEPHONE ENCOUNTER
Called patient to let her know to check her portal per Dr Iniguez. No answer/ Mailbox full unable to leave voicemail.

## 2025-01-30 ENCOUNTER — HOSPITAL ENCOUNTER (OUTPATIENT)
Dept: CARDIOLOGY | Facility: HOSPITAL | Age: 65
Discharge: HOME | End: 2025-01-30
Attending: FAMILY MEDICINE
Payer: MEDICARE

## 2025-01-30 VITALS — BODY MASS INDEX: 40.29 KG/M2 | HEIGHT: 64 IN | WEIGHT: 236 LBS

## 2025-01-30 DIAGNOSIS — R06.02 SOB (SHORTNESS OF BREATH): ICD-10-CM

## 2025-01-30 LAB
APICAL FOUR CHAMBER EJECTION FRACTION: 45 %
APICAL TWO CHAMBER EJECTION FRACTION: 62 %
ASCENDING AORTA: 4.8 CM
AV INDEX (PROSTH): 0.67
AV MEAN GRADIENT: 5 MMHG
AV PEAK GRADIENT: 9 MMHG
AV REGURGITATION PRESSURE HALF TIME: 324 MS
AV VALVE AREA BY VELOCITY RATIO: 2.8 CM²
AV VALVE AREA: 2.8 CM²
AV VELOCITY RATIO: 0.67
BSA FOR ECHO PROCEDURE: 2.2 M2
CV ECHO LV RWT: 0.24 CM
DOP CALC AO PEAK VEL: 1.5 M/S
DOP CALC AO VTI: 29 CM
DOP CALC LVOT AREA: 4.2 CM2
DOP CALC LVOT DIAMETER: 2.3 CM
DOP CALC LVOT PEAK VEL: 1 M/S
DOP CALC LVOT STROKE VOLUME: 80.6 CM3
DOP CALC MV VTI: 16.6 CM
DOP CALCLVOT PEAK VEL VTI: 19.4 CM
E WAVE DECELERATION TIME: 113 MSEC
E/A RATIO: 0.84
E/E' RATIO: 10 M/S
ECHO LV POSTERIOR WALL: 0.6 CM (ref 0.6–1.1)
FRACTIONAL SHORTENING: 28 % (ref 28–44)
GLOBAL LONGITUIDAL STRAIN: 18 %
HR MV ECHO: 90 BPM
INTERVENTRICULAR SEPTUM: 1 CM (ref 0.6–1.1)
IVC DIAMETER: 1.91 CM
IVRT: 114 MSEC
LEFT ATRIUM AREA SYSTOLIC (APICAL 2 CHAMBER): 20.14 CM2
LEFT ATRIUM AREA SYSTOLIC (APICAL 4 CHAMBER): 18.55 CM2
LEFT ATRIUM VOLUME INDEX MOD: 26 ML/M2
LEFT ATRIUM VOLUME MOD: 55 ML
LEFT INTERNAL DIMENSION IN SYSTOLE: 3.6 CM (ref 2.1–4)
LEFT VENTRICLE DIASTOLIC VOLUME INDEX: 57.32 ML/M2
LEFT VENTRICLE DIASTOLIC VOLUME: 120.38 ML
LEFT VENTRICLE END DIASTOLIC VOLUME APICAL 2 CHAMBER: 74.95 ML
LEFT VENTRICLE END DIASTOLIC VOLUME APICAL 4 CHAMBER: 67.38 ML
LEFT VENTRICLE END SYSTOLIC VOLUME APICAL 2 CHAMBER: 55.24 ML
LEFT VENTRICLE END SYSTOLIC VOLUME APICAL 4 CHAMBER: 49.64 ML
LEFT VENTRICLE MASS INDEX: 64.7 G/M2
LEFT VENTRICLE SYSTOLIC VOLUME INDEX: 25.7 ML/M2
LEFT VENTRICLE SYSTOLIC VOLUME: 53.88 ML
LEFT VENTRICULAR INTERNAL DIMENSION IN DIASTOLE: 5 CM (ref 3.5–6)
LEFT VENTRICULAR MASS: 135.8 G
LV LATERAL E/E' RATIO: 9 M/S
LV SEPTAL E/E' RATIO: 11.6 M/S
LVED V (TEICH): 120.38 ML
LVES V (TEICH): 53.88 ML
LVOT MG: 2.18 MMHG
LVOT MV: 0.71 CM/S
MV A" WAVE DURATION": 74.22 MSEC
MV MEAN GRADIENT: 2 MMHG
MV PEAK A VEL: 0.97 M/S
MV PEAK E VEL: 0.81 M/S
MV PEAK GRADIENT: 5 MMHG
MV STENOSIS PRESSURE HALF TIME: 32.75 MS
MV VALVE AREA BY CONTINUITY EQUATION: 4.85 CM2
MV VALVE AREA P 1/2 METHOD: 6.72 CM2
OHS CV RV/LV RATIO: 0.64 CM
OHS LV EJECTION FRACTION SIMPSONS BIPLANE MOD: 55 %
PISA AR MAX VEL: 5.43 M/S
PISA MRMAX VEL: 6.02 M/S
PISA TR MAX VEL: 2 M/S
PV PEAK GRADIENT: 3 MMHG
PV PEAK VELOCITY: 0.9 M/S
RA PRESSURE ESTIMATED: 3 MMHG
RA VOL SYS: 26.4 ML
RIGHT ATRIAL AREA: 12.8 CM2
RIGHT ATRIUM VOLUME AREA LENGTH APICAL 4 CHAMBER: 26.04 ML
RIGHT VENTRICLE DIASTOLIC BASEL DIMENSION: 3.2 CM
RV TB RVSP: 5 MMHG
RV TISSUE DOPPLER FREE WALL SYSTOLIC VELOCITY 1 (APICAL 4 CHAMBER VIEW): 10.76 CM/S
SINUS: 2.86 CM
STJ: 3.43 CM
TDI LATERAL: 0.09 M/S
TDI SEPTAL: 0.07 M/S
TDI: 0.08 M/S
TR MAX PG: 16 MMHG
TRICUSPID ANNULAR PLANE SYSTOLIC EXCURSION: 1.56 CM
TV REST PULMONARY ARTERY PRESSURE: 19 MMHG
Z-SCORE OF LEFT VENTRICULAR DIMENSION IN END DIASTOLE: -2.67
Z-SCORE OF LEFT VENTRICULAR DIMENSION IN END SYSTOLE: -0.81

## 2025-01-30 PROCEDURE — 93356 MYOCRD STRAIN IMG SPCKL TRCK: CPT | Mod: PN

## 2025-01-30 PROCEDURE — 93306 TTE W/DOPPLER COMPLETE: CPT | Mod: 26,,, | Performed by: INTERNAL MEDICINE

## 2025-01-30 PROCEDURE — 93010 ELECTROCARDIOGRAM REPORT: CPT | Mod: ,,, | Performed by: INTERNAL MEDICINE

## 2025-01-30 PROCEDURE — 93356 MYOCRD STRAIN IMG SPCKL TRCK: CPT | Mod: ,,, | Performed by: INTERNAL MEDICINE

## 2025-01-30 PROCEDURE — 93005 ELECTROCARDIOGRAM TRACING: CPT | Mod: PN

## 2025-01-31 ENCOUNTER — TELEPHONE (OUTPATIENT)
Dept: FAMILY MEDICINE | Facility: CLINIC | Age: 65
End: 2025-01-31
Payer: MEDICARE

## 2025-01-31 DIAGNOSIS — I71.21 ANEURYSM OF ASCENDING AORTA WITHOUT RUPTURE: Primary | ICD-10-CM

## 2025-01-31 DIAGNOSIS — I35.1 AORTIC VALVE INSUFFICIENCY, ETIOLOGY OF CARDIAC VALVE DISEASE UNSPECIFIED: ICD-10-CM

## 2025-02-01 LAB
OHS QRS DURATION: 92 MS
OHS QTC CALCULATION: 432 MS

## 2025-02-03 ENCOUNTER — OFFICE VISIT (OUTPATIENT)
Dept: FAMILY MEDICINE | Facility: CLINIC | Age: 65
End: 2025-02-03
Payer: MEDICARE

## 2025-02-03 ENCOUNTER — OCHSNER VIRTUAL EMERGENCY DEPARTMENT (OUTPATIENT)
Facility: CLINIC | Age: 65
End: 2025-02-03
Payer: MEDICARE

## 2025-02-03 ENCOUNTER — HOSPITAL ENCOUNTER (EMERGENCY)
Facility: HOSPITAL | Age: 65
Discharge: HOME | End: 2025-02-03
Attending: EMERGENCY MEDICINE
Payer: MEDICARE

## 2025-02-03 VITALS
RESPIRATION RATE: 18 BRPM | SYSTOLIC BLOOD PRESSURE: 183 MMHG | HEART RATE: 108 BPM | BODY MASS INDEX: 40.29 KG/M2 | OXYGEN SATURATION: 97 % | WEIGHT: 236 LBS | DIASTOLIC BLOOD PRESSURE: 131 MMHG | HEIGHT: 64 IN | TEMPERATURE: 99 F

## 2025-02-03 VITALS
HEART RATE: 107 BPM | DIASTOLIC BLOOD PRESSURE: 98 MMHG | BODY MASS INDEX: 40.39 KG/M2 | SYSTOLIC BLOOD PRESSURE: 156 MMHG | OXYGEN SATURATION: 97 % | WEIGHT: 236.56 LBS | HEIGHT: 64 IN

## 2025-02-03 DIAGNOSIS — R06.02 SOB (SHORTNESS OF BREATH): ICD-10-CM

## 2025-02-03 DIAGNOSIS — R06.02 SOB (SHORTNESS OF BREATH): Primary | ICD-10-CM

## 2025-02-03 DIAGNOSIS — Z00.00 ENCOUNTER FOR ANNUAL PHYSICAL EXAM: ICD-10-CM

## 2025-02-03 DIAGNOSIS — I10 UNCONTROLLED HYPERTENSION: ICD-10-CM

## 2025-02-03 DIAGNOSIS — R93.1 ABNORMAL ECHOCARDIOGRAM: ICD-10-CM

## 2025-02-03 DIAGNOSIS — R00.0 TACHYCARDIA: Primary | ICD-10-CM

## 2025-02-03 DIAGNOSIS — R00.0 TACHYCARDIA: ICD-10-CM

## 2025-02-03 LAB
ALBUMIN SERPL BCP-MCNC: 3.5 G/DL (ref 3.5–5.2)
ALP SERPL-CCNC: 104 U/L (ref 40–150)
ALT SERPL W/O P-5'-P-CCNC: 7 U/L (ref 10–44)
ANION GAP SERPL CALC-SCNC: 9 MMOL/L (ref 8–16)
AST SERPL-CCNC: 9 U/L (ref 10–40)
BASOPHILS # BLD AUTO: 0.02 K/UL (ref 0–0.2)
BASOPHILS NFR BLD: 0.3 % (ref 0–1.9)
BILIRUB SERPL-MCNC: 0.3 MG/DL (ref 0.1–1)
BNP SERPL-MCNC: 31 PG/ML (ref 0–99)
BUN SERPL-MCNC: 10 MG/DL (ref 8–23)
CALCIUM SERPL-MCNC: 9.3 MG/DL (ref 8.7–10.5)
CHLORIDE SERPL-SCNC: 107 MMOL/L (ref 95–110)
CO2 SERPL-SCNC: 23 MMOL/L (ref 23–29)
CREAT SERPL-MCNC: 1.1 MG/DL (ref 0.5–1.4)
DIFFERENTIAL METHOD BLD: ABNORMAL
EOSINOPHIL # BLD AUTO: 0 K/UL (ref 0–0.5)
EOSINOPHIL NFR BLD: 0 % (ref 0–8)
ERYTHROCYTE [DISTWIDTH] IN BLOOD BY AUTOMATED COUNT: 16.4 % (ref 11.5–14.5)
EST. GFR  (NO RACE VARIABLE): 56 ML/MIN/1.73 M^2
GLUCOSE SERPL-MCNC: 146 MG/DL (ref 70–110)
HCT VFR BLD AUTO: 42.7 % (ref 37–48.5)
HGB BLD-MCNC: 13.6 G/DL (ref 12–16)
IMM GRANULOCYTES # BLD AUTO: 0.02 K/UL (ref 0–0.04)
IMM GRANULOCYTES NFR BLD AUTO: 0.3 % (ref 0–0.5)
LYMPHOCYTES # BLD AUTO: 0.9 K/UL (ref 1–4.8)
LYMPHOCYTES NFR BLD: 13.9 % (ref 18–48)
MCH RBC QN AUTO: 27.8 PG (ref 27–31)
MCHC RBC AUTO-ENTMCNC: 31.9 G/DL (ref 32–36)
MCV RBC AUTO: 87 FL (ref 82–98)
MONOCYTES # BLD AUTO: 0.1 K/UL (ref 0.3–1)
MONOCYTES NFR BLD: 1.8 % (ref 4–15)
NEUTROPHILS # BLD AUTO: 5.5 K/UL (ref 1.8–7.7)
NEUTROPHILS NFR BLD: 83.7 % (ref 38–73)
NRBC BLD-RTO: 0 /100 WBC
OHS QRS DURATION: 92 MS
OHS QTC CALCULATION: 462 MS
PLATELET # BLD AUTO: 275 K/UL (ref 150–450)
PMV BLD AUTO: 9.2 FL (ref 9.2–12.9)
POTASSIUM SERPL-SCNC: 4.3 MMOL/L (ref 3.5–5.1)
PROT SERPL-MCNC: 7.6 G/DL (ref 6–8.4)
RBC # BLD AUTO: 4.9 M/UL (ref 4–5.4)
SODIUM SERPL-SCNC: 139 MMOL/L (ref 136–145)
TROPONIN I SERPL DL<=0.01 NG/ML-MCNC: <0.006 NG/ML (ref 0–0.03)
WBC # BLD AUTO: 6.55 K/UL (ref 3.9–12.7)

## 2025-02-03 PROCEDURE — 3061F NEG MICROALBUMINURIA REV: CPT | Mod: CPTII,S$GLB,,

## 2025-02-03 PROCEDURE — 93005 ELECTROCARDIOGRAM TRACING: CPT | Mod: S$GLB,,,

## 2025-02-03 PROCEDURE — 1159F MED LIST DOCD IN RCRD: CPT | Mod: CPTII,S$GLB,,

## 2025-02-03 PROCEDURE — 3008F BODY MASS INDEX DOCD: CPT | Mod: CPTII,S$GLB,,

## 2025-02-03 PROCEDURE — 25000003 PHARM REV CODE 250: Performed by: EMERGENCY MEDICINE

## 2025-02-03 PROCEDURE — 93010 ELECTROCARDIOGRAM REPORT: CPT | Mod: S$GLB,,, | Performed by: INTERNAL MEDICINE

## 2025-02-03 PROCEDURE — 99999 PR PBB SHADOW E&M-EST. PATIENT-LVL V: CPT | Mod: PBBFAC,,,

## 2025-02-03 PROCEDURE — 80053 COMPREHEN METABOLIC PANEL: CPT | Performed by: EMERGENCY MEDICINE

## 2025-02-03 PROCEDURE — 85025 COMPLETE CBC W/AUTO DIFF WBC: CPT | Performed by: EMERGENCY MEDICINE

## 2025-02-03 PROCEDURE — 3077F SYST BP >= 140 MM HG: CPT | Mod: CPTII,S$GLB,,

## 2025-02-03 PROCEDURE — 1101F PT FALLS ASSESS-DOCD LE1/YR: CPT | Mod: CPTII,S$GLB,,

## 2025-02-03 PROCEDURE — 3044F HG A1C LEVEL LT 7.0%: CPT | Mod: CPTII,S$GLB,,

## 2025-02-03 PROCEDURE — 63600175 PHARM REV CODE 636 W HCPCS: Performed by: EMERGENCY MEDICINE

## 2025-02-03 PROCEDURE — 83880 ASSAY OF NATRIURETIC PEPTIDE: CPT | Performed by: EMERGENCY MEDICINE

## 2025-02-03 PROCEDURE — 99215 OFFICE O/P EST HI 40 MIN: CPT | Mod: S$GLB,,,

## 2025-02-03 PROCEDURE — 25500020 PHARM REV CODE 255: Performed by: EMERGENCY MEDICINE

## 2025-02-03 PROCEDURE — 93010 ELECTROCARDIOGRAM REPORT: CPT | Mod: S$GLB,,, | Performed by: STUDENT IN AN ORGANIZED HEALTH CARE EDUCATION/TRAINING PROGRAM

## 2025-02-03 PROCEDURE — 3080F DIAST BP >= 90 MM HG: CPT | Mod: CPTII,S$GLB,,

## 2025-02-03 PROCEDURE — 3066F NEPHROPATHY DOC TX: CPT | Mod: CPTII,S$GLB,,

## 2025-02-03 PROCEDURE — 96375 TX/PRO/DX INJ NEW DRUG ADDON: CPT | Mod: 59

## 2025-02-03 PROCEDURE — 84484 ASSAY OF TROPONIN QUANT: CPT | Performed by: EMERGENCY MEDICINE

## 2025-02-03 PROCEDURE — G2211 COMPLEX E/M VISIT ADD ON: HCPCS | Mod: S$GLB,,,

## 2025-02-03 PROCEDURE — 99285 EMERGENCY DEPT VISIT HI MDM: CPT | Mod: 25

## 2025-02-03 PROCEDURE — 96374 THER/PROPH/DIAG INJ IV PUSH: CPT

## 2025-02-03 PROCEDURE — 1160F RVW MEDS BY RX/DR IN RCRD: CPT | Mod: CPTII,S$GLB,,

## 2025-02-03 PROCEDURE — 93005 ELECTROCARDIOGRAM TRACING: CPT

## 2025-02-03 PROCEDURE — 3288F FALL RISK ASSESSMENT DOCD: CPT | Mod: CPTII,S$GLB,,

## 2025-02-03 RX ORDER — AMLODIPINE BESYLATE 5 MG/1
10 TABLET ORAL
Status: COMPLETED | OUTPATIENT
Start: 2025-02-03 | End: 2025-02-03

## 2025-02-03 RX ORDER — DIPHENHYDRAMINE HYDROCHLORIDE 50 MG/ML
50 INJECTION INTRAMUSCULAR; INTRAVENOUS ONCE
Status: COMPLETED | OUTPATIENT
Start: 2025-02-03 | End: 2025-02-03

## 2025-02-03 RX ORDER — METOPROLOL SUCCINATE 50 MG/1
50 TABLET, EXTENDED RELEASE ORAL DAILY
Status: DISCONTINUED | OUTPATIENT
Start: 2025-02-04 | End: 2025-02-03

## 2025-02-03 RX ORDER — AMITRIPTYLINE HYDROCHLORIDE 150 MG/1
150 TABLET ORAL NIGHTLY
COMMUNITY
Start: 2025-01-31

## 2025-02-03 RX ORDER — DIPHENHYDRAMINE HYDROCHLORIDE 50 MG/ML
50 INJECTION INTRAMUSCULAR; INTRAVENOUS ONCE
Status: DISCONTINUED | OUTPATIENT
Start: 2025-02-03 | End: 2025-02-03

## 2025-02-03 RX ADMIN — HYDROCORTISONE SODIUM SUCCINATE 200 MG: 100 INJECTION, POWDER, FOR SOLUTION INTRAMUSCULAR; INTRAVENOUS at 02:02

## 2025-02-03 RX ADMIN — DIPHENHYDRAMINE HYDROCHLORIDE 50 MG: 50 INJECTION, SOLUTION INTRAMUSCULAR; INTRAVENOUS at 03:02

## 2025-02-03 RX ADMIN — IOHEXOL 100 ML: 350 INJECTION, SOLUTION INTRAVENOUS at 04:02

## 2025-02-03 RX ADMIN — AMLODIPINE BESYLATE 10 MG: 5 TABLET ORAL at 05:02

## 2025-02-03 NOTE — PLAN OF CARE-OVED
[facility] Plan of Care Note    Referral source: Primary Care Provider      Reason for consult: Shortness of Breath      Additional History:  Patient with history of breast cancer, atrial fibrillation, not currently anticoagulated, [facility] complaining of dizziness, shortness of breath.  Noted to be tachycardic.  Due to concern for cardiac concern verses PE, further evaluation in the ER was recommended      Disposition recommended: Emergency Department      Additional Recommendations:  Patient prefers [place] emergency room.  Providers on-call notified

## 2025-02-03 NOTE — ED PROVIDER NOTES
Encounter Date: 2/3/2025    History     Chief Complaint   Patient presents with    Shortness of Breath     Pt reports SOB during exertion, stating it has been going on since yesterday.          HPI  This is a 65 y.o. female who presents to the Emergency Department with shortness of breath, described as dyspnea on exertion has been ongoing for the last several days, worse yesterday.  No associated cough, orthopnea, fever.  Has leg swelling bilaterally, worse on the left.  Had history of the AFib in the past, cardioverted.  States also that she was on Xarelto but was stopped due to cost, but states that she can afford it now.    History obtained for alternative sources:  [facility] physician, Dr. Gaming, regarding patient presentation in clinic and HPI consistent with patient's story.  Mentions associated dizziness and history of breast CA.    Previous or outside records requested and reviewed:  PCP note from today presenting short of breath and dizzy, started yesterday, EKG with sinus tach, history of breast cancer and no anticoagulants in 2 years.    Cardioversion procedure 03/16/2022 successfully performed for PAF, with restoration of normal sinus rhythm    01/16/2025 iron and TIBC results.  Iron, transferrin and TIBC WNL, however saturated iron 12% which is low        Review of patient's allergies indicates:   Allergen Reactions    Iodine and iodide containing products Swelling    Shellfish containing products Swelling    Shellfish derived Swelling    Iodinated contrast media Swelling    Pcn [penicillins] Swelling     Past Medical History:   Diagnosis Date    Asthma     Breast cancer     Coronary artery disease     Diabetes mellitus     Estrogen receptor positive 06/05/2023    Hypertension      Past Surgical History:   Procedure Laterality Date    BREAST LUMPECTOMY      HYSTERECTOMY      MASTECTOMY      TREATMENT OF CARDIAC ARRHYTHMIA N/A 3/16/2022    Procedure: Cardioversion or Defibrillation;  Surgeon: Shanika BROWNE  MD Pramod;  Location: [facility] CATH LAB/EP;  Service: Cardiology;  Laterality: N/A;     Family History   Problem Relation Name Age of Onset    Hypertension Mother      Breast cancer Other Maternal Aunt 70    Eclampsia Neg Hx      Diabetes Neg Hx      Colon cancer Neg Hx      Miscarriages / Stillbirths Neg Hx      Ovarian cancer Neg Hx       labor Neg Hx      Stroke Neg Hx       Social History     Tobacco Use    Smoking status: Never    Smokeless tobacco: Never   Substance Use Topics    Alcohol use: No    Drug use: No       Review of Systems  All other systems reviewed and otherwise negative.    Physical Exam     Initial Vitals [25 1212]   BP Pulse Resp Temp SpO2   (!) 188/114 (!) 118 18 98.9 °F (37.2 °C) 97 %      MAP       --         Physical Exam    Nursing note and vitals reviewed.  Constitutional: She appears well-developed and well-nourished. She is not diaphoretic. No distress.   HENT:   Head: Normocephalic and atraumatic. Mouth/Throat: Oropharynx is clear and moist.   Eyes: Conjunctivae are normal. Pupils are equal, round, and reactive to light.   Neck: Neck supple.   Cardiovascular:  Regular rhythm, normal heart sounds and intact distal pulses.           Tachycardia   Pulmonary/Chest: Breath sounds normal. No respiratory distress. She has no wheezes. She has no rhonchi. She has no rales.   Abdominal: Abdomen is soft. Bowel sounds are normal. She exhibits no distension. There is no abdominal tenderness. There is no guarding.   Musculoskeletal:         General: Edema (2+ bilaterally, left > right) present. No tenderness. Normal range of motion.      Cervical back: Neck supple.     Neurological: She is alert and oriented to person, place, and time.   Skin: Skin is warm and dry. Capillary refill takes less than 2 seconds. No rash noted.   Psychiatric: She has a normal mood and affect. Thought content normal.           Medical Decision Making:     Differential Diagnosis:  hypertensive emergency, CHF,  MI, PE, anemia paroxysmal AFib, electrolyte abnormality    Comorbidities taken into consideration for development of diagnosis and treatment plan include HTN and breast cancer, history of AFib.      Plan:  Orders Placed This Encounter    CTA Chest Non-Coronary (PE Studies)    CBC Auto Differential    Comprehensive Metabolic Panel    Troponin I    BNP    EKG 12-lead (Shortness of Breath) Age > 50    hydrocortisone sodium succinate injection 200 mg    diphenhydrAMINE injection 50 mg    iohexoL (OMNIPAQUE 350) injection 100 mL    amLODIPine tablet 10 mg        Social determinants of health taken into consideration during development of our treatment plan include: Limited access to healthcare and Health Literacy    Procedures  Studies:   Labs:  Labs Reviewed   CBC W/ AUTO DIFFERENTIAL - Abnormal       Result Value    WBC 6.55      RBC 4.90      Hemoglobin 13.6      Hematocrit 42.7      MCV 87      MCH 27.8      MCHC 31.9 (*)     RDW 16.4 (*)     Platelets 275      MPV 9.2      Immature Granulocytes 0.3      Gran # (ANC) 5.5      Immature Grans (Abs) 0.02      Lymph # 0.9 (*)     Mono # 0.1 (*)     Eos # 0.0      Baso # 0.02      nRBC 0      Gran % 83.7 (*)     Lymph % 13.9 (*)     Mono % 1.8 (*)     Eosinophil % 0.0      Basophil % 0.3      Differential Method Automated     COMPREHENSIVE METABOLIC PANEL - Abnormal    Sodium 139      Potassium 4.3      Chloride 107      CO2 23      Glucose 146 (*)     BUN 10      Creatinine 1.1      Calcium 9.3      Total Protein 7.6      Albumin 3.5      Total Bilirubin 0.3      Alkaline Phosphatase 104      AST 9 (*)     ALT 7 (*)     eGFR 56 (*)     Anion Gap 9     TROPONIN I    Troponin I <0.006     B-TYPE NATRIURETIC PEPTIDE    BNP 31         Imaging:  X-Rays:   Independently Interpreted Readings:   Other Readings:  CTA chest with contrast/PE study: See ED course    Imaging Results              CTA Chest Non-Coronary (PE Studies) (Final result)  Result time 02/03/25 17:49:39       Final result by Hiren Cummins MD (02/03/25 17:49:39)                   Impression:      1. No evidence of acute pulmonary thromboembolism.  2. Enlarged pulmonary arteries.  Correlate for pulmonary arterial hypertension.  3. Ectatic aorta with 4.5 cm ascending aorta ectasia.  Follow-up is advised.  4. Three vessel coronary artery disease, left greater than right.      Electronically signed by: Hiren Cummins  Date:    02/03/2025  Time:    17:49               Narrative:    EXAMINATION:  CTA CHEST NON CORONARY (PE STUDIES)    CLINICAL HISTORY:  Pulmonary embolism (PE) suspected, high prob;    TECHNIQUE:  Following the intravenous administration of 75 cc of Omnipaque 350 contrast material, 1.25 mm contiguous axial images were acquired through the chest utilizing the CT pulmonary angiogram protocol.  2-D coronal and sagittal reconstructed images of the chest and sagittal oblique MIP images of the pulmonary arterial system were generated from the source data.    COMPARISON:  None.    FINDINGS:  Pulmonary arterial system: This is a good quality examination for the evaluation of pulmonary thromboembolism with good opacification of the pulmonary arteries to the level of the segmental branches of the pulmonary arterial system. There is no evidence of acute pulmonary thromboembolism. No large saddle pulmonary embolism.  There is enlargement of the main pulmonary artery.    Aorta and heart: The heart is enlarged in size.  No pericardial fluid.  No thoracic aortic aneurysm.  However, the ascending aortic arch is ectatic and mildly prominent 4.5 cm above the sino-tubular junction.  No thoracic aortic dissection.  Three-vessel coronary artery calcifications left greater than right.    Airways: Unremarkable.    Lymph nodes: No pathologically enlarged lymph nodes in the chest or axilla.    Lungs: The lungs are clear with no evidence of airspace consolidation, mass, or bronchiectasis.  No emphysematous lung  architecture.    Pleura: No significant volume of pleural fluid or pneumothorax.  No pleural based masses.    Visualized upper abdominal soft tissues: No significant abnormalities appreciated.    Bones: There is degenerative change of the thoracic spine.                                         ED Course:     ED Course as of 02/04/25 1410   Mon Feb 03, 2025   1641 CTA Chest Non-Coronary (PE Studies)  No PE visualized as independently interpreted by me.  Pending Radiology interpretation [NP]      ED Course User Index  [NP] Narciso Welsh MD               Radiology interpretation states no acute findings on CTA of the chest.  No evidence of PE, infiltrates, pulmonary edema or consolidation or effusion.    Labs reviewed.  No elevation in troponin, BNP is WNL.  No leukocytosis on CBC.  CMP with mild hyperglycemia, otherwise unremarkable.    Shortness of breath maybe due to tachycardia and elevated blood pressure due to not taking medication as well as increased caffeine use today.  Discussed caffeine limitation and compliance with blood pressure and heart rate limiting medications such as her beta-blocker.     Clinical impression and plan including any treatment was discussed with patient.   Pt is to call for follow up with PCP or referred physician in 7 days.   Pt is to return to the ED immediately for any new or worsening symptoms.  They are always welcome to return for any emergent concerns.    The patient had time for ask questions to which they were addressed.   The patient expressed understanding and agrees with my plan.          Clinical Impression:   Final diagnoses:  [R06.02] SOB (shortness of breath) (Primary)  [R00.0] Tachycardia  [I10] Uncontrolled hypertension         ED Prescriptions    None       Follow-up Information       Follow up With Specialties Details Why Contact Info    [facility] Pulmonology Schedule an appointment as soon as possible for a visit   [address]  [place] [address]  [phone]          No orders of the defined types were placed in this encounter.      DISCLAIMER: This note was prepared with [employer] voice recognition transcription software. Garbled syntax, mangled pronouns, and other bizarre constructions may be attributed to that software system.     Narciso Welsh MD  02/04/25 [id number]

## 2025-02-03 NOTE — ED TRIAGE NOTES
Pt reports SOB during exertion, stating it has been going on since yesterday.      States she is premedicated for Iodinated contrast.

## 2025-02-03 NOTE — PROGRESS NOTES
"Subjective     Patient ID: Yeny Hargrove is a 65 y.o. female.    Chief Complaint: Follow-up    65 y/o female with a fib, HLD, HTN, HLD, DM, and s/p breast cancer  Presents to clinic for follow up    In clinic she states that she is dizzy, SOB. This started yesterday  Bp in clinic 110  EKG shows sinus tachycardia     She has h/o breast cancer and a fib. She has not taken anticoagulants in 2 years.     Went over meds  She is taking   metformin 500  Amlodipine 10  Elavil for sleep  Pregablin 100- this is not on her list. States that she gets from an outside drVu Meza?  Metoprolol 50  Atorvastatin 80  Anastrozole         HPI  Review of Systems   Constitutional:  Negative for fatigue and fever.   Respiratory:  Positive for shortness of breath. Negative for cough and wheezing.    Cardiovascular:  Negative for chest pain, palpitations, leg swelling and claudication.   Gastrointestinal:  Negative for diarrhea, nausea and vomiting.   Neurological:  Positive for dizziness. Negative for light-headedness and headaches.          Objective     Vitals:    02/03/25 1102   BP: (!) 156/98   Pulse: 107   SpO2: 97%   Weight: 107.3 kg (236 lb 8.9 oz)   Height: 5' 4" (1.626 m)   PainSc: 0-No pain      Physical Exam  Constitutional:       General: She is not in acute distress.  HENT:      Head: Normocephalic and atraumatic.   Eyes:      General:         Right eye: No discharge.         Left eye: No discharge.      Conjunctiva/sclera: Conjunctivae normal.   Cardiovascular:      Rate and Rhythm: Tachycardia present.   Pulmonary:      Breath sounds: No wheezing.   Lymphadenopathy:      Cervical: No cervical adenopathy.   Neurological:      Mental Status: She is alert and oriented to person, place, and time.              Assessment and Plan     1. Tachycardia  -     IN OFFICE EKG 12-LEAD (to Cameron)  -     Refer to Emergency Dept.    2. SOB (shortness of breath)  -     IN OFFICE EKG 12-LEAD (to [facility])  -     Refer to Emergency Dept.    3. " Abnormal echocardiogram  -     Ambulatory referral/consult to Cardiology; Future; Expected date: 02/10/2025    4. Encounter for annual physical exam  -     Ambulatory referral/consult to Gynecology; Future; Expected date: 02/10/2025    EKG in clinic showed sinus tach  Pt having new onset SOB and dizziness since yesterday  Blood pressure elevated  -117  Has not been on anticoagulation in over 2 years  Denies recent travel or leg swelling     Pt ECHO showed ascending aortic dilation and aortic valve regurgitation   Ref placed to cardiology    Ref to EP previously placed     RICHIE  Ref to ED  SBARC placed     Appt with Ronit next week         No follow-ups on file.    40 minutes of total time spent on the encounter, which includes face to face time and non-face to face time preparing to see the patient (eg, review of tests), Obtaining and/or reviewing separately obtained history, Documenting clinical information in the electronic or other health record, Independently interpreting results (not separately reported) and communicating results to the patient/family/caregiver, or Care coordination (not separately reported).      Ronit Clements PA-C  Family Practice/Internal Medicine   Office Phone: [phone]

## 2025-02-04 NOTE — DISCHARGE INSTRUCTIONS
Remember to take your medicine every day.     Your blood pressure needs to be under 140/90.    Thank you for coming in to see us today! It was nice to meet you, and I hope you feel better soon. Please feel free to return to the ER at any time should your symptoms get worse, or if you have different emergent concerns.    Our goal in the emergency department is to always give you outstanding care and exceptional service. You may receive a survey by mail or e-mail in the next week regarding your experience in our ED. We would greatly appreciate your completing and returning the survey. Your feedback provides us with a way to recognize our staff who give very good care and it helps us learn how to improve when your experience was below our aspiration of excellence.       Sincerely,    Narciso Welsh MD  Medical Director, Emergency Department  [facility]

## 2025-02-05 LAB
OHS QRS DURATION: 92 MS
OHS QTC CALCULATION: 462 MS

## 2025-02-12 ENCOUNTER — OFFICE VISIT (OUTPATIENT)
Dept: FAMILY MEDICINE | Facility: CLINIC | Age: 65
End: 2025-02-12
Payer: MEDICARE

## 2025-02-12 VITALS
OXYGEN SATURATION: 95 % | TEMPERATURE: 99 F | HEART RATE: 99 BPM | DIASTOLIC BLOOD PRESSURE: 100 MMHG | HEIGHT: 64 IN | SYSTOLIC BLOOD PRESSURE: 170 MMHG | BODY MASS INDEX: 40.19 KG/M2 | WEIGHT: 235.44 LBS

## 2025-02-12 DIAGNOSIS — U07.1 COVID-19 VIRUS DETECTED: ICD-10-CM

## 2025-02-12 DIAGNOSIS — E11.9 TYPE 2 DIABETES MELLITUS WITHOUT COMPLICATION, UNSPECIFIED WHETHER LONG TERM INSULIN USE: ICD-10-CM

## 2025-02-12 DIAGNOSIS — U07.1 COVID: Primary | ICD-10-CM

## 2025-02-12 DIAGNOSIS — R09.81 SINUS CONGESTION: ICD-10-CM

## 2025-02-12 DIAGNOSIS — R05.9 COUGH, UNSPECIFIED TYPE: ICD-10-CM

## 2025-02-12 LAB
CTP QC/QA: YES
CTP QC/QA: YES
POC MOLECULAR INFLUENZA A AGN: NEGATIVE
POC MOLECULAR INFLUENZA B AGN: NEGATIVE
SARS-COV-2 RDRP RESP QL NAA+PROBE: POSITIVE

## 2025-02-12 PROCEDURE — 99999 PR PBB SHADOW E&M-EST. PATIENT-LVL V: CPT | Mod: PBBFAC,,,

## 2025-02-12 NOTE — PATIENT INSTRUCTIONS
COVID positive  Continue OTC medications  Start Paxlovid  Tylenol as needed for fever  Stay hydrated       Appt with Ronit in 2 weeks for blood pressure     Appt with Cardiology in March

## 2025-02-12 NOTE — PROGRESS NOTES
"Subjective     Patient ID: Yeny Hargrove is a 65 y.o. female.    Chief Complaint: Follow-up, Cough, and Fatigue    66 y/o female with HTN, HLD, A-fib, and DM presents to clinic with 3 day history of cough, sinus congestion, and fatigue. She has taken emergen C and OTC cough syrup for this.   Her sister is also sick       Follow-up  Associated symptoms include congestion, coughing, fatigue and a fever. Pertinent negatives include no chest pain, headaches, nausea, sore throat or vomiting.   Cough  Associated symptoms include a fever and postnasal drip. Pertinent negatives include no chest pain, headaches, sore throat, shortness of breath or wheezing.   Fatigue  Associated symptoms include congestion, coughing, fatigue and a fever. Pertinent negatives include no chest pain, headaches, nausea, sore throat or vomiting.     Review of Systems   Constitutional:  Positive for fatigue and fever.   HENT:  Positive for nasal congestion, postnasal drip and sinus pressure/congestion. Negative for sore throat and trouble swallowing.    Respiratory:  Positive for cough. Negative for shortness of breath and wheezing.    Cardiovascular:  Negative for chest pain, palpitations and leg swelling.   Gastrointestinal:  Negative for diarrhea, nausea and vomiting.   Neurological:  Negative for dizziness, light-headedness and headaches.          Objective     Vitals:    02/12/25 1323 02/12/25 1406   BP: (!) 180/108 (!) 170/100   Pulse: 99    Temp:  99 °F (37.2 °C)   TempSrc:  Oral   SpO2: 95%    Weight: 106.8 kg (235 lb 7.2 oz)    Height: 5' 4" (1.626 m)    PainSc: 0-No pain       Physical Exam  Constitutional:       General: She is not in acute distress.  HENT:      Head: Normocephalic and atraumatic.   Pulmonary:      Effort: Pulmonary effort is normal. No respiratory distress.      Breath sounds: Normal breath sounds. No wheezing.   Abdominal:      General: There is no distension.      Tenderness: There is no abdominal tenderness. "   Lymphadenopathy:      Cervical: No cervical adenopathy.   Neurological:      Mental Status: She is alert and oriented to person, place, and time.              Assessment and Plan     1. COVID  -     nirmatrelvir-ritonavir 300 mg (150 mg x 2)-100 mg copackaged tablets (EUA); Take 3 tablets by mouth 2 (two) times daily for 5 days. Each dose contains 2 nirmatrelvir (pink tablets) and 1 ritonavir (white tablet). Take all 3 tablets together  Dispense: 30 tablet; Refill: 0    2. Sinus congestion  -     POCT COVID-19 Rapid Screening  -     POCT Influenza A/B Molecular    3. Cough, unspecified type  -     POCT COVID-19 Rapid Screening  -     POCT Influenza A/B Molecular      Flu negative  COVID positive  Continue OTC medications  Start Paxlovid  Tylenol as needed for fever  Stay hydrated       Appt with Ronit in 2 weeks for blood pressure     Appt with Cardiology in March         30 minutes of total time spent on the encounter, which includes face to face time and non-face to face time preparing to see the patient (eg, review of tests), Obtaining and/or reviewing separately obtained history, Documenting clinical information in the electronic or other health record, Independently interpreting results (not separately reported) and communicating results to the patient/family/caregiver, or Care coordination (not separately reported).      Ronit Clements PA-C  Family Practice/Internal Medicine   Office Phone: [phone]

## 2025-02-19 DIAGNOSIS — Z78.0 MENOPAUSE: ICD-10-CM

## 2025-02-26 ENCOUNTER — OFFICE VISIT (OUTPATIENT)
Dept: FAMILY MEDICINE | Facility: CLINIC | Age: 65
End: 2025-02-26
Payer: MEDICARE

## 2025-02-26 VITALS
SYSTOLIC BLOOD PRESSURE: 156 MMHG | DIASTOLIC BLOOD PRESSURE: 94 MMHG | WEIGHT: 236.75 LBS | HEIGHT: 64 IN | HEART RATE: 104 BPM | BODY MASS INDEX: 40.42 KG/M2 | OXYGEN SATURATION: 96 %

## 2025-02-26 DIAGNOSIS — I48.0 PAROXYSMAL ATRIAL FIBRILLATION: ICD-10-CM

## 2025-02-26 DIAGNOSIS — I10 ESSENTIAL HYPERTENSION: Primary | ICD-10-CM

## 2025-02-26 DIAGNOSIS — R05.9 COUGH, UNSPECIFIED TYPE: ICD-10-CM

## 2025-02-26 DIAGNOSIS — M54.16 LUMBAR RADICULOPATHY: ICD-10-CM

## 2025-02-26 DIAGNOSIS — M54.31 SCIATIC PAIN, RIGHT: ICD-10-CM

## 2025-02-26 DIAGNOSIS — H10.9 BACTERIAL CONJUNCTIVITIS: ICD-10-CM

## 2025-02-26 PROCEDURE — 3080F DIAST BP >= 90 MM HG: CPT | Mod: CPTII,S$GLB,,

## 2025-02-26 PROCEDURE — 1159F MED LIST DOCD IN RCRD: CPT | Mod: CPTII,S$GLB,,

## 2025-02-26 PROCEDURE — 3288F FALL RISK ASSESSMENT DOCD: CPT | Mod: CPTII,S$GLB,,

## 2025-02-26 PROCEDURE — 3008F BODY MASS INDEX DOCD: CPT | Mod: CPTII,S$GLB,,

## 2025-02-26 PROCEDURE — 4010F ACE/ARB THERAPY RXD/TAKEN: CPT | Mod: CPTII,S$GLB,,

## 2025-02-26 PROCEDURE — 99999 PR PBB SHADOW E&M-EST. PATIENT-LVL V: CPT | Mod: PBBFAC,,,

## 2025-02-26 PROCEDURE — G2211 COMPLEX E/M VISIT ADD ON: HCPCS | Mod: S$GLB,,,

## 2025-02-26 PROCEDURE — 3077F SYST BP >= 140 MM HG: CPT | Mod: CPTII,S$GLB,,

## 2025-02-26 PROCEDURE — 3066F NEPHROPATHY DOC TX: CPT | Mod: CPTII,S$GLB,,

## 2025-02-26 PROCEDURE — 99215 OFFICE O/P EST HI 40 MIN: CPT | Mod: S$GLB,,,

## 2025-02-26 PROCEDURE — 3061F NEG MICROALBUMINURIA REV: CPT | Mod: CPTII,S$GLB,,

## 2025-02-26 PROCEDURE — 1101F PT FALLS ASSESS-DOCD LE1/YR: CPT | Mod: CPTII,S$GLB,,

## 2025-02-26 PROCEDURE — 3044F HG A1C LEVEL LT 7.0%: CPT | Mod: CPTII,S$GLB,,

## 2025-02-26 PROCEDURE — 1160F RVW MEDS BY RX/DR IN RCRD: CPT | Mod: CPTII,S$GLB,,

## 2025-02-26 RX ORDER — BENZONATATE 100 MG/1
100 CAPSULE ORAL 3 TIMES DAILY PRN
Qty: 30 CAPSULE | Refills: 0 | Status: SHIPPED | OUTPATIENT
Start: 2025-02-26 | End: 2025-03-08

## 2025-02-26 RX ORDER — AMLODIPINE AND VALSARTAN 5; 320 MG/1; MG/1
1 TABLET ORAL DAILY
Qty: 30 TABLET | Refills: 0 | Status: SHIPPED | OUTPATIENT
Start: 2025-02-26 | End: 2026-02-26

## 2025-02-26 RX ORDER — POLYMYXIN B SULFATE AND TRIMETHOPRIM 1; 10000 MG/ML; [USP'U]/ML
1 SOLUTION OPHTHALMIC EVERY 6 HOURS
Qty: 10 ML | Refills: 0 | Status: SHIPPED | OUTPATIENT
Start: 2025-02-26 | End: 2025-03-05

## 2025-02-26 RX ORDER — PREGABALIN 100 MG/1
200 CAPSULE ORAL DAILY
COMMUNITY

## 2025-02-26 RX ORDER — DICLOFENAC SODIUM 10 MG/G
2 GEL TOPICAL 2 TIMES DAILY
Qty: 150 G | Refills: 1 | Status: SHIPPED | OUTPATIENT
Start: 2025-02-26

## 2025-02-26 RX ORDER — LIDOCAINE 50 MG/G
1 PATCH TOPICAL DAILY
Qty: 14 PATCH | Refills: 0 | Status: SHIPPED | OUTPATIENT
Start: 2025-02-26 | End: 2025-03-12

## 2025-02-26 RX ORDER — METOPROLOL SUCCINATE 100 MG/1
100 TABLET, EXTENDED RELEASE ORAL DAILY
Qty: 30 TABLET | Refills: 0 | Status: SHIPPED | OUTPATIENT
Start: 2025-02-26

## 2025-02-26 NOTE — PROGRESS NOTES
"Subjective     Patient ID: Yeny Hargrove is a 65 y.o. female.    Chief Complaint: Hypertension    66 y/o female with HTN, HLD, DM, asthma, A-fib, JOSSIE, and GERD presents to clinic for follow up    Blood pressure: amlodipine 20 mg daily (this is not how this was prescribed)  and metoprolol 50 mg daily. Blood pressure in clinic is elevated at 156/94.     DM: on metformin     Sciatic pain takes lyrica, gabapentin made her feel crazy. She takes 200 mg lyrica at night. She was seeing PT for this. She has not gone back since she had COVID. States that PT was helping.     Eye: States that she has been having pain in her right eye. She woke this morning with her eye crusted shut.       HPI  Review of Systems   Constitutional:  Negative for fatigue and fever.   Respiratory:  Positive for cough. Negative for shortness of breath and wheezing.    Cardiovascular:  Negative for chest pain, palpitations and leg swelling.   Gastrointestinal:  Negative for diarrhea, nausea and vomiting.   Integumentary:  Negative for rash.   Neurological:  Negative for dizziness, light-headedness and headaches.          Objective     Vitals:    02/26/25 1056   BP: (!) 156/94   Pulse: 104   SpO2: 96%   Weight: 107.4 kg (236 lb 12.4 oz)   Height: 5' 4" (1.626 m)   PainSc:   8   PainLoc: Hip      Physical Exam  Constitutional:       General: She is not in acute distress.     Appearance: She is not toxic-appearing.   HENT:      Head: Normocephalic and atraumatic.   Eyes:      General:         Right eye: No discharge.         Left eye: No discharge.   Cardiovascular:      Rate and Rhythm: Regular rhythm. Tachycardia present.   Pulmonary:      Effort: Pulmonary effort is normal. No respiratory distress.      Breath sounds: Normal breath sounds. No wheezing.   Abdominal:      General: There is no distension.      Tenderness: There is no abdominal tenderness.   Musculoskeletal:      Right lower leg: No edema.      Left lower leg: No edema.   Lymphadenopathy: "      Cervical: No cervical adenopathy.   Neurological:      Mental Status: She is alert and oriented to person, place, and time.              Assessment and Plan     1. Essential hypertension  -     amlodipine-valsartan (EXFORGE) 5-320 mg per tablet; Take 1 tablet by mouth once daily.  Dispense: 30 tablet; Refill: 0  -     metoprolol succinate (TOPROL-XL) 100 MG 24 hr tablet; Take 1 tablet (100 mg total) by mouth once daily.  Dispense: 30 tablet; Refill: 0    2. Lumbar radiculopathy  -     MRI Lumbar Spine Without Contrast; Future; Expected date: 02/26/2025    3. Sciatic pain, right  -     LIDOcaine (LIDODERM) 5 %; Place 1 patch onto the skin once daily. Remove & Discard patch within 12 hours or as directed by MD for 14 days  Dispense: 14 patch; Refill: 0  -     diclofenac sodium (VOLTAREN ARTHRITIS PAIN) 1 % Gel; Apply 2 g topically 2 (two) times daily.  Dispense: 150 g; Refill: 1    4. Cough, unspecified type  -     benzonatate (TESSALON) 100 MG capsule; Take 1 capsule (100 mg total) by mouth 3 (three) times daily as needed for Cough.  Dispense: 30 capsule; Refill: 0    5. Bacterial conjunctivitis  -     polymyxin B sulf-trimethoprim (POLYTRIM) 10,000 unit- 1 mg/mL Drop; Place 1 drop into both eyes every 6 (six) hours. for 7 days  Dispense: 10 mL; Refill: 0    6. Paroxysmal atrial fibrillation  -     metoprolol succinate (TOPROL-XL) 100 MG 24 hr tablet; Take 1 tablet (100 mg total) by mouth once daily.  Dispense: 30 tablet; Refill: 0      Sciatic pain: Start voltaren gel, lidocaine patches, and stretches. Told pt to call PT to get rescheduled.  MRI ordered    Eye: start drops    HTN:  Stop amlodopine            Start Exforge             Increase metoprolol to 100 mg daily  She has an appt with cardiology next week. Message sent to Dr. Altman regarding medications     Cough: Start tessalon     Appt with Cardiology nest week   Appt with Ronit in 4 weeks for blood pressure         40 minutes of total time spent on  the encounter, which includes face to face time and non-face to face time preparing to see the patient (eg, review of tests), Obtaining and/or reviewing separately obtained history, Documenting clinical information in the electronic or other health record, Independently interpreting results (not separately reported) and communicating results to the patient/family/caregiver, or Care coordination (not separately reported).      Ronit Clements PA-C  Family Practice/Internal Medicine   Office Phone: [phone]

## 2025-02-26 NOTE — PATIENT INSTRUCTIONS
Sciatic pain: Start voltaren gel, lidocaine patches, and stretches. Call PT to get rescheduled  MRI ordered    Eye: start drops    HTN:  Stop amlodopine            Start Exforge             Increase metoprolol to 100 mg daily      Cough: Start tessalon     Appt with Cardiology   Appt with Ronit in 4 weeks for blood pressure

## 2025-02-26 NOTE — Clinical Note
I saw Ms Saini in clinic today. Her blood pressure was elevated. She has been taking her medications incorrectly. She was taking 2- 10 mg tablets of amlodipine and 50 mg of metoprolol. Blood pressure was 156/94 and 160/100. I changed her medications to metoprolol 100 mg and exforge 5/320. I went over her medications and dosages with her and explained how she was taking this incorrectly. She is coming to see you next week.  Ronit

## 2025-03-05 ENCOUNTER — TELEPHONE (OUTPATIENT)
Dept: CARDIOLOGY | Facility: CLINIC | Age: 65
End: 2025-03-05

## 2025-03-05 ENCOUNTER — OFFICE VISIT (OUTPATIENT)
Dept: CARDIOLOGY | Facility: CLINIC | Age: 65
End: 2025-03-05
Payer: MEDICARE

## 2025-03-05 ENCOUNTER — TELEPHONE (OUTPATIENT)
Dept: ELECTROPHYSIOLOGY | Facility: CLINIC | Age: 65
End: 2025-03-05
Payer: MEDICARE

## 2025-03-05 VITALS
DIASTOLIC BLOOD PRESSURE: 90 MMHG | HEIGHT: 64 IN | HEART RATE: 91 BPM | SYSTOLIC BLOOD PRESSURE: 150 MMHG | WEIGHT: 234.44 LBS | BODY MASS INDEX: 40.02 KG/M2 | OXYGEN SATURATION: 96 %

## 2025-03-05 DIAGNOSIS — E11.9 DIABETES MELLITUS WITHOUT COMPLICATION: ICD-10-CM

## 2025-03-05 DIAGNOSIS — I48.91 ATRIAL FIBRILLATION, UNSPECIFIED TYPE: ICD-10-CM

## 2025-03-05 DIAGNOSIS — E11.59 HYPERTENSION ASSOCIATED WITH DIABETES: ICD-10-CM

## 2025-03-05 DIAGNOSIS — I15.2 HYPERTENSION ASSOCIATED WITH DIABETES: ICD-10-CM

## 2025-03-05 DIAGNOSIS — R93.1 ABNORMAL ECHOCARDIOGRAM: ICD-10-CM

## 2025-03-05 DIAGNOSIS — I77.819 AORTIC DILATATION: Primary | ICD-10-CM

## 2025-03-05 DIAGNOSIS — E78.5 HYPERLIPIDEMIA ASSOCIATED WITH TYPE 2 DIABETES MELLITUS: ICD-10-CM

## 2025-03-05 DIAGNOSIS — E11.69 HYPERLIPIDEMIA ASSOCIATED WITH TYPE 2 DIABETES MELLITUS: ICD-10-CM

## 2025-03-05 PROCEDURE — 99999 PR PBB SHADOW E&M-EST. PATIENT-LVL III: CPT | Mod: PBBFAC,,, | Performed by: INTERNAL MEDICINE

## 2025-03-05 PROCEDURE — 3044F HG A1C LEVEL LT 7.0%: CPT | Mod: CPTII,S$GLB,, | Performed by: INTERNAL MEDICINE

## 2025-03-05 PROCEDURE — 3077F SYST BP >= 140 MM HG: CPT | Mod: CPTII,S$GLB,, | Performed by: INTERNAL MEDICINE

## 2025-03-05 PROCEDURE — 3066F NEPHROPATHY DOC TX: CPT | Mod: CPTII,S$GLB,, | Performed by: INTERNAL MEDICINE

## 2025-03-05 PROCEDURE — 3008F BODY MASS INDEX DOCD: CPT | Mod: CPTII,S$GLB,, | Performed by: INTERNAL MEDICINE

## 2025-03-05 PROCEDURE — 3061F NEG MICROALBUMINURIA REV: CPT | Mod: CPTII,S$GLB,, | Performed by: INTERNAL MEDICINE

## 2025-03-05 PROCEDURE — 3080F DIAST BP >= 90 MM HG: CPT | Mod: CPTII,S$GLB,, | Performed by: INTERNAL MEDICINE

## 2025-03-05 PROCEDURE — 99214 OFFICE O/P EST MOD 30 MIN: CPT | Mod: S$GLB,,, | Performed by: INTERNAL MEDICINE

## 2025-03-05 PROCEDURE — 1101F PT FALLS ASSESS-DOCD LE1/YR: CPT | Mod: CPTII,S$GLB,, | Performed by: INTERNAL MEDICINE

## 2025-03-05 PROCEDURE — 4010F ACE/ARB THERAPY RXD/TAKEN: CPT | Mod: CPTII,S$GLB,, | Performed by: INTERNAL MEDICINE

## 2025-03-05 PROCEDURE — G2211 COMPLEX E/M VISIT ADD ON: HCPCS | Mod: S$GLB,,, | Performed by: INTERNAL MEDICINE

## 2025-03-05 PROCEDURE — 3288F FALL RISK ASSESSMENT DOCD: CPT | Mod: CPTII,S$GLB,, | Performed by: INTERNAL MEDICINE

## 2025-03-05 NOTE — TELEPHONE ENCOUNTER
Spoke with Catalina, pharmacist at [facility].  Verified per patient's chart that patient should be on Xarelto.      ----- Message from Claribel sent at 3/5/2025  1:17 PM CST -----  Type:  Pharmacy Calling to Clarify an RXName of Caller:Nehemias Name:[facility] [address] [place] [address] [address]:rivaroxaban (XARELTO) 20 mg TabWhat do they need to clarify?:received Xarelto and Elliquis at the same time, which one is the pt suppose to be taking.Best Call Back Number:Phone: [phone] Fax: [phone] Information:   Admission

## 2025-03-07 ENCOUNTER — OFFICE VISIT (OUTPATIENT)
Dept: CARDIOLOGY | Facility: CLINIC | Age: 65
End: 2025-03-07
Payer: MEDICARE

## 2025-03-07 VITALS
HEART RATE: 96 BPM | DIASTOLIC BLOOD PRESSURE: 97 MMHG | WEIGHT: 234 LBS | BODY MASS INDEX: 39.95 KG/M2 | SYSTOLIC BLOOD PRESSURE: 138 MMHG | HEIGHT: 64 IN

## 2025-03-07 DIAGNOSIS — E11.59 HYPERTENSION ASSOCIATED WITH DIABETES: ICD-10-CM

## 2025-03-07 DIAGNOSIS — E66.01 MORBID OBESITY: ICD-10-CM

## 2025-03-07 DIAGNOSIS — I48.0 PAROXYSMAL ATRIAL FIBRILLATION: Primary | ICD-10-CM

## 2025-03-07 DIAGNOSIS — I15.2 HYPERTENSION ASSOCIATED WITH DIABETES: ICD-10-CM

## 2025-03-07 DIAGNOSIS — E11.9 TYPE 2 DIABETES MELLITUS WITHOUT COMPLICATION, WITHOUT LONG-TERM CURRENT USE OF INSULIN: ICD-10-CM

## 2025-03-07 PROCEDURE — 99999 PR PBB SHADOW E&M-EST. PATIENT-LVL IV: CPT | Mod: PBBFAC,,, | Performed by: INTERNAL MEDICINE

## 2025-03-07 NOTE — PROGRESS NOTES
Subjective   Patient ID:  Yeny Hargrove is a 65 y.o. female who presents for evaluation of Atrial Fibrillation    Referring Physician: Royal Iniguez,     HPI  I had the pleasure of seeing Mrs Hargrove today in our electrophysiology clinic in consultation for her atrial fibrillation. As you are aware she is a pleasant 65 year-old woman with hypertension, type 2 DM, breast cancer, obesity and paroxysmal atrial fibrillation. First presented to the hospital with palpitations in 3/2022. Underwent DCCV with Dr. Benavidez. Was started on xarelto. Had another paroxysm in December of 2022. At some point she stopped it due to cost. Returned to the ER in February 2025 with shortness of breath. CTA PE protocol was negative. Rhythm was sinus tachycardia. Discharged from ER. 1 week later diagnosed with COVID. She is referred to discuss her AF. She resumed xarelto. She reports no AF symptoms since 12/2022.    I reviewed available ECGs in [facility] and my personal interpretation summary is either sinus rhythm or AF with RVR.    Review of Systems   Constitutional: Negative for fever and malaise/fatigue.   HENT:  Negative for congestion and sore throat.    Eyes:  Negative for blurred vision and visual disturbance.   Cardiovascular:  Negative for chest pain, dyspnea on exertion, irregular heartbeat, near-syncope, palpitations and syncope.   Respiratory:  Positive for cough. Negative for shortness of breath.    Hematologic/Lymphatic: Negative for bleeding problem. Does not bruise/bleed easily.   Skin: Negative.    Musculoskeletal: Negative.    Gastrointestinal:  Negative for bloating, abdominal pain, hematochezia and melena.   Neurological:  Negative for focal weakness and weakness.   Psychiatric/Behavioral: Negative.            Objective     Physical Exam  Vitals reviewed.   Constitutional:       General: She is not in acute distress.     Appearance: She is well-developed. She is not diaphoretic.   HENT:      Head: Normocephalic and  atraumatic.   Eyes:      General:         Right eye: No discharge.         Left eye: No discharge.      Conjunctiva/sclera: Conjunctivae normal.   Cardiovascular:      Rate and Rhythm: Normal rate and regular rhythm.      Heart sounds: No murmur heard.     No friction rub. No gallop.   Pulmonary:      Effort: Pulmonary effort is normal. No respiratory distress.      Breath sounds: Normal breath sounds. No wheezing or rales.   Abdominal:      General: Bowel sounds are normal. There is no distension.      Palpations: Abdomen is soft.      Tenderness: There is no abdominal tenderness.   Musculoskeletal:      Cervical back: Neck supple.   Skin:     General: Skin is warm and dry.   Neurological:      Mental Status: She is alert and oriented to person, place, and time.   Psychiatric:         Behavior: Behavior normal.         Thought Content: Thought content normal.         Judgment: Judgment normal.            Assessment and Plan     1. Paroxysmal atrial fibrillation    2. Hypertension associated with diabetes    3. Type 2 diabetes mellitus without complication, without long-term current use of insulin    4. Morbid obesity        Plan:  In summary, Mrs Hargrove is a pleasant 65 year-old woman with hypertension (on meds), type 2 DM (on meds), breast cancer, obesity and paroxysmal atrial fibrillation. I had a long discussion with the patient about the pathophysiology and risks of atrial fibrillation and its basic pathophysiology, including its health implications and treatment options. Specifically, I addressed the need for CVA (stroke) prophylaxis with aspirin versus oral anticoagulation (warfarin vs DOACs, discussed bleeding risks, and need to come to the ER for any head trauma for CT scanning even if asymptomatic). [id number] score is 4 and oral anticoagulation is indicated. I also discussed the goal to reduce symptomatic arrhythmic episodes by pharmacologic and/or procedural methods and utilizing a rhythm versus a rate  control strategy. Discussed risk factor modification including good BP control, glucose control and attempts at weight reduction. No known AF since 12/2022. Needs to be on anticoagulation. Can try dabigatran 150mg bid if xarelto continues to be too expensive. Alternative is warfarin with goal INR 2-3. She does not desire warfarin.    RTC in 6 months.    Thank you for allowing me to participate in the care of this patient. Please do not hesitate to call me with any questions or concerns.    Jermaine Vera MD, PhD  Cardiac Electrophysiology

## 2025-03-10 DIAGNOSIS — I48.0 PAROXYSMAL ATRIAL FIBRILLATION: Primary | ICD-10-CM

## 2025-03-13 ENCOUNTER — HOSPITAL ENCOUNTER (OUTPATIENT)
Dept: RADIOLOGY | Facility: HOSPITAL | Age: 65
Discharge: HOME | End: 2025-03-13
Payer: MEDICARE

## 2025-03-13 DIAGNOSIS — M54.16 LUMBAR RADICULOPATHY: ICD-10-CM

## 2025-03-20 ENCOUNTER — HOSPITAL ENCOUNTER (OUTPATIENT)
Dept: RADIOLOGY | Facility: HOSPITAL | Age: 65
Discharge: HOME | End: 2025-03-20
Payer: MEDICARE

## 2025-03-20 PROCEDURE — 72148 MRI LUMBAR SPINE W/O DYE: CPT | Mod: 26,,, | Performed by: RADIOLOGY

## 2025-03-20 PROCEDURE — 72148 MRI LUMBAR SPINE W/O DYE: CPT | Mod: TC

## 2025-03-21 ENCOUNTER — RESULTS FOLLOW-UP (OUTPATIENT)
Dept: FAMILY MEDICINE | Facility: CLINIC | Age: 65
End: 2025-03-21

## 2025-03-31 ENCOUNTER — OFFICE VISIT (OUTPATIENT)
Dept: FAMILY MEDICINE | Facility: CLINIC | Age: 65
End: 2025-03-31
Payer: MEDICARE

## 2025-03-31 VITALS
HEIGHT: 64 IN | OXYGEN SATURATION: 95 % | DIASTOLIC BLOOD PRESSURE: 98 MMHG | HEART RATE: 75 BPM | WEIGHT: 240.75 LBS | BODY MASS INDEX: 41.1 KG/M2 | SYSTOLIC BLOOD PRESSURE: 156 MMHG

## 2025-03-31 DIAGNOSIS — M54.16 LUMBAR RADICULOPATHY: ICD-10-CM

## 2025-03-31 DIAGNOSIS — J45.901 EXACERBATION OF ASTHMA, UNSPECIFIED ASTHMA SEVERITY, UNSPECIFIED WHETHER PERSISTENT: ICD-10-CM

## 2025-03-31 DIAGNOSIS — J45.40 MODERATE PERSISTENT ASTHMA, UNSPECIFIED WHETHER COMPLICATED: ICD-10-CM

## 2025-03-31 DIAGNOSIS — I10 ESSENTIAL HYPERTENSION: Primary | ICD-10-CM

## 2025-03-31 PROCEDURE — 99215 OFFICE O/P EST HI 40 MIN: CPT | Mod: S$GLB,,,

## 2025-03-31 PROCEDURE — 3008F BODY MASS INDEX DOCD: CPT | Mod: CPTII,S$GLB,,

## 2025-03-31 PROCEDURE — 3044F HG A1C LEVEL LT 7.0%: CPT | Mod: CPTII,S$GLB,,

## 2025-03-31 PROCEDURE — 99999 PR PBB SHADOW E&M-EST. PATIENT-LVL V: CPT | Mod: PBBFAC,,,

## 2025-03-31 PROCEDURE — 3061F NEG MICROALBUMINURIA REV: CPT | Mod: CPTII,S$GLB,,

## 2025-03-31 PROCEDURE — 1159F MED LIST DOCD IN RCRD: CPT | Mod: CPTII,S$GLB,,

## 2025-03-31 PROCEDURE — 1101F PT FALLS ASSESS-DOCD LE1/YR: CPT | Mod: CPTII,S$GLB,,

## 2025-03-31 PROCEDURE — 4010F ACE/ARB THERAPY RXD/TAKEN: CPT | Mod: CPTII,S$GLB,,

## 2025-03-31 PROCEDURE — 3077F SYST BP >= 140 MM HG: CPT | Mod: CPTII,S$GLB,,

## 2025-03-31 PROCEDURE — 3288F FALL RISK ASSESSMENT DOCD: CPT | Mod: CPTII,S$GLB,,

## 2025-03-31 PROCEDURE — 1160F RVW MEDS BY RX/DR IN RCRD: CPT | Mod: CPTII,S$GLB,,

## 2025-03-31 PROCEDURE — 3066F NEPHROPATHY DOC TX: CPT | Mod: CPTII,S$GLB,,

## 2025-03-31 PROCEDURE — 3080F DIAST BP >= 90 MM HG: CPT | Mod: CPTII,S$GLB,,

## 2025-03-31 RX ORDER — AMLODIPINE AND VALSARTAN 10; 320 MG/1; MG/1
1 TABLET ORAL DAILY
Qty: 90 TABLET | Refills: 3 | Status: SHIPPED | OUTPATIENT
Start: 2025-03-31 | End: 2026-03-31

## 2025-03-31 RX ORDER — PREGABALIN 50 MG/1
50 CAPSULE ORAL 2 TIMES DAILY
Qty: 60 CAPSULE | Refills: 0 | Status: SHIPPED | OUTPATIENT
Start: 2025-03-31 | End: 2025-04-30

## 2025-03-31 RX ORDER — ALBUTEROL SULFATE 90 UG/1
2 INHALANT RESPIRATORY (INHALATION) EVERY 6 HOURS PRN
Qty: 6.7 G | Refills: 3 | Status: SHIPPED | OUTPATIENT
Start: 2025-03-31

## 2025-03-31 RX ORDER — PREGABALIN 50 MG/1
50 CAPSULE ORAL 3 TIMES DAILY
Qty: 546 CAPSULE | Refills: 0 | Status: SHIPPED | OUTPATIENT
Start: 2025-03-31 | End: 2025-03-31

## 2025-03-31 RX ORDER — MONTELUKAST SODIUM 10 MG/1
10 TABLET ORAL NIGHTLY
Qty: 30 TABLET | Refills: 0 | Status: SHIPPED | OUTPATIENT
Start: 2025-03-31 | End: 2025-04-30

## 2025-03-31 RX ORDER — PREDNISONE 50 MG/1
50 TABLET ORAL DAILY
Qty: 5 TABLET | Refills: 0 | Status: SHIPPED | OUTPATIENT
Start: 2025-03-31 | End: 2025-04-05

## 2025-03-31 NOTE — PATIENT INSTRUCTIONS
Continue lidocaine patch  Continue voltaren  Start lyrica 150 mg AM and 150 mg PM   Appt with Pain management     Continue metoprolol 100 mg  Increase amlodipine -valsartan to 10/320 daily   Continue taking Bp daily     Continue statin  Continue xarelto     Albuterol refilled  Start singulair  Start prednisone     Appt with Ronit in 1 week for blood pressure   Appt with Dr. Hira MORRIS, Labs prior in [place]

## 2025-03-31 NOTE — PROGRESS NOTES
"Subjective     Patient ID: Yeny Hargrove is a 65 y.o. female.    Chief Complaint: blood pressure      HPI    Blood pressure at home has been 140/90s. Clinic 158/98 and 146/98  Denies CP, blurred vision, HA    Back pain: Went over MRI results  Voltaren and lidocaine patches do help  Would like to see pain management       Having SOB and cough. Has not used inhaler in years. Uses nebulizer nightly.     Review of Systems   Constitutional:  Negative for fatigue and fever.   Eyes:  Negative for visual disturbance.   Respiratory:  Positive for cough and shortness of breath. Negative for wheezing.    Gastrointestinal:  Negative for diarrhea, nausea and vomiting.   Musculoskeletal:  Positive for back pain.   Neurological:  Negative for dizziness, light-headedness and headaches.          Objective     Vitals:    03/31/25 1119   BP: (!) 156/98   Pulse: 75   SpO2: 95%   Weight: 109.2 kg (240 lb 11.9 oz)   Height: 5' 4" (1.626 m)   PainSc:   7   PainLoc: Hip      Physical Exam  Constitutional:       General: She is not in acute distress.     Appearance: She is not toxic-appearing.   HENT:      Head: Normocephalic and atraumatic.   Cardiovascular:      Rate and Rhythm: Normal rate and regular rhythm.   Pulmonary:      Effort: Pulmonary effort is normal. No respiratory distress.      Breath sounds: No wheezing or rales.      Comments: Diminished breath sound bilateral lung bases   Musculoskeletal:      Right lower leg: No edema.      Left lower leg: No edema.   Lymphadenopathy:      Cervical: No cervical adenopathy.   Neurological:      Mental Status: She is alert and oriented to person, place, and time.              Assessment and Plan     1. Essential hypertension  -     amlodipine-valsartan (EXFORGE)  mg per tablet; Take 1 tablet by mouth once daily.  Dispense: 90 tablet; Refill: 3    2. Lumbar radiculopathy  -     Ambulatory referral/consult to Pain Clinic; Future; Expected date: 04/07/2025  -     pregabalin (LYRICA) 50 " MG capsule; Take 1 capsule (50 mg total) by mouth 3 (three) times daily.  Dispense: 546 capsule; Refill: 0    3. Moderate persistent asthma, unspecified whether complicated  -     albuterol (PROVENTIL/VENTOLIN HFA) 90 mcg/actuation inhaler; Inhale 2 puffs into the lungs every 6 (six) hours as needed for Wheezing or Shortness of Breath. Rescue  Dispense: 6.7 g; Refill: 3    4. Exacerbation of asthma, unspecified asthma severity, unspecified whether persistent  -     predniSONE (DELTASONE) 50 MG Tab; Take 1 tablet (50 mg total) by mouth once daily. for 5 days  Dispense: 5 tablet; Refill: 0  -     montelukast (SINGULAIR) 10 mg tablet; Take 1 tablet (10 mg total) by mouth every evening.  Dispense: 30 tablet; Refill: 0      HTN: blood pressure elevated in clinic and at home. Will increase exforge to 10/320.     Back pain: Voltaren and lido patches helping. Will increase lyrica to 150 mg daily. Ref to pain management scheduled    Asthma: prednisone 50 mg. Albuterol inhaler ordered. Starting Singulair       Continue lidocaine patch  Continue voltaren  Start lyrica 150 mg AM and 150 mg PM   Appt with Pain management     Continue metoprolol 100 mg  Increase amlodipine -valsartan to 10/320 daily   Continue taking Bp daily     Continue statin  Continue xarelto     Albuterol refilled  Start prednisone     Appt with Ronit in 1 week for blood pressure     Appt with Dr. Hira MORRIS, Labs prior in [place]       40 minutes of total time spent on the encounter, which includes face to face time and non-face to face time preparing to see the patient (eg, review of tests), Obtaining and/or reviewing separately obtained history, Documenting clinical information in the electronic or other health record, Independently interpreting results (not separately reported) and communicating results to the patient/family/caregiver, or Care coordination (not separately reported).      Ronit Clements PA-C  Family Practice/Internal Medicine    Office Phone: [phone]

## 2025-04-02 ENCOUNTER — PATIENT MESSAGE (OUTPATIENT)
Dept: ADMINISTRATIVE | Facility: HOSPITAL | Age: 65
End: 2025-04-02
Payer: MEDICARE

## 2025-04-09 ENCOUNTER — OFFICE VISIT (OUTPATIENT)
Dept: FAMILY MEDICINE | Facility: CLINIC | Age: 65
End: 2025-04-09
Payer: MEDICARE

## 2025-04-09 ENCOUNTER — TELEPHONE (OUTPATIENT)
Dept: FAMILY MEDICINE | Facility: CLINIC | Age: 65
End: 2025-04-09
Payer: MEDICARE

## 2025-04-09 VITALS
WEIGHT: 240.5 LBS | HEIGHT: 64 IN | BODY MASS INDEX: 41.06 KG/M2 | HEART RATE: 66 BPM | OXYGEN SATURATION: 96 % | DIASTOLIC BLOOD PRESSURE: 92 MMHG | SYSTOLIC BLOOD PRESSURE: 144 MMHG

## 2025-04-09 DIAGNOSIS — J32.9 VIRAL SINUSITIS: ICD-10-CM

## 2025-04-09 DIAGNOSIS — R09.81 SINUS CONGESTION: ICD-10-CM

## 2025-04-09 DIAGNOSIS — E11.9 TYPE 2 DIABETES MELLITUS WITHOUT COMPLICATION, WITHOUT LONG-TERM CURRENT USE OF INSULIN: ICD-10-CM

## 2025-04-09 DIAGNOSIS — I48.0 PAROXYSMAL ATRIAL FIBRILLATION: ICD-10-CM

## 2025-04-09 DIAGNOSIS — B97.89 VIRAL SINUSITIS: ICD-10-CM

## 2025-04-09 DIAGNOSIS — M54.16 LUMBAR RADICULOPATHY: ICD-10-CM

## 2025-04-09 DIAGNOSIS — I10 ESSENTIAL HYPERTENSION: Primary | ICD-10-CM

## 2025-04-09 LAB
CTP QC/QA: YES
CTP QC/QA: YES
GLUCOSE SERPL-MCNC: 104 MG/DL (ref 70–110)
POC MOLECULAR INFLUENZA A AGN: NEGATIVE
POC MOLECULAR INFLUENZA B AGN: NEGATIVE
SARS-COV-2 RDRP RESP QL NAA+PROBE: NEGATIVE

## 2025-04-09 PROCEDURE — 82962 GLUCOSE BLOOD TEST: CPT | Mod: S$GLB,,,

## 2025-04-09 PROCEDURE — 87502 INFLUENZA DNA AMP PROBE: CPT | Mod: QW,S$GLB,,

## 2025-04-09 PROCEDURE — 99999 PR PBB SHADOW E&M-EST. PATIENT-LVL V: CPT | Mod: PBBFAC,,,

## 2025-04-09 RX ORDER — PREGABALIN 100 MG/1
100 CAPSULE ORAL 2 TIMES DAILY
Qty: 60 CAPSULE | Refills: 1 | Status: SHIPPED | OUTPATIENT
Start: 2025-04-09 | End: 2025-06-08

## 2025-04-09 RX ORDER — METOPROLOL SUCCINATE 100 MG/1
100 TABLET, EXTENDED RELEASE ORAL DAILY
Qty: 90 TABLET | Refills: 3 | Status: SHIPPED | OUTPATIENT
Start: 2025-04-09 | End: 2026-04-09

## 2025-04-09 RX ORDER — AZELASTINE 1 MG/ML
1 SPRAY, METERED NASAL 2 TIMES DAILY
Qty: 30 ML | Refills: 0 | Status: SHIPPED | OUTPATIENT
Start: 2025-04-09 | End: 2026-04-09

## 2025-04-09 RX ORDER — IPRATROPIUM BROMIDE 21 UG/1
2 SPRAY, METERED NASAL 2 TIMES DAILY
Qty: 30 ML | Refills: 0 | Status: SHIPPED | OUTPATIENT
Start: 2025-04-09

## 2025-04-09 RX ORDER — CETIRIZINE HYDROCHLORIDE 10 MG/1
10 TABLET ORAL DAILY
Qty: 30 TABLET | Refills: 0 | Status: SHIPPED | OUTPATIENT
Start: 2025-04-09 | End: 2025-05-09

## 2025-04-09 NOTE — PATIENT INSTRUCTIONS
Continue metoprolol 100 mg  Continue amlodipine -valsartan to 10/320 daily   Continue taking Bp daily. Write down on log   Bring blood pressure machine to appt!!     Continue statin  Continue xarelto     Continue lidocaine patch  Continue voltaren  Continue  lyrica 150 mg AM and 150 mg PM   Appt with Pain management     COVID and Flu negative  Start Flonase, astelin, and ipratropium nasal sprays 2 times a day  Wait 10 min between each  Start certrizine daily       Appt with Ronit May 7th

## 2025-04-09 NOTE — TELEPHONE ENCOUNTER
----- Message from Katiana sent at 4/9/2025 11:47 AM CDT -----  Type:  Pharmacy Calling to Clarify an RXPharmacy [facility] [id number] [facility] [address]Inxdm: [phone] Fax: [phone]: Not open 24 hoursPrescription Name:pregabalin (LYRICA) 50 MG capsuleWhat do they need to clarify?:strength confirmation 50 or 100 mgBest Call Back Number:[phone] Information:

## 2025-04-09 NOTE — PROGRESS NOTES
"Subjective     Patient ID: Yeny Hargrove is a 65 y.o. female.    Chief Complaint: Blood Pressure Check      HPI  66 y/o female with DM, HTN, HLD, Afib, JOSSIE, GERD, and Vit D def presents to clinic for follow up.    Here for blood pressure follow up  Exforge was increased last visit. Today in clinic is still elevated. She states that her hip hurts and she isn't feeling well  States that her blood pressure at home is stable. It gets elevated when she comes in   Take exforge and metoprolol 100 mg     Denies CP, blurred vision, HA       Has had sinus congestion for a month now. She has taken OTC sinus medication that has not helped  Sinus congestion, post nasal drip, itchy throat       Review of Systems   Constitutional:  Negative for fatigue and fever.   HENT:  Positive for postnasal drip, rhinorrhea and sinus pressure/congestion. Negative for ear discharge, ear pain, sore throat and trouble swallowing.    Respiratory:  Negative for cough, shortness of breath and wheezing.    Cardiovascular:  Negative for chest pain, palpitations and leg swelling.          Objective     Vitals:    04/09/25 1048 04/09/25 1103   BP: (!) 152/100 (!) 144/92   Pulse: 66    SpO2: 96%    Weight: 109.1 kg (240 lb 8.4 oz)    Height: 5' 4" (1.626 m)    PainSc:   9    PainLoc: Hip       Physical Exam  Constitutional:       General: She is not in acute distress.     Appearance: She is not toxic-appearing.   HENT:      Head: Normocephalic and atraumatic.      Right Ear: Tympanic membrane, ear canal and external ear normal.      Left Ear: Tympanic membrane, ear canal and external ear normal.      Nose: Congestion and rhinorrhea present.      Mouth/Throat:      Pharynx: No oropharyngeal exudate or posterior oropharyngeal erythema.   Eyes:      General:         Right eye: No discharge.         Left eye: No discharge.      Conjunctiva/sclera: Conjunctivae normal.   Cardiovascular:      Rate and Rhythm: Normal rate and regular rhythm.   Pulmonary:      " Effort: Pulmonary effort is normal. No respiratory distress.      Breath sounds: Normal breath sounds. No wheezing.   Abdominal:      General: There is no distension.      Tenderness: There is no abdominal tenderness.   Musculoskeletal:      Right lower leg: No edema.      Left lower leg: No edema.   Lymphadenopathy:      Cervical: No cervical adenopathy.   Skin:     Findings: No rash.   Neurological:      Mental Status: She is alert and oriented to person, place, and time.              Assessment and Plan     1. Essential hypertension  -     metoprolol succinate (TOPROL-XL) 100 MG 24 hr tablet; Take 1 tablet (100 mg total) by mouth once daily.  Dispense: 90 tablet; Refill: 3    2. Paroxysmal atrial fibrillation  -     metoprolol succinate (TOPROL-XL) 100 MG 24 hr tablet; Take 1 tablet (100 mg total) by mouth once daily.  Dispense: 90 tablet; Refill: 3    3. Type 2 diabetes mellitus without complication, without long-term current use of insulin  -     POCT Glucose, Hand-Held Device    4. Viral sinusitis  -     ipratropium (ATROVENT) 21 mcg (0.03 %) nasal spray; 2 sprays by Each Nostril route 2 (two) times daily.  Dispense: 30 mL; Refill: 0  -     azelastine (ASTELIN) 137 mcg (0.1 %) nasal spray; 1 spray (137 mcg total) by Nasal route 2 (two) times daily.  Dispense: 30 mL; Refill: 0  -     cetirizine (ZYRTEC) 10 MG tablet; Take 1 tablet (10 mg total) by mouth once daily.  Dispense: 30 tablet; Refill: 0    5. Sinus congestion  -     POCT COVID-19 Rapid Screening  -     POCT Influenza A/B Molecular    6. Lumbar radiculopathy  -     pregabalin (LYRICA) 100 MG capsule; Take 1 capsule (100 mg total) by mouth 2 (two) times daily.  Dispense: 60 capsule; Refill: 1      HTN: Will have pt take blood pressure at home daily and bring machine and log to next visit. Will continue current regimen at this time.     A fib: Continue metoprolol     Pt having sinus congestion. Will start treatment for viral sinusitis     Continue  metoprolol 100 mg  Continue amlodipine -valsartan to 10/320 daily   Continue taking Bp daily. Write down on log   Bring blood pressure machine to appt!!     Continue statin  Continue xarelto     Continue lidocaine patch  Continue voltaren  Continue  lyrica 150 mg AM and 150 mg PM   Appt with Pain management     COVID and Flu negative  Start Flonase, astelin, and ipratropium nasal sprays 2 times a day  Wait 10 min between each  Start certrizine daily       Appt with Ronit Hewitt 7[facility] for blood pressure follow up     30 minutes of total time spent on the encounter, which includes face to face time and non-face to face time preparing to see the patient (eg, review of tests), Obtaining and/or reviewing separately obtained history, Documenting clinical information in the electronic or other health record, Independently interpreting results (not separately reported) and communicating results to the patient/family/caregiver, or Care coordination (not separately reported).      Ronit Clements PA-C  Family Practice/Internal Medicine   Office Phone: [phone]

## 2025-04-09 NOTE — PROGRESS NOTES
"Subjective     Patient ID: Yeny Hargrove is a 65 y.o. female.    Chief Complaint: Blood Pressure Check      HPI  She is taking       Review of Systems       Objective     Vitals:    04/09/25 1048 04/09/25 1103   BP: (!) 152/100 (!) 144/92   Pulse: 66    SpO2: 96%    Weight: 109.1 kg (240 lb 8.4 oz)    Height: 5' 4" (1.626 m)    PainSc:   9    PainLoc: Hip       Physical Exam         Assessment and Plan     {There are no diagnoses linked to this encounter. (Refresh or delete this SmartLink)}          30 minutes of total time spent on the encounter, which includes face to face time and non-face to face time preparing to see the patient (eg, review of tests), Obtaining and/or reviewing separately obtained history, Documenting clinical information in the electronic or other health record, Independently interpreting results (not separately reported) and communicating results to the patient/family/caregiver, or Care coordination (not separately reported).      Ronit Clements PA-C  Family Practice/Internal Medicine   Office Phone: [phone]     "

## 2025-04-16 NOTE — PROGRESS NOTES
Subjective:   @Patient ID:  Yeny Hargrove is a 65 y.o. female who presents for evaluation of No chief complaint on file.      HPI:      Patient is a 65-year-old female, here to establish care with a cardiologist.  Active medical problems include    - asthma on inhaler therapy  - hypertension on amlodipine/valsartan, Toprol-XL  - hyperlipidemia on atorvastatin 80 mg daily  A1c 6.7  - diabetes A1c 6.7 on metformin and Lyrica  - history of breast cancer on anastrozole  - echocardiogram shows moderate aortic regurgitation, preserved left ventricular ejection fraction  - paroxysmal atrial fibrillation, currently normal sinus rhythm, starting Xarelto today, has had cardioversion done in the past  - ascending aorta dilation noted to be 4.5 cm on CT scan       Main complaint is that of dizziness and exertional shortness of breath.  No chest pain or heaviness.  Doing discussed following up on ascending aortic aneurysm and moderat      Results for orders placed during the hospital encounter of 01/30/25    Echo    Interpretation Summary    Aorta: Aortic root is normal in size measuring 2.86 cm. Ascending aorta is moderately dilated measuring 4.8 cm.    Aortic Valve: There is moderate aortic regurgitation.    Left Ventricle: The left ventricle is normal in size. Mildly increased wall thickness. Normal wall motion. There is normal systolic function. Quantitated ejection fraction is 55%. Global longitudinal strain is -18.0%. Global longitudinal strain is normal. Grade I diastolic dysfunction.    Right Ventricle: Normal right ventricular cavity size. Systolic function is normal.      No results found for this or any previous visit.      No results found for this or any previous visit.      Please document below the medical necessity for continuous telemetry monitoring or discontinue the current order if appropriate.    Current rhythm from flowsheet:               Patient Active Problem List    Diagnosis Date Noted    Morbid  obesity 03/07/2025    Vitamin D deficiency 01/17/2025    Low serum vitamin B12 01/17/2025    Type 2 diabetes mellitus without complication, without long-term current use of insulin 01/16/2025    Other hyperlipidemia 01/16/2025    Essential hypertension 01/16/2025    Hypertension associated with diabetes 01/16/2025    Paroxysmal atrial fibrillation 01/16/2025    Moderate persistent asthma 01/16/2025    Iron deficiency 01/16/2025    Recurrent cancer of left breast 01/16/2025    Malignant neoplasm of upper-inner quadrant of left breast in female, estrogen receptor positive 06/05/2023    Estrogen receptor positive 06/05/2023    Gastroesophageal reflux disease without esophagitis 02/16/2020    Ductal carcinoma in situ (DCIS) of left breast 05/24/2018     May 2018                      LAST HbA1c  Lab Results   Component Value Date    HGBA1C 6.7 (H) 01/16/2025       Lipid panel  Lab Results   Component Value Date    CHOL 233 (H) 01/16/2025    CHOL 134 03/15/2022    CHOL 194 02/21/2022     Lab Results   Component Value Date    HDL 74 01/16/2025    HDL 45 03/15/2022    HDL 50 02/21/2022     Lab Results   Component Value Date    LDLCALC 144.8 01/16/2025    LDLCALC 80.0 03/15/2022    LDLCALC 129.0 02/21/2022     Lab Results   Component Value Date    TRIG 71 01/16/2025    TRIG 45 03/15/2022    TRIG 75 02/21/2022     Lab Results   Component Value Date    CHOLHDL 31.8 01/16/2025    CHOLHDL 33.6 03/15/2022    CHOLHDL 25.8 02/21/2022            Review of Systems   Constitutional: Negative for chills and fever.   HENT:  Negative for hearing loss and nosebleeds.    Eyes:  Negative for blurred vision.   Cardiovascular:  Positive for dyspnea on exertion.        As in HPI    Respiratory:  Negative for cough, hemoptysis and shortness of breath.    Endocrine: Negative for cold intolerance and polyuria.   Hematologic/Lymphatic: Negative for bleeding problem.   Skin:  Negative for itching.   Musculoskeletal:  Negative for falls.    Gastrointestinal:  Negative for abdominal pain and hematochezia.   Genitourinary:  Negative for hematuria.   Neurological:  Negative for dizziness and loss of balance.   Psychiatric/Behavioral:  Negative for altered mental status and depression.        Objective:   Physical Exam  Constitutional:       Appearance: She is well-developed.   HENT:      Head: Normocephalic and atraumatic.   Eyes:      Conjunctiva/sclera: Conjunctivae normal.   Neck:      Vascular: No carotid bruit or JVD.   Cardiovascular:      Rate and Rhythm: Normal rate and regular rhythm.      Pulses:           Carotid pulses are 2+ on the right side and 2+ on the left side.       Radial pulses are 2+ on the right side and 2+ on the left side.      Heart sounds: Murmur heard.      No friction rub. No gallop.   Pulmonary:      Effort: Pulmonary effort is normal. No respiratory distress.      Breath sounds: Normal breath sounds. No stridor. No wheezing.   Abdominal:      General: Abdomen is flat.      Palpations: Abdomen is soft.   Musculoskeletal:      Cervical back: Neck supple.      Right lower leg: No edema.      Left lower leg: No edema.   Skin:     General: Skin is warm and dry.   Neurological:      Mental Status: She is alert and oriented to person, place, and time.   Psychiatric:         Behavior: Behavior normal.         Assessment:     1. Aortic dilatation    2. Abnormal echocardiogram    3. Hypertension associated with diabetes    4. Atrial fibrillation, unspecified type    5. Hyperlipidemia associated with type 2 diabetes mellitus    6. Diabetes mellitus without complication        Plan:     - no symptoms that are concerning for angina.  Recommend repeating CT chest without contrast for assessment of ascending aorta dilatation.  Echocardiogram for assessment of aortic regurgitation.  Euvolemic on examination.  -  A1c 6.7 .  Continue Lipitor.  Repeat lipid panel within 3-6 months.  No established history of coronary artery disease.   Preserved left ventricular ejection fraction.  -  Start Xarelto for history of atrial fibrillation with CHADS-VASc score of at least 4  - follow up with test results    Pertinent cardiac images and EKG reviewed independently.    Continue with current medical plan and lifestyle changes.  Return sooner for concerns or questions. If symptoms persist go to the ED  I have reviewed all pertinent data including patient's medical history in detail and updated the computerized patient record.     Orders Placed This Encounter   Procedures    CT Chest Without Contrast     Standing Status:   Future     Expected Date:   3/5/2025     Expiration Date:   3/5/2026     May the Radiologist modify the order per protocol to meet the clinical needs of the patient?:   Yes    Lipid Panel     Standing Status:   Future     Expected Date:   6/3/2026     Expiration Date:   6/3/2026     Send normal result to authorizing provider's In Basket if patient is active on MyChart::   Yes    Hemoglobin A1C     Standing Status:   Future     Expiration Date:   3/5/2026     Send normal result to authorizing provider's In Basket if patient is active on MyChart::   Yes    Echo     Standing Status:   Future     Expiration Date:   3/5/2026     Release to patient:   Immediate       Follow up as scheduled.     She expressed verbal understanding and agreed with the plan    Patient's Medications   New Prescriptions    AMLODIPINE-VALSARTAN (EXFORGE)  MG PER TABLET    Take 1 tablet by mouth once daily.    AZELASTINE (ASTELIN) 137 MCG (0.1 %) NASAL SPRAY    1 spray (137 mcg total) by Nasal route 2 (two) times daily.    CETIRIZINE (ZYRTEC) 10 MG TABLET    Take 1 tablet (10 mg total) by mouth once daily.    IPRATROPIUM (ATROVENT) 21 MCG (0.03 %) NASAL SPRAY    2 sprays by Each Nostril route 2 (two) times daily.    MONTELUKAST (SINGULAIR) 10 MG TABLET    Take 1 tablet (10 mg total) by mouth every evening.   Previous Medications    ALBUTEROL-IPRATROPIUM (DUO-NEB)  2.5 MG-0.5 MG/3 ML NEBULIZER SOLUTION    Take 3 mLs by nebulization every 6 (six) hours as needed for Wheezing. Rescue    AMITRIPTYLINE (ELAVIL) 150 MG TAB    Take 150 mg by mouth every evening.    ANASTROZOLE (ARIMIDEX) 1 MG TAB    Take 1 tablet (1 mg total) by mouth once daily.    ATORVASTATIN (LIPITOR) 80 MG TABLET    Take 1 tablet (80 mg total) by mouth once daily.    DICLOFENAC SODIUM (VOLTAREN ARTHRITIS PAIN) 1 % GEL    Apply 2 g topically 2 (two) times daily.    FLUTICASONE FUROATE (ARNUITY ELLIPTA) 100 MCG/ACTUATION INHALER    Inhale 1 puff into the lungs once daily. Controller    FLUTICASONE PROPIONATE (FLONASE) 50 MCG/ACTUATION NASAL SPRAY    fluticasone propionate 50 mcg/actuation nasal spray,suspension    METFORMIN (GLUCOPHAGE-XR) 500 MG ER 24HR TABLET    Take 1 tablet (500 mg total) by mouth daily with breakfast.   Modified Medications    Modified Medication Previous Medication    ALBUTEROL (PROVENTIL/VENTOLIN HFA) 90 MCG/ACTUATION INHALER albuterol (PROVENTIL/VENTOLIN HFA) 90 mcg/actuation inhaler       Inhale 2 puffs into the lungs every 6 (six) hours as needed for Wheezing or Shortness of Breath. Rescue    Ventolin HFA 90 mcg/actuation aerosol inhaler   Inhale 2 puffs 4 times a day by inhalation route.    METOPROLOL SUCCINATE (TOPROL-XL) 100 MG 24 HR TABLET metoprolol succinate (TOPROL-XL) 100 MG 24 hr tablet       Take 1 tablet (100 mg total) by mouth once daily.    Take 1 tablet (100 mg total) by mouth once daily.    PREGABALIN (LYRICA) 100 MG CAPSULE pregabalin (LYRICA) 100 MG capsule       Take 1 capsule (100 mg total) by mouth 2 (two) times daily.    Take 100 mg by mouth 2 (two) times daily.    PREGABALIN (LYRICA) 50 MG CAPSULE pregabalin (LYRICA) 50 MG capsule       Take 1 capsule (50 mg total) by mouth 2 (two) times daily.    Take 1 capsule (50 mg total) by mouth 3 (three) times daily.    RIVAROXABAN (XARELTO) 20 MG TAB XARELTO 20 mg Tab       Take 1 tablet (20 mg total) by mouth once daily.     TAKE ONE TABLET BY MOUTH EVERY DAY   Discontinued Medications    ALLERGY RELIEF, LORATADINE, 10 MG TABLET    Take 10 mg by mouth once daily.    AMLODIPINE-VALSARTAN (EXFORGE) 5-320 MG PER TABLET    Take 1 tablet by mouth once daily.      JOHN Altman M.D

## 2025-05-07 ENCOUNTER — OFFICE VISIT (OUTPATIENT)
Dept: FAMILY MEDICINE | Facility: CLINIC | Age: 65
End: 2025-05-07
Payer: MEDICARE

## 2025-05-07 ENCOUNTER — TELEPHONE (OUTPATIENT)
Dept: ELECTROPHYSIOLOGY | Facility: CLINIC | Age: 65
End: 2025-05-07
Payer: MEDICARE

## 2025-05-07 VITALS
DIASTOLIC BLOOD PRESSURE: 82 MMHG | SYSTOLIC BLOOD PRESSURE: 150 MMHG | HEART RATE: 89 BPM | HEIGHT: 64 IN | BODY MASS INDEX: 41.29 KG/M2 | OXYGEN SATURATION: 96 % | WEIGHT: 241.88 LBS

## 2025-05-07 DIAGNOSIS — I10 ESSENTIAL HYPERTENSION: Primary | ICD-10-CM

## 2025-05-07 DIAGNOSIS — E11.9 TYPE 2 DIABETES MELLITUS WITHOUT COMPLICATION, WITHOUT LONG-TERM CURRENT USE OF INSULIN: ICD-10-CM

## 2025-05-07 DIAGNOSIS — I48.0 PAROXYSMAL ATRIAL FIBRILLATION: ICD-10-CM

## 2025-05-07 PROCEDURE — 3044F HG A1C LEVEL LT 7.0%: CPT | Mod: CPTII,S$GLB,,

## 2025-05-07 PROCEDURE — 3288F FALL RISK ASSESSMENT DOCD: CPT | Mod: CPTII,S$GLB,,

## 2025-05-07 PROCEDURE — 99999 PR PBB SHADOW E&M-EST. PATIENT-LVL V: CPT | Mod: PBBFAC,,,

## 2025-05-07 PROCEDURE — 3077F SYST BP >= 140 MM HG: CPT | Mod: CPTII,S$GLB,,

## 2025-05-07 PROCEDURE — 3061F NEG MICROALBUMINURIA REV: CPT | Mod: CPTII,S$GLB,,

## 2025-05-07 PROCEDURE — 99215 OFFICE O/P EST HI 40 MIN: CPT | Mod: S$GLB,,,

## 2025-05-07 PROCEDURE — 3079F DIAST BP 80-89 MM HG: CPT | Mod: CPTII,S$GLB,,

## 2025-05-07 PROCEDURE — 4010F ACE/ARB THERAPY RXD/TAKEN: CPT | Mod: CPTII,S$GLB,,

## 2025-05-07 PROCEDURE — 93005 ELECTROCARDIOGRAM TRACING: CPT | Mod: S$GLB,,,

## 2025-05-07 PROCEDURE — G2211 COMPLEX E/M VISIT ADD ON: HCPCS | Mod: S$GLB,,,

## 2025-05-07 PROCEDURE — 1159F MED LIST DOCD IN RCRD: CPT | Mod: CPTII,S$GLB,,

## 2025-05-07 PROCEDURE — 1101F PT FALLS ASSESS-DOCD LE1/YR: CPT | Mod: CPTII,S$GLB,,

## 2025-05-07 PROCEDURE — 93010 ELECTROCARDIOGRAM REPORT: CPT | Mod: S$GLB,,, | Performed by: STUDENT IN AN ORGANIZED HEALTH CARE EDUCATION/TRAINING PROGRAM

## 2025-05-07 PROCEDURE — 3008F BODY MASS INDEX DOCD: CPT | Mod: CPTII,S$GLB,,

## 2025-05-07 PROCEDURE — 1160F RVW MEDS BY RX/DR IN RCRD: CPT | Mod: CPTII,S$GLB,,

## 2025-05-07 PROCEDURE — 3066F NEPHROPATHY DOC TX: CPT | Mod: CPTII,S$GLB,,

## 2025-05-07 NOTE — PROGRESS NOTES
"Subjective     Patient ID: Yeny Hargrove is a 65 y.o. female.    Chief Complaint: Follow-up      HPI  66 y/o female with DM, HTN, HLD, Afib, JOSSIE, GERD, and Vit D def presents to clinic for follow up.     HTN: takes exforge 10/320 and metoprolol 100 mg. Blood pressure in clinic elevated 150/82. HR 89. She is having stress with family issues at home     DM: on metformin     States that she has been having sinus congestion and cough since last week. She is taking mucinex  and corociden. This is not improving         Review of Systems   Constitutional:  Negative for fatigue and fever.   Respiratory:  Positive for cough. Negative for shortness of breath and wheezing.    Cardiovascular:  Negative for chest pain and palpitations.   Neurological:  Negative for dizziness, light-headedness and headaches.          Objective     Vitals:    05/07/25 1022   BP: (!) 150/82   Pulse: 89   SpO2: 96%   Weight: 109.7 kg (241 lb 13.5 oz)   Height: 5' 4" (1.626 m)   PainSc:   3   PainLoc: Generalized              Physical Exam  Constitutional:       General: She is not in acute distress.     Appearance: She is not toxic-appearing.   Cardiovascular:      Rate and Rhythm: Rhythm irregular.   Pulmonary:      Effort: Pulmonary effort is normal. No respiratory distress.      Breath sounds: Normal breath sounds. No wheezing.   Neurological:      Mental Status: She is alert and oriented to person, place, and time.              Assessment and Plan     1. Essential hypertension    2. Type 2 diabetes mellitus without complication, without long-term current use of insulin    3. Paroxysmal atrial fibrillation  -     IN OFFICE EKG 12-LEAD (to Muse)          Increase Metoprolol to 200 mg daily  Continue amlodipine -valsartan to 10/320 daily   Continue taking Bp daily. Write down on log   Continue statin  Continue xarelto   Continue Metformin     ED precautions given    Appt with Ronit in 1 month for follow up     An exam HR sounds irregular, EKG  done " shows a fib with RVR   Spoke to both cariology and EP. Dr Vera will see her in clinic this week. Wants her to increase metoprolol to 200 mg for now.     With her in afib, cannot evaluate blood pressure. Will get back in 1 month to check.     45  minutes of total time spent on the encounter, which includes face to face time and non-face to face time preparing to see the patient (eg, review of tests), Obtaining and/or reviewing separately obtained history, Documenting clinical information in the electronic or other health record, Independently interpreting results (not separately reported) and communicating results to the patient/family/caregiver, or Care coordination (not separately reported).      Ronit Clements PA-C  Family Practice/Internal Medicine   Office Phone: [phone]

## 2025-05-07 NOTE — PATIENT INSTRUCTIONS
Increase Metoprolol to 200 mg daily  Continue amlodipine -valsartan to 10/320 daily   Continue taking Bp daily. Write down on log   Continue statin  Continue xarelto   Continue Metformin     ED precautions given    Appt with Ronit in 1 month for follow up

## 2025-05-09 ENCOUNTER — OFFICE VISIT (OUTPATIENT)
Dept: CARDIOLOGY | Facility: CLINIC | Age: 65
End: 2025-05-09
Payer: MEDICARE

## 2025-05-09 VITALS
WEIGHT: 239.19 LBS | HEIGHT: 64 IN | HEART RATE: 118 BPM | DIASTOLIC BLOOD PRESSURE: 68 MMHG | BODY MASS INDEX: 40.83 KG/M2 | SYSTOLIC BLOOD PRESSURE: 122 MMHG

## 2025-05-09 DIAGNOSIS — I15.2 HYPERTENSION ASSOCIATED WITH DIABETES: ICD-10-CM

## 2025-05-09 DIAGNOSIS — I48.0 PAROXYSMAL ATRIAL FIBRILLATION: Primary | ICD-10-CM

## 2025-05-09 DIAGNOSIS — E66.01 MORBID OBESITY: ICD-10-CM

## 2025-05-09 DIAGNOSIS — J45.40 MODERATE PERSISTENT ASTHMA, UNSPECIFIED WHETHER COMPLICATED: ICD-10-CM

## 2025-05-09 DIAGNOSIS — E11.59 HYPERTENSION ASSOCIATED WITH DIABETES: ICD-10-CM

## 2025-05-09 DIAGNOSIS — E11.9 TYPE 2 DIABETES MELLITUS WITHOUT COMPLICATION, WITHOUT LONG-TERM CURRENT USE OF INSULIN: ICD-10-CM

## 2025-05-09 LAB
OHS QRS DURATION: 90 MS
OHS QTC CALCULATION: 416 MS

## 2025-05-09 PROCEDURE — 99999 PR PBB SHADOW E&M-EST. PATIENT-LVL III: CPT | Mod: PBBFAC,,, | Performed by: INTERNAL MEDICINE

## 2025-05-09 RX ORDER — DABIGATRAN ETEXILATE 150 MG/1
150 CAPSULE ORAL 2 TIMES DAILY
Qty: 60 CAPSULE | Refills: 11 | Status: SHIPPED | OUTPATIENT
Start: 2025-05-09

## 2025-05-09 NOTE — PROGRESS NOTES
Subjective   Patient ID:  Yeny Hargrove is a 65 y.o. female who presents for evaluation of Atrial Fibrillation    Referring Physician: Royal Iniguez DO    HPI  Prior Hx:  I had the pleasure of seeing Mrs Hargrove today in our electrophysiology clinic in follow-up for her atrial fibrillation. As you are aware she is a pleasant 65 year-old woman with hypertension, type 2 DM, breast cancer, obesity and paroxysmal atrial fibrillation. First presented to the hospital with palpitations in 3/2022. Underwent DCCV with Dr. Benavidez. Was started on xarelto. Had another paroxysm in December of 2022. At some point she stopped it due to cost. Returned to the ER in February 2025 with shortness of breath. CTA PE protocol was negative. Rhythm was sinus tachycardia. Discharged from ER. 1 week later diagnosed with COVID. She was referred to discuss her AF in March of 2025. She resumed xarelto. She reported no AF symptoms since 12/2022. She was concerned with cost of xarelto. Offered to switch to pradaxa.    I reviewed available ECGs in [facility] and my personal interpretation summary is either sinus rhythm or AF with RVR.    Interim Hx:  Mrs Hargrove returns for follow-up after being observed to be in AF with RVR at a clinic visit. She was not taking her anticoagulation. Metoprolol was increased.        Review of Systems   Constitutional: Negative for fever and malaise/fatigue.   HENT:  Negative for congestion and sore throat.    Eyes:  Negative for blurred vision and visual disturbance.   Cardiovascular:  Positive for irregular heartbeat and palpitations. Negative for chest pain, dyspnea on exertion, near-syncope and syncope.   Respiratory:  Positive for cough. Negative for shortness of breath.    Hematologic/Lymphatic: Negative for bleeding problem. Does not bruise/bleed easily.   Skin: Negative.    Musculoskeletal: Negative.    Gastrointestinal:  Negative for bloating, abdominal pain, hematochezia and melena.   Neurological:  Negative for  focal weakness and weakness.   Psychiatric/Behavioral: Negative.            Objective     Physical Exam  Vitals reviewed.   Constitutional:       General: She is not in acute distress.     Appearance: She is well-developed. She is not diaphoretic.   HENT:      Head: Normocephalic and atraumatic.   Eyes:      General:         Right eye: No discharge.         Left eye: No discharge.      Conjunctiva/sclera: Conjunctivae normal.   Cardiovascular:      Rate and Rhythm: Tachycardia present. Rhythm irregularly irregular.      Heart sounds: No murmur heard.     No friction rub. No gallop.   Pulmonary:      Effort: Pulmonary effort is normal. No respiratory distress.      Breath sounds: Normal breath sounds. No wheezing or rales.   Abdominal:      General: Bowel sounds are normal. There is no distension.      Palpations: Abdomen is soft.      Tenderness: There is no abdominal tenderness.   Musculoskeletal:      Cervical back: Neck supple.   Skin:     General: Skin is warm and dry.   Neurological:      Mental Status: She is alert and oriented to person, place, and time.   Psychiatric:         Behavior: Behavior normal.         Thought Content: Thought content normal.         Judgment: Judgment normal.            Assessment and Plan     1. Paroxysmal atrial fibrillation    2. Hypertension associated with diabetes    3. Moderate persistent asthma, unspecified whether complicated    4. Type 2 diabetes mellitus without complication, without long-term current use of insulin    5. Morbid obesity        Plan:  In summary, Mrs Hargrove is a pleasant 65 year-old woman with hypertension (on meds), type 2 DM (on meds), breast cancer, obesity and paroxysmal atrial fibrillation with RVR. She is now in persistent AF with RVR. Metoprolol recently increased. Will try dabigatran as xarelto is unaffordable. She declines warfarin. She is interested in ablation. Discussed risks/benefits. She elects to proceed. Will also cardiovert soon as I do not  want to leave her in this until the ablation.    Plan  TAYLA/DCCV    PVI with PFA  [facility] for mapping  Anesthesia  TAYLA day of, cancel if in sinus  Hold dabigatran AM of  Hold exforge AM of    RTC in 6 months.    Thank you for allowing me to participate in the care of this patient. Please do not hesitate to call me with any questions or concerns.    Jermaine Vera MD, PhD  Cardiac Electrophysiology

## 2025-05-12 ENCOUNTER — PATIENT MESSAGE (OUTPATIENT)
Dept: ELECTROPHYSIOLOGY | Facility: CLINIC | Age: 65
End: 2025-05-12
Payer: MEDICARE

## 2025-05-12 ENCOUNTER — TELEPHONE (OUTPATIENT)
Dept: ELECTROPHYSIOLOGY | Facility: CLINIC | Age: 65
End: 2025-05-12
Payer: MEDICARE

## 2025-05-12 DIAGNOSIS — I48.0 PAROXYSMAL ATRIAL FIBRILLATION: Primary | ICD-10-CM

## 2025-05-12 NOTE — TELEPHONE ENCOUNTER
Spoke with patient and scheduled procedure: TAYLA/DCCV on 5/21/25 with an arrival time of 8AM at [facility].   Reviewed procedure with patient. Informed pt that she will not be able to drive herself home post procedure due to receiving anesthesia during the procedure. Pt confirmed that she will have a caregiver available to bring her home post procedure.   Labs to be done at: [facility] on 5/16/25  Confirmed that patient is not on GLP-1 agonist  Advised to hold the following meds:Metformin day of procedure, last dose should be taken on 5/20/25.   Confirmed that patient does not have any implanted device that has remote or monitor that could cause electrical interference  Instructions will be sent via portal/email/mail, as requested  Patient verbalized understanding.

## 2025-05-15 ENCOUNTER — TELEPHONE (OUTPATIENT)
Dept: ELECTROPHYSIOLOGY | Facility: CLINIC | Age: 65
End: 2025-05-15
Payer: MEDICARE

## 2025-05-15 NOTE — TELEPHONE ENCOUNTER
Spoke with Patient . Scheduled for PVI ablation procedure on 7/31/2025 with Dr Vera. Procedure details reviewed and advised that pre procedure patient instructions will be sent via mail as requested. Advised to call the office for any questions or concerns prior to scheduled procedure. Understanding verbalized.

## 2025-05-16 ENCOUNTER — LAB VISIT (OUTPATIENT)
Dept: LAB | Facility: HOSPITAL | Age: 65
End: 2025-05-16
Attending: FAMILY MEDICINE
Payer: MEDICARE

## 2025-05-16 DIAGNOSIS — I48.0 PAROXYSMAL ATRIAL FIBRILLATION: ICD-10-CM

## 2025-05-16 DIAGNOSIS — E61.1 IRON DEFICIENCY: ICD-10-CM

## 2025-05-16 DIAGNOSIS — E78.49 OTHER HYPERLIPIDEMIA: ICD-10-CM

## 2025-05-16 DIAGNOSIS — E11.9 TYPE 2 DIABETES MELLITUS WITHOUT COMPLICATION, WITHOUT LONG-TERM CURRENT USE OF INSULIN: ICD-10-CM

## 2025-05-16 LAB
ABSOLUTE EOSINOPHIL (OHS): 0.05 K/UL
ABSOLUTE MONOCYTE (OHS): 0.61 K/UL (ref 0.3–1)
ABSOLUTE NEUTROPHIL COUNT (OHS): 2.77 K/UL (ref 1.8–7.7)
ALBUMIN SERPL BCP-MCNC: 4 G/DL (ref 3.5–5.2)
ALP SERPL-CCNC: 90 UNIT/L (ref 38–126)
ALT SERPL W/O P-5'-P-CCNC: 13 UNIT/L (ref 10–44)
ANION GAP (OHS): 9 MMOL/L (ref 8–16)
APTT PPP: 33.2 SECONDS (ref 21–32)
AST SERPL-CCNC: 17 UNIT/L (ref 15–46)
BASOPHILS # BLD AUTO: 0.05 K/UL
BASOPHILS NFR BLD AUTO: 0.8 %
BILIRUB SERPL-MCNC: 0.5 MG/DL (ref 0.1–1)
BUN SERPL-MCNC: 10 MG/DL (ref 7–17)
CALCIUM SERPL-MCNC: 9.5 MG/DL (ref 8.7–10.5)
CHLORIDE SERPL-SCNC: 104 MMOL/L (ref 95–110)
CHOLEST SERPL-MCNC: 181 MG/DL (ref 120–199)
CHOLEST/HDLC SERPL: 3.4 {RATIO} (ref 2–5)
CO2 SERPL-SCNC: 30 MMOL/L (ref 23–29)
CREAT SERPL-MCNC: 1 MG/DL (ref 0.5–1.4)
EAG (OHS): 157 MG/DL (ref 68–131)
ERYTHROCYTE [DISTWIDTH] IN BLOOD BY AUTOMATED COUNT: 15.6 % (ref 11.5–14.5)
FERRITIN SERPL-MCNC: 44.9 NG/ML (ref 20–300)
GFR SERPLBLD CREATININE-BSD FMLA CKD-EPI: >60 ML/MIN/1.73/M2
GLUCOSE SERPL-MCNC: 128 MG/DL (ref 70–110)
HBA1C MFR BLD: 7.1 % (ref 4–5.6)
HCT VFR BLD AUTO: 41.9 % (ref 37–48.5)
HDLC SERPL-MCNC: 54 MG/DL (ref 40–75)
HDLC SERPL: 29.8 % (ref 20–50)
HGB BLD-MCNC: 13.2 GM/DL (ref 12–16)
IMM GRANULOCYTES # BLD AUTO: 0.01 K/UL (ref 0–0.04)
IMM GRANULOCYTES NFR BLD AUTO: 0.2 % (ref 0–0.5)
INR PPP: 1.1 (ref 0.8–1.2)
IRON SATN MFR SERPL: 12 % (ref 20–50)
IRON SERPL-MCNC: 45 UG/DL (ref 30–160)
LDLC SERPL CALC-MCNC: 111 MG/DL (ref 63–159)
LYMPHOCYTES # BLD AUTO: 2.55 K/UL (ref 1–4.8)
MCH RBC QN AUTO: 27.6 PG (ref 27–31)
MCHC RBC AUTO-ENTMCNC: 31.5 G/DL (ref 32–36)
MCV RBC AUTO: 88 FL (ref 82–98)
NONHDLC SERPL-MCNC: 127 MG/DL
NUCLEATED RBC (/100WBC) (OHS): 0 /100 WBC
PLATELET # BLD AUTO: 280 K/UL (ref 150–450)
PMV BLD AUTO: 9.9 FL (ref 9.2–12.9)
POTASSIUM SERPL-SCNC: 4.5 MMOL/L (ref 3.5–5.1)
PROT SERPL-MCNC: 7.2 GM/DL (ref 6–8.4)
PROTHROMBIN TIME: 12.2 SECONDS (ref 9–12.5)
RBC # BLD AUTO: 4.79 M/UL (ref 4–5.4)
RELATIVE EOSINOPHIL (OHS): 0.8 %
RELATIVE LYMPHOCYTE (OHS): 42.2 % (ref 18–48)
RELATIVE MONOCYTE (OHS): 10.1 % (ref 4–15)
RELATIVE NEUTROPHIL (OHS): 45.9 % (ref 38–73)
SODIUM SERPL-SCNC: 143 MMOL/L (ref 136–145)
TIBC SERPL-MCNC: 373 UG/DL (ref 250–450)
TRANSFERRIN SERPL-MCNC: 252 MG/DL (ref 200–375)
TRIGL SERPL-MCNC: 80 MG/DL (ref 30–150)
WBC # BLD AUTO: 6.04 K/UL (ref 3.9–12.7)

## 2025-05-16 PROCEDURE — 85730 THROMBOPLASTIN TIME PARTIAL: CPT | Mod: PN

## 2025-05-16 PROCEDURE — 80053 COMPREHEN METABOLIC PANEL: CPT | Mod: PN

## 2025-05-16 PROCEDURE — 83036 HEMOGLOBIN GLYCOSYLATED A1C: CPT | Mod: PN

## 2025-05-16 PROCEDURE — 85610 PROTHROMBIN TIME: CPT | Mod: PN

## 2025-05-16 PROCEDURE — 36415 COLL VENOUS BLD VENIPUNCTURE: CPT | Mod: PN

## 2025-05-16 PROCEDURE — 85025 COMPLETE CBC W/AUTO DIFF WBC: CPT | Mod: PN

## 2025-05-16 PROCEDURE — 83540 ASSAY OF IRON: CPT | Mod: PN

## 2025-05-16 PROCEDURE — 82728 ASSAY OF FERRITIN: CPT | Mod: PN

## 2025-05-16 PROCEDURE — 82465 ASSAY BLD/SERUM CHOLESTEROL: CPT | Mod: PN

## 2025-05-20 ENCOUNTER — ANESTHESIA EVENT (OUTPATIENT)
Dept: MEDSURG UNIT | Facility: HOSPITAL | Age: 65
End: 2025-05-20
Payer: MEDICARE

## 2025-05-20 ENCOUNTER — TELEPHONE (OUTPATIENT)
Dept: ELECTROPHYSIOLOGY | Facility: CLINIC | Age: 65
End: 2025-05-20
Payer: MEDICARE

## 2025-05-20 ENCOUNTER — RESULTS FOLLOW-UP (OUTPATIENT)
Dept: FAMILY MEDICINE | Facility: CLINIC | Age: 65
End: 2025-05-20

## 2025-05-20 DIAGNOSIS — I48.0 PAROXYSMAL ATRIAL FIBRILLATION: Primary | ICD-10-CM

## 2025-05-20 NOTE — TELEPHONE ENCOUNTER
Called daughter to review pre-op instructions for mother's procedure.   No answer, left voicemail requesting a call back at [phone].

## 2025-05-20 NOTE — TELEPHONE ENCOUNTER
Called patient to review pre-op instructions. No answer, voicemail not set up, unable to leave a message.     2nd call to patient to review pre-op instructions. No answer, left voicemail requesting a call back at [phone].    3rd call to patient to review pre-op instructions. No answer, left voicemail with the following instructions:   Please report to the 3rd floor cardiac short stay check in desk for 8AM on 5/21/25.  -NPO after midnight night prior to procedure; you may drink water until 6AM on the morning on your procedure.   -High importance of HOLDING Metformin on day of procedure, last dose should be taken today, 5/20/25.  Continue taking Pradaxa twice daily included tomorrow morning. Take Pradaxa with water prior to 6AM on 5/21/25.  -Pre-procedure LABS reviewed. No significant alerts noted.   -If you have experienced any fever, cough, or shortness of breath in the past 30 days please call [phone] to provide details.  -Do not wear mascara day of procedure  -Please ensure you have a caregiver available to bring you home post procedure. You will not be permitted to drive yourself due to receiving anesthesia.   Please call [phone] with any questions.

## 2025-05-21 ENCOUNTER — HOSPITAL ENCOUNTER (OUTPATIENT)
Facility: HOSPITAL | Age: 65
Discharge: HOME | End: 2025-05-21
Attending: INTERNAL MEDICINE | Admitting: INTERNAL MEDICINE
Payer: MEDICARE

## 2025-05-21 ENCOUNTER — HOSPITAL ENCOUNTER (OUTPATIENT)
Dept: CARDIOLOGY | Facility: HOSPITAL | Age: 65
Discharge: HOME | End: 2025-05-21
Attending: INTERNAL MEDICINE | Admitting: INTERNAL MEDICINE
Payer: MEDICARE

## 2025-05-21 ENCOUNTER — TELEPHONE (OUTPATIENT)
Dept: ELECTROPHYSIOLOGY | Facility: CLINIC | Age: 65
End: 2025-05-21
Payer: MEDICARE

## 2025-05-21 ENCOUNTER — ANESTHESIA (OUTPATIENT)
Dept: MEDSURG UNIT | Facility: HOSPITAL | Age: 65
End: 2025-05-21
Payer: MEDICARE

## 2025-05-21 VITALS
HEIGHT: 64 IN | TEMPERATURE: 99 F | WEIGHT: 239 LBS | DIASTOLIC BLOOD PRESSURE: 68 MMHG | SYSTOLIC BLOOD PRESSURE: 139 MMHG | HEART RATE: 74 BPM | BODY MASS INDEX: 40.8 KG/M2 | RESPIRATION RATE: 18 BRPM | OXYGEN SATURATION: 95 %

## 2025-05-21 VITALS
DIASTOLIC BLOOD PRESSURE: 89 MMHG | HEIGHT: 64 IN | SYSTOLIC BLOOD PRESSURE: 137 MMHG | WEIGHT: 239 LBS | BODY MASS INDEX: 40.8 KG/M2

## 2025-05-21 DIAGNOSIS — I48.0 PAROXYSMAL ATRIAL FIBRILLATION: ICD-10-CM

## 2025-05-21 DIAGNOSIS — E11.9 TYPE 2 DIABETES MELLITUS WITHOUT COMPLICATION, WITHOUT LONG-TERM CURRENT USE OF INSULIN: Primary | ICD-10-CM

## 2025-05-21 DIAGNOSIS — I48.91 ATRIAL FIBRILLATION: ICD-10-CM

## 2025-05-21 DIAGNOSIS — I10 ESSENTIAL HYPERTENSION: ICD-10-CM

## 2025-05-21 LAB
AORTIC SIZE INDEX (SOV): 1.5 CM/M2
AORTIC SIZE INDEX: 2.1 CM/M2
ASCENDING AORTA: 4.4 CM
BSA FOR ECHO PROCEDURE: 2.21 M2
OHS QRS DURATION: 86 MS
OHS QRS DURATION: 86 MS
OHS QTC CALCULATION: 447 MS
OHS QTC CALCULATION: 455 MS
POCT GLUCOSE: 130 MG/DL (ref 70–110)
POCT GLUCOSE: 141 MG/DL (ref 70–110)
SINUS: 3.2 CM
STJ: 2.9 CM

## 2025-05-21 PROCEDURE — 63600175 PHARM REV CODE 636 W HCPCS

## 2025-05-21 PROCEDURE — 25000242 PHARM REV CODE 250 ALT 637 W/ HCPCS

## 2025-05-21 PROCEDURE — 37000009 HC ANESTHESIA EA ADD 15 MINS: Performed by: INTERNAL MEDICINE

## 2025-05-21 PROCEDURE — 93312 ECHO TRANSESOPHAGEAL: CPT

## 2025-05-21 PROCEDURE — 93010 ELECTROCARDIOGRAM REPORT: CPT | Mod: ,,, | Performed by: INTERNAL MEDICINE

## 2025-05-21 PROCEDURE — 92960 CARDIOVERSION ELECTRIC EXT: CPT | Mod: ,,, | Performed by: INTERNAL MEDICINE

## 2025-05-21 PROCEDURE — 37000008 HC ANESTHESIA 1ST 15 MINUTES: Performed by: INTERNAL MEDICINE

## 2025-05-21 PROCEDURE — 93325 DOPPLER ECHO COLOR FLOW MAPG: CPT | Mod: 26,,, | Performed by: INTERNAL MEDICINE

## 2025-05-21 PROCEDURE — 93320 DOPPLER ECHO COMPLETE: CPT | Mod: 26,,, | Performed by: INTERNAL MEDICINE

## 2025-05-21 PROCEDURE — 93312 ECHO TRANSESOPHAGEAL: CPT | Mod: 26,,, | Performed by: INTERNAL MEDICINE

## 2025-05-21 PROCEDURE — 92960 CARDIOVERSION ELECTRIC EXT: CPT | Performed by: INTERNAL MEDICINE

## 2025-05-21 PROCEDURE — 93005 ELECTROCARDIOGRAM TRACING: CPT

## 2025-05-21 PROCEDURE — 25000003 PHARM REV CODE 250

## 2025-05-21 RX ORDER — DEXMEDETOMIDINE HYDROCHLORIDE 100 UG/ML
INJECTION, SOLUTION INTRAVENOUS
Status: DISCONTINUED | OUTPATIENT
Start: 2025-05-21 | End: 2025-05-21

## 2025-05-21 RX ORDER — PROPOFOL 10 MG/ML
VIAL (ML) INTRAVENOUS
Status: DISCONTINUED | OUTPATIENT
Start: 2025-05-21 | End: 2025-05-21

## 2025-05-21 RX ORDER — KETAMINE HCL IN 0.9 % NACL 50 MG/5 ML
SYRINGE (ML) INTRAVENOUS
Status: DISCONTINUED | OUTPATIENT
Start: 2025-05-21 | End: 2025-05-21

## 2025-05-21 RX ORDER — ALBUTEROL SULFATE 90 UG/1
INHALANT RESPIRATORY (INHALATION)
Status: DISCONTINUED | OUTPATIENT
Start: 2025-05-21 | End: 2025-05-21

## 2025-05-21 RX ORDER — LIDOCAINE HYDROCHLORIDE 20 MG/ML
INJECTION, SOLUTION EPIDURAL; INFILTRATION; INTRACAUDAL; PERINEURAL
Status: DISCONTINUED | OUTPATIENT
Start: 2025-05-21 | End: 2025-05-21

## 2025-05-21 RX ORDER — METOPROLOL SUCCINATE 50 MG/1
50 TABLET, EXTENDED RELEASE ORAL DAILY
Qty: 90 TABLET | Refills: 3 | Status: SHIPPED | OUTPATIENT
Start: 2025-05-21 | End: 2026-05-21

## 2025-05-21 RX ORDER — FLECAINIDE ACETATE 100 MG/1
100 TABLET ORAL EVERY 12 HOURS
Qty: 60 TABLET | Refills: 11 | Status: SHIPPED | OUTPATIENT
Start: 2025-05-21 | End: 2026-05-21

## 2025-05-21 RX ADMIN — ALBUTEROL SULFATE 2 PUFF: 108 AEROSOL, METERED RESPIRATORY (INHALATION) at 10:05

## 2025-05-21 RX ADMIN — DEXMEDETOMIDINE 4 MCG: 200 INJECTION, SOLUTION INTRAVENOUS at 10:05

## 2025-05-21 RX ADMIN — SODIUM CHLORIDE: 9 INJECTION, SOLUTION INTRAVENOUS at 10:05

## 2025-05-21 RX ADMIN — PROPOFOL 40 MG: 10 INJECTION, EMULSION INTRAVENOUS at 10:05

## 2025-05-21 RX ADMIN — DEXMEDETOMIDINE 6 MCG: 200 INJECTION, SOLUTION INTRAVENOUS at 10:05

## 2025-05-21 RX ADMIN — LIDOCAINE HYDROCHLORIDE 100 MG: 20 INJECTION, SOLUTION EPIDURAL; INFILTRATION; INTRACAUDAL; PERINEURAL at 10:05

## 2025-05-21 RX ADMIN — PROPOFOL 125 MCG/KG/MIN: 10 INJECTION, EMULSION INTRAVENOUS at 10:05

## 2025-05-21 RX ADMIN — Medication 10 MG: at 10:05

## 2025-05-21 NOTE — NURSING
Pt ambulated around unit and bathroom. Pt voided. Tolerated walk well. Vital signs remain stable. No c/o pain or discomfort at this time, resp even and unlabored.

## 2025-05-21 NOTE — TRANSFER OF CARE
"Anesthesia Transfer of Care Note    Patient: Yeny Hargrove    Procedure(s) Performed: Procedure(s) (LRB):  Cardioversion or Defibrillation (N/A)  Transesophageal echo (TAYLA) intra-procedure log documentation (N/A)    Patient location: PACU    Anesthesia Type: general    Transport from OR: Transported from OR on 6-10 L/min O2 by face mask with adequate spontaneous ventilation    Post pain: adequate analgesia    Post assessment: no apparent anesthetic complications and tolerated procedure well    Post vital signs: stable    Level of consciousness: awake, alert and oriented    Nausea/Vomiting: no nausea/vomiting    Complications: none    Transfer of care protocol was followed      Last vitals: Visit Vitals  /89 (BP Location: Right arm, Patient Position: Lying)   Pulse (!) 123   Temp 37 °C (98.6 °F) (Temporal)   Resp 17   Ht 5' 4" (1.626 m)   Wt 108.4 kg (239 lb)   LMP  (LMP Unknown)   SpO2 97%   BMI 41.02 kg/m²     "

## 2025-05-21 NOTE — PLAN OF CARE
Patient admitted to SSCU room 9 accompanied by  sister  Patient lindy. Vss. No complaints. Pre procedure admission completed. Orders on chart carried out. PIV started. Plan of care reviewed with patient and family. All questions answered. Call light placed within reach.

## 2025-05-21 NOTE — HOSPITAL COURSE
Patient underwent TAYLA without evidence of LEO thrombus. Proceeded with DCCV, converting from AF to sinus rhythm. Patient tolerated the procedure without any acute complications. Post-DCCV ECG revealed NSR at 80 bpm. Plan to start flecainide 100mg BID, decrease metoprolol to 50mg qd. Instructed to return in 1 week for follow up ECG. Ablation scheduled for 7/31/25.  Patient was assessed at bedside prior to discharge, they reported feeling well and denied chest discomfort, shortness of breath, palpitations, lightheadedness, or any other acute symptoms. Discharge instructions were discussed with patient and all questions were answered. Patient was discharged home in stable condition.

## 2025-05-21 NOTE — ANESTHESIA PREPROCEDURE EVALUATION
[facility]  Anesthesia Pre-Operative Evaluation         Patient Name/: Yeny Hargrove, 1960  MRN: [id number]    SUBJECTIVE:     Pre-operative evaluation for Procedure(s) (LRB):  Cardioversion or Defibrillation (N/A)  Transesophageal echo (TAYLA) intra-procedure log documentation (N/A)     2025    Yeny Hargrove is a 65 y.o. female     Patient now presents for the above procedure(s).    ________________________________________  Results for orders placed during the hospital encounter of 25    Echo    Interpretation Summary    Aorta: Aortic root is normal in size measuring 2.86 cm. Ascending aorta is moderately dilated measuring 4.8 cm.    Aortic Valve: There is moderate aortic regurgitation.    Left Ventricle: The left ventricle is normal in size. Mildly increased wall thickness. Normal wall motion. There is normal systolic function. Quantitated ejection fraction is 55%. Global longitudinal strain is -18.0%. Global longitudinal strain is normal. Grade I diastolic dysfunction.    Right Ventricle: Normal right ventricular cavity size. Systolic function is normal.    ________________________________________    LDA:        Peripheral IV - Single Lumen 25 0900 20 G 1 in Anterior;Proximal;Right Forearm (Active)   Number of days: 0       Drips:       Problem List[1]    Review of patient's allergies indicates:   Allergen Reactions    Iodine and iodide containing products Swelling    Shellfish containing products Swelling    Shellfish derived Swelling    Iodinated contrast media Swelling    Pcn [penicillins] Swelling       Current Inpatient Medications:       Medications Ordered Prior to Encounter[2]    Past Surgical History:   Procedure Laterality Date    BREAST LUMPECTOMY      HYSTERECTOMY      MASTECTOMY      TREATMENT OF CARDIAC ARRHYTHMIA N/A 3/16/2022    Procedure: Cardioversion or Defibrillation;  Surgeon: Shanika Benavidez MD;  Location: [facility] CATH LAB/EP;  Service: Cardiology;   Laterality: N/A;       Social History:  Tobacco Use: Low Risk  (5/21/2025)    Patient History     Smoking Tobacco Use: Never     Smokeless Tobacco Use: Never     Passive Exposure: Past       Alcohol Use: Not At Risk (8/14/2018)    AUDIT-C     Frequency of Alcohol Consumption: Never     Average Number of Drinks: Not on file     Frequency of Binge Drinking: Not on file       OBJECTIVE:     Vital Signs Range:  BMI Readings from Last 1 Encounters:   05/21/25 41.02 kg/m²       Temp:  [37 °C (98.6 °F)]   Pulse:  [123]   Resp:  [17]   BP: (137)/(89)   SpO2:  [97 %]        Significant Labs:        Component Value Date/Time    WBC 6.04 05/16/2025 0821    HGB 13.2 05/16/2025 0821    HGB 13.6 02/03/2025 1448    HCT 41.9 05/16/2025 0821    HCT 42.7 02/03/2025 1448     05/16/2025 0821     02/03/2025 1448     05/16/2025 0821     02/03/2025 1448    K 4.5 05/16/2025 0821    K 4.3 02/03/2025 1448     05/16/2025 0821     02/03/2025 1448    CO2 30 (H) 05/16/2025 0821    CO2 23 02/03/2025 1448     (H) 05/16/2025 0821     (H) 02/03/2025 1448    BUN 10 05/16/2025 0821    CREATININE 1.0 05/16/2025 0821    MG 2.0 12/06/2022 1952    CALCIUM 9.5 05/16/2025 0821    CALCIUM 9.3 02/03/2025 1448    ALBUMIN 4.0 05/16/2025 0821    ALBUMIN 3.5 02/03/2025 1448    PROT 7.2 05/16/2025 0821    PROT 7.6 02/03/2025 1448    ALKPHOS 90 05/16/2025 0821    ALKPHOS 104 02/03/2025 1448    BILITOT 0.5 05/16/2025 0821    BILITOT 0.3 02/03/2025 1448    AST 17 05/16/2025 0821    AST 9 (L) 02/03/2025 1448    ALT 13 05/16/2025 0821    ALT 7 (L) 02/03/2025 1448    INR 1.1 05/16/2025 0821    HGBA1C 7.1 (H) 05/16/2025 0821    HGBA1C 6.7 (H) 01/16/2025 1210        Please see Results Review for additional labs.     Diagnostic Studies: No relevant studies.    EKG:   Results for orders placed or performed in visit on 05/07/25   IN OFFICE EKG 12-LEAD (to [place])    Collection Time: 05/07/25  9:41 AM   Result Value Ref Range     QRS Duration 90 ms    [facility] QTC Calculation 416 ms    Narrative    Test Reason : I48.0,    Vent. Rate : 119 BPM     Atrial Rate : 125 BPM     P-R Int :    ms          QRS Dur :  90 ms      QT Int : 296 ms       P-R-T Axes :    -16  83 degrees    QTcB Int : 416 ms    Atrial fibrillation with rapid ventricular response  Abnormal ECG  When compared with ECG of 03-Feb-2025 12:09,  Atrial fibrillation has replaced Sinus rhythm  Criteria for Septal infarct are no longer Present  Confirmed by Shlomo Gaming ([id number]) on 5/9/2025 12:23:27 AM    Referred By: YENY HARGROVE           Confirmed By: Shlomo Malagon       ECHO:  See subjective, if available.      ASSESSMENT/PLAN:                                                                                                               05/21/2025  Yeny Hargrove is a 65 y.o., female.      Pre-op Assessment          Review of Systems  Pulmonary:    Asthma                    Hepatic/GI:     GERD                Endocrine:  Diabetes         Morbid Obesity / BMI > 40      Physical Exam  General: Cooperative, Alert and Oriented        Anesthesia Plan  Type of Anesthesia, risks & benefits discussed:    Anesthesia Type: Gen Natural Airway  Intra-op Monitoring Plan: Standard ASA Monitors  Informed Consent: Informed consent signed with the Patient and all parties understand the risks and agree with anesthesia plan.  All questions answered.   ASA Score: 3  Day of Surgery Review of History & Physical: H&P Update referred to the surgeon/provider.    Ready For Surgery From Anesthesia Perspective.     .           [1]   Patient Active Problem List  Diagnosis    Malignant neoplasm of upper-inner quadrant of left breast in female, estrogen receptor positive    Estrogen receptor positive    Type 2 diabetes mellitus without complication, without long-term current use of insulin    Other hyperlipidemia    Essential hypertension    Hypertension associated with diabetes    Paroxysmal atrial  "fibrillation    Moderate persistent asthma    Iron deficiency    Recurrent cancer of left breast    Gastroesophageal reflux disease without esophagitis    Ductal carcinoma in situ (DCIS) of left breast    Vitamin D deficiency    Low serum vitamin B12    Morbid obesity   [2]   No current facility-administered medications on file prior to encounter.     Current Outpatient Medications on File Prior to Encounter   Medication Sig Dispense Refill    albuterol (PROVENTIL/VENTOLIN HFA) 90 mcg/actuation inhaler Inhale 2 puffs into the lungs every 6 (six) hours as needed for Wheezing or Shortness of Breath. Rescue 6.7 g 3    amitriptyline (ELAVIL) 150 MG Tab Take 150 mg by mouth every evening.      amlodipine-valsartan (EXFORGE)  mg per tablet Take 1 tablet by mouth once daily. 90 tablet 3    atorvastatin (LIPITOR) 80 MG tablet Take 1 tablet (80 mg total) by mouth once daily. 90 tablet 1    azelastine (ASTELIN) 137 mcg (0.1 %) nasal spray 1 spray (137 mcg total) by Nasal route 2 (two) times daily. 30 mL 0    cetirizine (ZYRTEC) 10 MG tablet Take 1 tablet (10 mg total) by mouth once daily. 30 tablet 0    dabigatran etexilate (PRADAXA) 150 mg Cap Take 1 capsule (150 mg total) by mouth 2 (two) times a day. "Do NOT break, chew, or open capsules." 60 capsule 11    diclofenac sodium (VOLTAREN ARTHRITIS PAIN) 1 % Gel Apply 2 g topically 2 (two) times daily. 150 g 1    fluticasone furoate (ARNUITY ELLIPTA) 100 mcg/actuation inhaler Inhale 1 puff into the lungs once daily. Controller 30 each 11    fluticasone propionate (FLONASE) 50 mcg/actuation nasal spray fluticasone propionate 50 mcg/actuation nasal spray,suspension      ipratropium (ATROVENT) 21 mcg (0.03 %) nasal spray 2 sprays by Each Nostril route 2 (two) times daily. 30 mL 0    metFORMIN (GLUCOPHAGE-XR) 500 MG ER 24hr tablet Take 1 tablet (500 mg total) by mouth daily with breakfast. 90 tablet 3    metoprolol succinate (TOPROL-XL) 100 MG 24 hr tablet Take 1 tablet (100 " mg total) by mouth once daily. (Patient taking differently: Take 100 mg by mouth once daily. Took 200 mg this morning 5/21/25) 90 tablet 3    pregabalin (LYRICA) 100 MG capsule Take 1 capsule (100 mg total) by mouth 2 (two) times daily. 60 capsule 1    albuterol-ipratropium (DUO-NEB) 2.5 mg-0.5 mg/3 mL nebulizer solution Take 3 mLs by nebulization every 6 (six) hours as needed for Wheezing. Rescue 75 mL 2    anastrozole (ARIMIDEX) 1 mg Tab Take 1 tablet (1 mg total) by mouth once daily. 90 tablet 1

## 2025-05-21 NOTE — DISCHARGE SUMMARY
[facility]  Cardiology  Discharge Summary      Patient Name: Yeny Hargrove  MRN: [id number]  Admission Date: 5/21/2025  Hospital Length of Stay: 0 days  Discharge Date and Time: 05/21/2025 11:25 AM  Attending Physician: Jermaine Vera MD    Discharging Provider: Ronit Phoenix PA-C  Primary Care Physician: Royal Iniguez DO    HPI:   Last OV with Dr. Vera on 5/9/25.   65 year-old woman with hypertension, type 2 DM, breast cancer, obesity and paroxysmal atrial fibrillation. First presented to the hospital with palpitations in 3/2022. Underwent DCCV with Dr. Benavidez. Was started on xarelto. Had another paroxysm in December of 2022. At some point she stopped it due to cost. Returned to the ER in February 2025 with shortness of breath. CTA PE protocol was negative. Rhythm was sinus tachycardia. Discharged from ER. 1 week later diagnosed with COVID. She was referred to discuss her AF in March of 2025. She resumed xarelto. She reported no AF symptoms since 12/2022. She was concerned with cost of xarelto. Offered to switch to pradaxa.     Interim Hx:  Mrs Hargrove returned for follow-up after being observed to be in AF with RVR at a clinic visit. She was not taking her anticoagulation. Metoprolol was increased.     Today, here for TAYLA/DCCV. No acute complaints.      Procedure(s) (LRB):  Cardioversion or Defibrillation (N/A)  Transesophageal echo (TAYLA) intra-procedure log documentation (N/A)       Indwelling Lines/Drains at time of discharge:  None       Hospital Course:  Patient underwent TAYLA without evidence of LEO thrombus. Proceeded with DCCV, converting from AF to sinus rhythm. Patient tolerated the procedure without any acute complications. Post-DCCV ECG revealed NSR at 80 bpm. Plan to start flecainide 100mg BID, decrease metoprolol to 50mg qd. Instructed to return in 1 week for follow up ECG. Ablation scheduled for 7/31/25.  Patient was assessed at bedside prior to discharge, they reported feeling well  and denied chest discomfort, shortness of breath, palpitations, lightheadedness, or any other acute symptoms. Discharge instructions were discussed with patient and all questions were answered. Patient was discharged home in stable condition.        Goals of Care Treatment Preferences:  Code Status: Full Code      Significant Diagnostic Studies: TAYLA - final report pending - prelim no LEO thrombus, proceeded with DCCV      Pending Diagnostic Studies:       None            There are no hospital problems to display for this patient.    No new Assessment & Plan notes have been filed under this hospital service since the last note was generated.  Service: Cardiology      Discharged Condition: stable    Disposition: Home or Self Care    Follow Up: Ablation scheduled for 7/31/25.    Patient Instructions:   No discharge procedures on file.  Medications:  Reconciled Home Medications:      Medication List        START taking these medications      flecainide 100 MG Tab  Commonly known as: TAMBOCOR  Take 1 tablet (100 mg total) by mouth every 12 (twelve) hours.            CHANGE how you take these medications      metoprolol succinate 50 MG 24 hr tablet  Commonly known as: TOPROL-XL  Take 1 tablet (50 mg total) by mouth once daily.  What changed:   medication strength  how much to take            CONTINUE taking these medications      albuterol 90 mcg/actuation inhaler  Commonly known as: PROVENTIL/VENTOLIN HFA  Inhale 2 puffs into the lungs every 6 (six) hours as needed for Wheezing or Shortness of Breath. Rescue     albuterol-ipratropium 2.5 mg-0.5 mg/3 mL nebulizer solution  Commonly known as: DUO-NEB  Take 3 mLs by nebulization every 6 (six) hours as needed for Wheezing. Rescue     amitriptyline 150 MG Tab  Commonly known as: ELAVIL  Take 150 mg by mouth every evening.     amlodipine-valsartan  mg per tablet  Commonly known as: EXFORGE  Take 1 tablet by mouth once daily.     anastrozole 1 mg Tab  Commonly known as:  "ARIMIDEX  Take 1 tablet (1 mg total) by mouth once daily.     ARNUITY ELLIPTA 100 mcg/actuation inhaler  Generic drug: fluticasone furoate  Inhale 1 puff into the lungs once daily. Controller     atorvastatin 80 MG tablet  Commonly known as: LIPITOR  Take 1 tablet (80 mg total) by mouth once daily.     azelastine 137 mcg (0.1 %) nasal spray  Commonly known as: ASTELIN  1 spray (137 mcg total) by Nasal route 2 (two) times daily.     cetirizine 10 MG tablet  Commonly known as: ZYRTEC  Take 1 tablet (10 mg total) by mouth once daily.     dabigatran etexilate 150 mg Cap  Commonly known as: PRADAXA  Take 1 capsule (150 mg total) by mouth 2 (two) times a day. "Do NOT break, chew, or open capsules."     diclofenac sodium 1 % Gel  Commonly known as: VOLTAREN ARTHRITIS PAIN  Apply 2 g topically 2 (two) times daily.     fluticasone propionate 50 mcg/actuation nasal spray  Commonly known as: FLONASE  fluticasone propionate 50 mcg/actuation nasal spray,suspension     ipratropium 21 mcg (0.03 %) nasal spray  Commonly known as: ATROVENT  2 sprays by Each Nostril route 2 (two) times daily.     metFORMIN 500 MG ER 24hr tablet  Commonly known as: GLUCOPHAGE-XR  Take 1 tablet (500 mg total) by mouth daily with breakfast.     pregabalin 100 MG capsule  Commonly known as: LYRICA  Take 1 capsule (100 mg total) by mouth 2 (two) times daily.              Time spent on the discharge of patient: 35 minutes    Ronit Phoenix PA-C  Cardiology  [facility]  "

## 2025-05-21 NOTE — TELEPHONE ENCOUNTER
Spoke to Catalina (Pharmacy). Informed Catalina (Pharmacy. The Multaq was discontinued by Ronit Phoenix PA-C on 5/21/25. Patient's pharmacy verbalized understanding and appreciated the call.     ----- Message from SHADY Lee sent at 5/21/2025 11:15 AM CDT -----  Regarding: FW: Drug Interaction    ----- Message -----  From: Blanca Aranda  Sent: 5/21/2025  11:03 AM CDT  To: Rosa Bridges RN  Subject: Drug Interaction                                 Catalina with [facility] pharmacy calling in reference to a drug interaction with the pt Flecainide 100 mg and Multaq 400 mg please call her at the number below. Pt is having a procedure today.[facility] [address] [place] [address] [address] [facility] Phone: [phone]: [phone]

## 2025-05-21 NOTE — ANESTHESIA POSTPROCEDURE EVALUATION
Anesthesia Post Evaluation    Patient: Yeny Hargrove    Procedure(s) Performed: Procedure(s) (LRB):  Cardioversion or Defibrillation (N/A)  Transesophageal echo (TAYLA) intra-procedure log documentation (N/A)    Final Anesthesia Type: general      Patient location during evaluation: PACU  Patient participation: Yes- Able to Participate  Level of consciousness: awake and alert  Post-procedure vital signs: reviewed and stable  Pain management: adequate  Airway patency: patent    PONV status at discharge: No PONV  Anesthetic complications: no      Cardiovascular status: blood pressure returned to baseline  Respiratory status: unassisted  Hydration status: euvolemic  Follow-up not needed.              Vitals Value Taken Time   /68 05/21/25 11:24   Temp  05/21/25 15:29   Pulse 74 05/21/25 11:24   Resp 18 05/21/25 11:24   SpO2 95 % 05/21/25 11:24         No case tracking events are documented in the log.      Pain/Salina Score: Salina Score: 10 (5/21/2025 11:24 AM)

## 2025-05-21 NOTE — NURSING
Patient discharged per MD orders. Instructions given on medications, wound care, activity, signs of infection, when to call MD, and follow up appointments. Pt and sisters verbalized understanding. Telemetry and PIV removed. Patient transferred off of unit via wheelchair by transporter.

## 2025-05-21 NOTE — DISCHARGE INSTRUCTIONS
Medications:  - START flecainide 100mg twice daily (morning and evening).  - DECREASE metoprolol to 50mg once daily.    Continue to take all other home medications as listed on your medication list after you are discharged.    Diet  -You may resume oral intake after you are discharged, as long you have no swallowing difficulties.    Because you have received sedation for this procedure:  -Limit activity for the remainder of the day.  -Do not smoke for at least 6 hours and until you are fully awake and alert.  -Do not drink alcoholic beverage for 24 hours.  -Do not drive for 24 hours.  -Defer important decision making until the following day.     Go to the Emergency Department if you develop:   -Bleeding  -Weakness or numbness  -Visual, gait or speech disturbance  -New chest pain, palpitations, shortness of breath, rapid heart beat, or fainting  -Fever    Follow up:  -EKG in 1 week.  -Ablation scheduled for 7/31/25.    Any need to reschedule or cancel procedures, or any questions regarding your procedures should be addressed directly with the Arrhythmia Department Nurses at the following phone number: [phone].

## 2025-05-21 NOTE — H&P
[facility]  Cardiology  TAYLA/DCCV History & Physical      Yeny Hargrove  YOB: 1960  Medical Record Number:  [id number]  Attending Physician:  Jermaine Vera MD   Date of Admission: 5/21/2025       Hospital Day:  0  Current Principal Problem:  Atrial fibrillation      History     Cc: TAYLA/DCCV for AF    HPI  Last OV with Dr. Vera on 5/9/25.   65 year-old woman with hypertension, type 2 DM, breast cancer, obesity and paroxysmal atrial fibrillation. First presented to the hospital with palpitations in 3/2022. Underwent DCCV with Dr. Benavidez. Was started on xarelto. Had another paroxysm in December of 2022. At some point she stopped it due to cost. Returned to the ER in February 2025 with shortness of breath. CTA PE protocol was negative. Rhythm was sinus tachycardia. Discharged from ER. 1 week later diagnosed with COVID. She was referred to discuss her AF in March of 2025. She resumed xarelto. She reported no AF symptoms since 12/2022. She was concerned with cost of xarelto. Offered to switch to pradaxa.    Interim Hx:  Mrs Hargrove returned for follow-up after being observed to be in AF with RVR at a clinic visit. She was not taking her anticoagulation. Metoprolol was increased.    Today, here for TAYLA/DCCV. No acute complaints.    Rate/rhythm control: toprol 100mg qd  Anticoagulant/antiplatelets: pradaxa  ECG: AF, 123 bpm  Platelet count: 280  INR: 1.1    History of stroke:  no  Dysphagia or odynophagia:  no  Liver Disease, esophageal disease, or known varices:  no  Upper GI Bleeding:  no  Snoring:  no   Sleep Apnea:  no  History of anesthetic difficulties:  no  Last oral intake: last pm   Able to move neck in all directions:  yes  GLP-1 Use: no        Medications - Outpatient  Prior to Admission medications    Medication Sig Start Date End Date Taking? Authorizing Provider   albuterol (PROVENTIL/VENTOLIN HFA) 90 mcg/actuation inhaler Inhale 2 puffs into the lungs every 6  "(six) hours as needed for Wheezing or Shortness of Breath. Rescue 3/31/25   Ronit Clements PA-C   albuterol-ipratropium (DUO-NEB) 2.5 mg-0.5 mg/3 mL nebulizer solution Take 3 mLs by nebulization every 6 (six) hours as needed for Wheezing. Rescue 1/16/25 1/16/26  Royal Iniguez DO   amitriptyline (ELAVIL) 150 MG Tab Take 150 mg by mouth every evening. 1/31/25   Provider, Historical   amlodipine-valsartan (EXFORGE)  mg per tablet Take 1 tablet by mouth once daily. 3/31/25 3/31/26  Ronit Clements PA-C   anastrozole (ARIMIDEX) 1 mg Tab Take 1 tablet (1 mg total) by mouth once daily. 1/10/25   Abe Herndon NP   atorvastatin (LIPITOR) 80 MG tablet Take 1 tablet (80 mg total) by mouth once daily. 1/16/25   Royal Iniguez DO   azelastine (ASTELIN) 137 mcg (0.1 %) nasal spray 1 spray (137 mcg total) by Nasal route 2 (two) times daily. 4/9/25 4/9/26  Ronit Clements PA-C   cetirizine (ZYRTEC) 10 MG tablet Take 1 tablet (10 mg total) by mouth once daily. 4/9/25 5/9/25  Ronit Clements PA-C   dabigatran etexilate (PRADAXA) 150 mg Cap Take 1 capsule (150 mg total) by mouth 2 (two) times a day. "Do NOT break, chew, or open capsules." 5/9/25   Jermaine Vera MD   diclofenac sodium (VOLTAREN ARTHRITIS PAIN) 1 % Gel Apply 2 g topically 2 (two) times daily. 2/26/25   Ronit Clements PA-C   fluticasone furoate (ARNUITY ELLIPTA) 100 mcg/actuation inhaler Inhale 1 puff into the lungs once daily. Controller 1/16/25   Royal Iniguez DO   fluticasone propionate (FLONASE) 50 mcg/actuation nasal spray fluticasone propionate 50 mcg/actuation nasal spray,suspension    Provider, Historical   ipratropium (ATROVENT) 21 mcg (0.03 %) nasal spray 2 sprays by Each Nostril route 2 (two) times daily. 4/9/25   Ronit Clements PA-C   metFORMIN (GLUCOPHAGE-XR) 500 MG ER 24hr tablet Take 1 tablet (500 mg total) by mouth daily with breakfast. 1/16/25 1/16/26  Royal Iniguez,    metoprolol succinate (TOPROL-XL) " 100 MG 24 hr tablet Take 1 tablet (100 mg total) by mouth once daily. 4/9/25 4/9/26  Ronit Clements PA-C   pregabalin (LYRICA) 100 MG capsule Take 1 capsule (100 mg total) by mouth 2 (two) times daily. 4/9/25 6/8/25  Ronit Clements PA-C         Medications - Current  Scheduled Meds:  Continuous Infusions:  PRN Meds:.      Allergies  Review of patient's allergies indicates:   Allergen Reactions    Iodine and iodide containing products Swelling    Shellfish containing products Swelling    Shellfish derived Swelling    Iodinated contrast media Swelling    Pcn [penicillins] Swelling         Past Medical History  Past Medical History:   Diagnosis Date    Asthma     Breast cancer     Coronary artery disease     Diabetes mellitus     Estrogen receptor positive 06/05/2023    Hypertension          Past Surgical History  Past Surgical History:   Procedure Laterality Date    BREAST LUMPECTOMY      HYSTERECTOMY      MASTECTOMY      TREATMENT OF CARDIAC ARRHYTHMIA N/A 3/16/2022    Procedure: Cardioversion or Defibrillation;  Surgeon: Shanika Benavidez MD;  [facility]EP;  Service: Cardiology;  Laterality: N/A;         Social History  Social History     Socioeconomic History    Marital status: Single   Tobacco Use    Smoking status: Never     Passive exposure: Past    Smokeless tobacco: Never   Vaping Use    Vaping status: Never Used   Substance and Sexual Activity    Alcohol use: No    Drug use: No    Sexual activity: Never     Birth control/protection: None   Social History Narrative    1/16/2025: Lives in [place] by herself, looking to get a dog. Works in a restaurant kitchen.          ROS  Review of Systems   Constitutional: Negative for chills.   HENT: Negative.     Eyes: Negative.    Cardiovascular:  Negative for chest pain, dyspnea on exertion and palpitations.   Respiratory: Negative.  Negative for shortness of breath and sleep disturbances due to breathing.    Endocrine: Negative.   "  Musculoskeletal: Negative.    Gastrointestinal:  Negative for hematemesis, melena, nausea and vomiting.   Genitourinary: Negative.    Neurological: Negative.    Psychiatric/Behavioral: Negative.  Negative for altered mental status.    Allergic/Immunologic: Negative.    Physical Examination     Vital Signs  24 Hour VS Range           Physical Exam:   Physical Exam  Constitutional:       General: She is not in acute distress.     Appearance: Normal appearance.   HENT:      Head: Normocephalic.      Mouth/Throat:      Mouth: Mucous membranes are moist.   Cardiovascular:      Rate and Rhythm: Tachycardia present. Rhythm irregular.   Pulmonary:      Effort: Pulmonary effort is normal.   Musculoskeletal:      Right lower leg: No edema.      Left lower leg: No edema.   Neurological:      Mental Status: She is alert and oriented to person, place, and time.           Data       Recent Labs   Lab 05/16/25  0821   WBC 6.04   HGB 13.2   HCT 41.9           Recent Labs   Lab 05/16/25  0821   PROTIME 12.2   INR 1.1        Recent Labs   Lab 05/16/25  0821      K 4.5      CO2 30*   BUN 10   CREATININE 1.0   ANIONGAP 9   CALCIUM 9.5        Recent Labs   Lab 05/16/25  0821   PROT 7.2   ALBUMIN 4.0   BILITOT 0.5   ALKPHOS 90   AST 17   ALT 13        No results for input(s): "TROPONINI" in the last 168 hours.     BNP (pg/mL)   Date Value   02/03/2025 31   01/16/2025 39   12/06/2022 214 (H)       No results for input(s): "LABBLOO" in the last 168 hours.         Assessment & Plan     #Atrial fibrillation  Dr. Vera Plan from 5/9/25:   She is interested in ablation. Discussed risks/benefits. She elects to proceed. Will also cardiovert soon as I do not want to leave her in this until the ablation.  Plan  TAYLA/DCCV      TTE 1/30/25:    Aorta: Aortic root is normal in size measuring 2.86 cm. Ascending aorta is moderately dilated measuring 4.8 cm.    Aortic Valve: There is moderate aortic regurgitation.    Left Ventricle: " The left ventricle is normal in size. Mildly increased wall thickness. Normal wall motion. There is normal systolic function. Quantitated ejection fraction is 55%. Global longitudinal strain is -18.0%. Global longitudinal strain is normal. Grade I diastolic dysfunction.    Right Ventricle: Normal right ventricular cavity size. Systolic function is normal.      -No absolute contraindications of esophageal stricture, tumor, perforation, laceration,or diverticulum and/or active GI bleed.  -The risks, benefits & alternatives of the procedure were explained to the patient.  -The risks of transesophageal echo include but are not limited to:  Dental trauma, esophageal trauma/perforation, bleeding, laryngospasm/brochospasm, aspiration, sore throat/hoarseness, & dislodgement of the endotracheal tube/nasogastric tube (where applicable).  -The risks of moderate sedation include hypotension, respiratory depression, arrhythmias, bronchospasm, & death.  -Prior to procedure, extensive discussion with patient regarding risks and benefits of DCCV at bedside today. The patient voices understanding, all questions have been answered, and patient would like to proceed.  -Informed consent was obtained. The patient is agreeable to proceed with the procedure and all questions and concerns addressed.    Case was discussed with an attending physician prior to procedure.    Ronit Phoenix PA-C  [facility]

## 2025-05-28 ENCOUNTER — HOSPITAL ENCOUNTER (OUTPATIENT)
Dept: CARDIOLOGY | Facility: HOSPITAL | Age: 65
Discharge: HOME | End: 2025-05-28
Attending: INTERNAL MEDICINE
Payer: MEDICARE

## 2025-05-28 DIAGNOSIS — I48.0 PAROXYSMAL ATRIAL FIBRILLATION: ICD-10-CM

## 2025-05-28 LAB
OHS QRS DURATION: 92 MS
OHS QTC CALCULATION: 422 MS

## 2025-05-28 PROCEDURE — 93005 ELECTROCARDIOGRAM TRACING: CPT | Mod: PN

## 2025-05-28 PROCEDURE — 93010 ELECTROCARDIOGRAM REPORT: CPT | Mod: ,,, | Performed by: INTERNAL MEDICINE

## 2025-05-29 NOTE — TELEPHONE ENCOUNTER
Recurrent perirectal abscess.  Status post surgical I and D right now.   Continue empiric IV vancomycin and Levaquin.  IV hydration.   Patient has never has a colonoscopy- patient will need one as outpatient to rule out underlying IBD.   Check HIV/ HCV/HBV/ A1c.  Urine drug test.     Attempted to contact patient regarding 1/10/25 appointment with Abe Herndon NP. No answer, and vm box was full at the time of phone call.

## 2025-06-09 ENCOUNTER — PATIENT MESSAGE (OUTPATIENT)
Dept: ADMINISTRATIVE | Facility: HOSPITAL | Age: 65
End: 2025-06-09
Payer: MEDICARE

## 2025-06-10 ENCOUNTER — OFFICE VISIT (OUTPATIENT)
Dept: FAMILY MEDICINE | Facility: CLINIC | Age: 65
End: 2025-06-10
Payer: MEDICARE

## 2025-06-10 VITALS
OXYGEN SATURATION: 96 % | SYSTOLIC BLOOD PRESSURE: 120 MMHG | WEIGHT: 241.88 LBS | BODY MASS INDEX: 41.29 KG/M2 | DIASTOLIC BLOOD PRESSURE: 86 MMHG | HEART RATE: 70 BPM | HEIGHT: 64 IN

## 2025-06-10 DIAGNOSIS — I10 ESSENTIAL HYPERTENSION: ICD-10-CM

## 2025-06-10 DIAGNOSIS — E78.49 OTHER HYPERLIPIDEMIA: ICD-10-CM

## 2025-06-10 DIAGNOSIS — R05.9 COUGH, UNSPECIFIED TYPE: ICD-10-CM

## 2025-06-10 DIAGNOSIS — E11.9 TYPE 2 DIABETES MELLITUS WITHOUT COMPLICATION, WITHOUT LONG-TERM CURRENT USE OF INSULIN: Primary | ICD-10-CM

## 2025-06-10 DIAGNOSIS — M54.16 LUMBAR RADICULOPATHY: ICD-10-CM

## 2025-06-10 PROCEDURE — 99214 OFFICE O/P EST MOD 30 MIN: CPT | Mod: S$GLB,,,

## 2025-06-10 PROCEDURE — 3288F FALL RISK ASSESSMENT DOCD: CPT | Mod: CPTII,S$GLB,,

## 2025-06-10 PROCEDURE — 99999 PR PBB SHADOW E&M-EST. PATIENT-LVL V: CPT | Mod: PBBFAC,,,

## 2025-06-10 PROCEDURE — G2211 COMPLEX E/M VISIT ADD ON: HCPCS | Mod: S$GLB,,,

## 2025-06-10 PROCEDURE — 3066F NEPHROPATHY DOC TX: CPT | Mod: CPTII,S$GLB,,

## 2025-06-10 PROCEDURE — 3074F SYST BP LT 130 MM HG: CPT | Mod: CPTII,S$GLB,,

## 2025-06-10 PROCEDURE — 3079F DIAST BP 80-89 MM HG: CPT | Mod: CPTII,S$GLB,,

## 2025-06-10 PROCEDURE — 3008F BODY MASS INDEX DOCD: CPT | Mod: CPTII,S$GLB,,

## 2025-06-10 PROCEDURE — 3061F NEG MICROALBUMINURIA REV: CPT | Mod: CPTII,S$GLB,,

## 2025-06-10 PROCEDURE — 1101F PT FALLS ASSESS-DOCD LE1/YR: CPT | Mod: CPTII,S$GLB,,

## 2025-06-10 PROCEDURE — 3051F HG A1C>EQUAL 7.0%<8.0%: CPT | Mod: CPTII,S$GLB,,

## 2025-06-10 PROCEDURE — 1160F RVW MEDS BY RX/DR IN RCRD: CPT | Mod: CPTII,S$GLB,,

## 2025-06-10 PROCEDURE — 4010F ACE/ARB THERAPY RXD/TAKEN: CPT | Mod: CPTII,S$GLB,,

## 2025-06-10 PROCEDURE — 1159F MED LIST DOCD IN RCRD: CPT | Mod: CPTII,S$GLB,,

## 2025-06-10 RX ORDER — BENZONATATE 100 MG/1
100 CAPSULE ORAL 3 TIMES DAILY PRN
Qty: 30 CAPSULE | Refills: 0 | Status: SHIPPED | OUTPATIENT
Start: 2025-06-10 | End: 2025-06-20

## 2025-06-10 RX ORDER — PREGABALIN 150 MG/1
150 CAPSULE ORAL 2 TIMES DAILY
Qty: 60 CAPSULE | Refills: 1 | Status: SHIPPED | OUTPATIENT
Start: 2025-06-10 | End: 2025-08-09

## 2025-06-10 RX ORDER — METFORMIN HYDROCHLORIDE 500 MG/1
1000 TABLET, EXTENDED RELEASE ORAL 2 TIMES DAILY WITH MEALS
Qty: 360 TABLET | Refills: 3 | Status: SHIPPED | OUTPATIENT
Start: 2025-06-10 | End: 2026-06-10

## 2025-06-10 NOTE — PROGRESS NOTES
"Subjective     Patient ID: Yeny Hargrove is a 65 y.o. female.    Chief Complaint: Follow-up      HPI  64 y/o female with DM, HTN, HLD, Afib, JOSSIE, GERD, and Vit D def presents to clinic for follow up.     HTN.:  takes exforge 10/320 and metoprolol 50 mg. Blood pressure in clinic 120/86    DM: on metformin 500 BID. A1C increased to 7.1. Pt states that she has been eating poorly    Last visit she was in Afib. Saw cardiology. Had cardioversion. She has ablation scheduled for July     DLD: taking statin  5/2025. Taking atorvastatin 80 mg    States that she is feeling great     Labs reviewed    Review of Systems   Constitutional:  Negative for fatigue and fever.   Respiratory:  Negative for cough, shortness of breath and wheezing.    Cardiovascular:  Negative for chest pain, palpitations and leg swelling.   Gastrointestinal:  Negative for diarrhea, nausea and vomiting.   Integumentary:  Negative for rash.   Neurological:  Negative for dizziness, light-headedness and headaches.          Objective     Vitals:    06/10/25 1038 06/10/25 1107   BP: (!) 144/90 120/86   Pulse: 70    SpO2: 96%    Weight: 109.7 kg (241 lb 13.5 oz)    Height: 5' 4" (1.626 m)    PainSc: 0-No pain       Physical Exam  Vitals reviewed.   Constitutional:       General: She is not in acute distress.     Appearance: She is not toxic-appearing.   HENT:      Head: Normocephalic and atraumatic.   Eyes:      General:         Right eye: No discharge.         Left eye: No discharge.      Conjunctiva/sclera: Conjunctivae normal.   Cardiovascular:      Rate and Rhythm: Normal rate and regular rhythm.   Pulmonary:      Effort: No respiratory distress.      Breath sounds: No wheezing.   Lymphadenopathy:      Cervical: No cervical adenopathy.   Skin:     Findings: No rash.   Neurological:      Mental Status: She is alert and oriented to person, place, and time.              Assessment and Plan     1. Type 2 diabetes mellitus without complication, without " long-term current use of insulin  -     metFORMIN (GLUCOPHAGE-XR) 500 MG ER 24hr tablet; Take 2 tablets (1,000 mg total) by mouth 2 (two) times daily with meals.  Dispense: 360 tablet; Refill: 3  -     CBC Auto Differential; Future; Expected date: 06/10/2025  -     Comprehensive Metabolic Panel; Future; Expected date: 06/10/2025  -     Hemoglobin A1C; Future; Expected date: 06/10/2025    2. Essential hypertension    3. Other hyperlipidemia    4. Cough, unspecified type  -     benzonatate (TESSALON) 100 MG capsule; Take 1 capsule (100 mg total) by mouth 3 (three) times daily as needed for Cough.  Dispense: 30 capsule; Refill: 0    5. Lumbar radiculopathy  -     pregabalin (LYRICA) 150 MG capsule; Take 1 capsule (150 mg total) by mouth 2 (two) times daily.  Dispense: 60 capsule; Refill: 1      HTN: At goal today in clinic. Will continue current regimen     DLD: will continue current regimen    DM: A1C elevated. States that she has been eating poorly. She is on Metformin 500 mg BID. Will increase to 1,000 mg BID. Discussed importance of low carb, low sugar diet       Continue Metoprolol 50 mg daily  Continue amlodipine -valsartan to 10/320 daily   Continue taking Bp daily. Write down on log   Continue statin  Continue xarelto   Increase Metformin to 1,000 BID    Cardiology in July, labs prior  Appt with Ronit in August, labs prior     30 minutes of total time spent on the encounter, which includes face to face time and non-face to face time preparing to see the patient (eg, review of tests), Obtaining and/or reviewing separately obtained history, Documenting clinical information in the electronic or other health record, Independently interpreting results (not separately reported) and communicating results to the patient/family/caregiver, or Care coordination (not separately reported).      Ronit Clements PA-C  Family Practice/Internal Medicine   Office Phone: [phone]

## 2025-06-10 NOTE — PATIENT INSTRUCTIONS
Continue Metoprolol 50 mg daily  Continue amlodipine -valsartan to 10/320 daily   Continue taking Bp daily. Write down on log   Continue statin  Continue xarelto   Increase Metformin to 1,000 BID    Cardiology in July   Appt with Ronit in August, labs prior

## 2025-06-21 ENCOUNTER — HOSPITAL ENCOUNTER (EMERGENCY)
Facility: HOSPITAL | Age: 65
Discharge: HOME | End: 2025-06-21
Attending: EMERGENCY MEDICINE
Payer: MEDICARE

## 2025-06-21 VITALS
DIASTOLIC BLOOD PRESSURE: 96 MMHG | OXYGEN SATURATION: 96 % | HEART RATE: 80 BPM | HEIGHT: 64 IN | WEIGHT: 240 LBS | TEMPERATURE: 98 F | RESPIRATION RATE: 19 BRPM | SYSTOLIC BLOOD PRESSURE: 159 MMHG | BODY MASS INDEX: 40.97 KG/M2

## 2025-06-21 DIAGNOSIS — G47.00 INSOMNIA, UNSPECIFIED TYPE: Primary | ICD-10-CM

## 2025-06-21 DIAGNOSIS — F41.9 ANXIETY: ICD-10-CM

## 2025-06-21 PROCEDURE — 63600175 PHARM REV CODE 636 W HCPCS: Mod: ER

## 2025-06-21 PROCEDURE — 99283 EMERGENCY DEPT VISIT LOW MDM: CPT | Mod: 25,ER

## 2025-06-21 PROCEDURE — 93010 ELECTROCARDIOGRAM REPORT: CPT | Mod: ,,, | Performed by: INTERNAL MEDICINE

## 2025-06-21 PROCEDURE — 25000003 PHARM REV CODE 250: Mod: ER

## 2025-06-21 PROCEDURE — 93005 ELECTROCARDIOGRAM TRACING: CPT | Mod: ER

## 2025-06-21 RX ORDER — DIPHENHYDRAMINE HCL 25 MG
25 CAPSULE ORAL
Status: COMPLETED | OUTPATIENT
Start: 2025-06-21 | End: 2025-06-21

## 2025-06-21 RX ORDER — PROCHLORPERAZINE MALEATE 5 MG
10 TABLET ORAL
Status: COMPLETED | OUTPATIENT
Start: 2025-06-21 | End: 2025-06-21

## 2025-06-21 RX ORDER — HYDROXYZINE PAMOATE 50 MG/1
50 CAPSULE ORAL EVERY 6 HOURS PRN
Qty: 20 CAPSULE | Refills: 0 | Status: SHIPPED | OUTPATIENT
Start: 2025-06-21 | End: 2025-06-24 | Stop reason: SDUPTHER

## 2025-06-21 RX ORDER — KETOROLAC TROMETHAMINE 10 MG/1
10 TABLET, FILM COATED ORAL
Status: COMPLETED | OUTPATIENT
Start: 2025-06-21 | End: 2025-06-21

## 2025-06-21 RX ADMIN — PROCHLORPERAZINE MALEATE 10 MG: 5 TABLET ORAL at 10:06

## 2025-06-21 RX ADMIN — KETOROLAC TROMETHAMINE 10 MG: 10 TABLET, FILM COATED ORAL at 10:06

## 2025-06-21 RX ADMIN — DIPHENHYDRAMINE HYDROCHLORIDE 25 MG: 25 CAPSULE ORAL at 10:06

## 2025-06-21 NOTE — ED PROVIDER NOTES
"Encounter Date: 6/21/2025       History     Chief Complaint   Patient presents with    Anxiety     Pt C/O anxiety with insomnia X 1 week.      Yeny Hargrove is a 65 y.o. female who has a past medical history of Asthma, Breast cancer, Coronary artery disease, Diabetes mellitus, Estrogen receptor positive, and Hypertension. presenting to the Emergency Department for anxiety. Patient reports she has been unable to sleep for about a week now due to anxiety. She is unable to identify any stressors or triggers. She has previously been prescribed amitriptyline 150 mg for insomnia, but reports she discontinued the medication a month ago because it "didn't make her head feel right." She reports she is straining from the R eye for about two months and had an appointment with here eye doctor that she missed, but she denies any changes in her vision. Denies any eye pain. She reports a sensation to her head that she can best describe as burning. Denies headache. Denies nausea, vomiting, paresthesias, weakness, slurred speech. Denies CP, SOB, palpitations. Denies urinary s/s. She is scheduled for an ablation July 31 for afib. Denies SI, HI, AVH. Her sister and son are here with her. She reports they are a good support system.        The history is provided by the patient.     Review of patient's allergies indicates:   Allergen Reactions    Iodine and iodide containing products Swelling    Shellfish containing products Swelling    Shellfish derived Swelling    Iodinated contrast media Swelling    Pcn [penicillins] Swelling     Past Medical History:   Diagnosis Date    Asthma     Breast cancer     Coronary artery disease     Diabetes mellitus     Estrogen receptor positive 06/05/2023    Hypertension      Past Surgical History:   Procedure Laterality Date    BREAST LUMPECTOMY      ECHOCARDIOGRAM,TRANSESOPHAGEAL N/A 5/21/2025    Procedure: Transesophageal echo (TAYLA) intra-procedure log documentation;  Surgeon: Clark Ballard MD;  " Location: [facility] EP LAB;  Service: Cardiology;  Laterality: N/A;    HYSTERECTOMY      MASTECTOMY      TREATMENT OF CARDIAC ARRHYTHMIA N/A 3/16/2022    Procedure: Cardioversion or Defibrillation;  Surgeon: Shanika Benavidez MD;  Location: [facility] CATH LAB/EP;  Service: Cardiology;  Laterality: N/A;    TREATMENT OF CARDIAC ARRHYTHMIA N/A 2025    Procedure: Cardioversion or Defibrillation;  Surgeon: Jermaine Vera MD;  Location: [facility] EP LAB;  Service: Cardiology;  Laterality: N/A;  AF, TAYLA, DCCV, MAC, MO, 3 Prep     Family History   Problem Relation Name Age of Onset    Hypertension Mother      Breast cancer Other Maternal Aunt 70    Eclampsia Neg Hx      Diabetes Neg Hx      Colon cancer Neg Hx      Miscarriages / Stillbirths Neg Hx      Ovarian cancer Neg Hx       labor Neg Hx      Stroke Neg Hx       Social History[1]  Review of Systems   Constitutional:  Negative for chills and fever.   HENT:  Negative for congestion, ear pain, rhinorrhea and sore throat.    Eyes:  Negative for pain.        R eye strain for two months, but no pain, photophobia, discharge or visual disturbance.   Respiratory:  Negative for shortness of breath.    Cardiovascular:  Negative for chest pain and palpitations.   Gastrointestinal:  Negative for abdominal pain, diarrhea, nausea and vomiting.   Genitourinary:  Negative for dysuria.   Skin:  Negative for color change.   Neurological:  Negative for dizziness, weakness, light-headedness, numbness and headaches.   Psychiatric/Behavioral:  Negative for agitation and confusion.        Physical Exam     Initial Vitals [25 0955]   BP Pulse Resp Temp SpO2   (!) 176/122 80 19 97.9 °F (36.6 °C) 96 %      MAP       --         Physical Exam    Nursing note and vitals reviewed.  Constitutional: She appears well-developed and well-nourished. She is not diaphoretic.  Non-toxic appearance. No distress.   HENT:   Head: Normocephalic and atraumatic.   Right Ear: Hearing and external ear normal.    Left Ear: Hearing and external ear normal. Mouth/Throat: Oropharynx is clear and moist.   Eyes: Conjunctivae and EOM are normal. Pupils are equal, round, and reactive to light.   Neck: Neck supple.   Normal range of motion.  Cardiovascular:  Normal rate and regular rhythm.           Pulmonary/Chest: Breath sounds normal. No respiratory distress. She has no wheezes. She has no rales.   Abdominal: Abdomen is soft. She exhibits no distension. There is no abdominal tenderness.   Musculoskeletal:         General: Normal range of motion.      Cervical back: Normal range of motion and neck supple. Normal range of motion.     Neurological: She is alert and oriented to person, place, and time. GCS score is 15. GCS eye subscore is 4. GCS verbal subscore is 5. GCS motor subscore is 6.   Patient is alert, attentive, and oriented. Speech is clear and fluent. PERRLA. No facial droop, symmetric expressions. There is no pronator drift of out-stretched arms. Muscle bulk and tone are normal. Strength is full bilaterally. SILT in all four extremities. Rapid alternating movements and fine finger movements are intact. There is no dysmetria on finger-to-nose and heel-knee-shin. Gait is steady with normal steps, no ataxia.     Skin: Skin is warm and dry.   Psychiatric: She has a normal mood and affect. Her speech is normal and behavior is normal. Judgment and thought content normal. She expresses no homicidal and no suicidal ideation. She expresses no suicidal plans and no homicidal plans.         ED Course   Procedures  Labs Reviewed - No data to display     ECG Results              EKG 12-lead (In process)        Collection Time Result Time QRS Duration OHS QTC Calculation    06/21/25 10:38:40 06/21/25 12:11:09 168 530                     In process by Interface, Lab In [facility] (06/21/25 12:11:18)                   Narrative:    Test Reason : F41.9,    Vent. Rate :  85 BPM     Atrial Rate :  85 BPM     P-R Int : 194 ms          QRS  Dur : 168 ms      QT Int : 446 ms       P-R-T Axes :  18  71 -19 degrees    QTcB Int : 530 ms    Normal sinus rhythm  Left bundle branch block  Abnormal ECG  When compared with ECG of 28-May-2025 10:10,  Left bundle branch block is now Present  Criteria for Septal infarct are no longer Present    Referred By:            Confirmed By:                                   Imaging Results    None          Medications   ketorolac tablet 10 mg (10 mg Oral Given 6/21/25 1053)   diphenhydrAMINE capsule 25 mg (25 mg Oral Given 6/21/25 1053)   prochlorperazine tablet 10 mg (10 mg Oral Given 6/21/25 1053)     Medical Decision Making  Well appearing 64 yo female presents for anxiety. See full HPI. She is hypertensive, but otherwise vitals normal. Neuro intact. EOMI, PERRLA. Normal visual fields. Normal affect. Will obtain EKG, though normal rate and rhythm on exam and no cardiac symptoms. Discussed with Dr. Méndez who agrees no emergent workup indicated. She does have chronic insomnia. Will treat patient's pain and anxiety with toradol, compazine, benadyl. Rx for atarax. Close follow up advised with PCP. Patient and family in agreement with this plan. All questions answered.     Differential Diagnosis includes, but is not limited to:  Anxiety, arrhythmia, insomnia, stress reaction, headache, not consistent with acute stroke, MI/ACS, other cardiopulm malignant condition    Problems Addressed:  Anxiety: acute illness or injury  Insomnia, unspecified type: acute illness or injury    Amount and/or Complexity of Data Reviewed  External Data Reviewed: notes.     Details: Recent cardioversion. Scheduled for ablation July 31 for persistent afib      Risk  OTC drugs.  Prescription drug management.              Attending Attestation:             Attending ED Notes:   I discussed the management of this patient with the physician assistant but did not perform a face-to-face evaluation of this patient.      ED Course as of 06/21/25 9429    Sat Jun 21, 2025   1041 EKG independently interpreted by me pending Cardiology review:  Heart rate 85, normal sinus rhythm, left bundle branch block, no ectopy, no ST changes, normal axis [BB]      ED Course User Index  [BB] Franko Méndez MD                           Clinical Impression:  Final diagnoses:  [F41.9] Anxiety  [G47.00] Insomnia, unspecified type (Primary)          ED Disposition Condition    Discharge Stable          ED Prescriptions       Medication Sig Dispense Start Date End Date Auth. Provider    hydrOXYzine pamoate (VISTARIL) 50 MG Cap Take 1 capsule (50 mg total) by mouth every 6 (six) hours as needed. 20 capsule 6/21/2025 -- Deirdre Murguia PA-C          Follow-up Information       Follow up With Specialties Details Why Contact Info    Royal Iniguez DO Family Medicine Schedule an appointment as soon as possible for a visit   [address] [facility] [address]  [phone]                   Deirdre Murguia PA-C  06/21/25 1127         [1]   Social History  Tobacco Use    Smoking status: Never     Passive exposure: Past    Smokeless tobacco: Never   Vaping Use    Vaping status: Never Used   Substance Use Topics    Alcohol use: No    Drug use: No        Franko Méndez MD  06/21/25 1316

## 2025-06-21 NOTE — DISCHARGE INSTRUCTIONS
Use the hydroxyzine/atarax four times a day as needed for anxiety.     Call your primary care provider on Monday morning to get an appointment for closer follow up. Return to the ER if symptoms worsen, fail to improve or change in character

## 2025-06-21 NOTE — ED NOTES
Pt presents to ED with family, c/o of headache, anxiety and insomnia that started last Sunday. She does not remember when's the last time she had a full night of rest. She does report stress about her upcoming breast appointment and about her car being broke.

## 2025-06-22 LAB
OHS QRS DURATION: 168 MS
OHS QTC CALCULATION: 530 MS

## 2025-06-23 ENCOUNTER — TELEPHONE (OUTPATIENT)
Dept: FAMILY MEDICINE | Facility: CLINIC | Age: 65
End: 2025-06-23
Payer: MEDICARE

## 2025-06-23 NOTE — TELEPHONE ENCOUNTER
Copied from CRM #[id number]. Topic: Appointments - Appointment Scheduling  >> Jun 23, 2025  9:01 AM Andreina wrote:  Patient is requesting a sooner appointment.  Patient declined first available appointment listed as well as another facility and provider .  Patient will not accept being placed on the waitlist and is requesting a message be sent to doctor.      Name of Caller:  pt       When is the first available appointment?: 12/23      Symptoms: anxiety attack       Would the patient rather a call back or a response via [facility]?: call       Best Call Back Number:.[phone]

## 2025-06-23 NOTE — TELEPHONE ENCOUNTER
Return patient phone call. Spoke to patient. Patient stated she would like an appointment to follow up from when she went to hospital for anxiety attack. Appointment scheduled with RADHA Cottrell.

## 2025-06-24 ENCOUNTER — OFFICE VISIT (OUTPATIENT)
Dept: FAMILY MEDICINE | Facility: CLINIC | Age: 65
End: 2025-06-24
Payer: MEDICARE

## 2025-06-24 VITALS
WEIGHT: 237.44 LBS | HEART RATE: 84 BPM | SYSTOLIC BLOOD PRESSURE: 134 MMHG | OXYGEN SATURATION: 98 % | HEIGHT: 64 IN | BODY MASS INDEX: 40.54 KG/M2 | DIASTOLIC BLOOD PRESSURE: 86 MMHG

## 2025-06-24 DIAGNOSIS — M79.10 MUSCLE TENSION PAIN: ICD-10-CM

## 2025-06-24 DIAGNOSIS — F41.9 ANXIETY: Primary | ICD-10-CM

## 2025-06-24 DIAGNOSIS — M54.2 NECK PAIN: ICD-10-CM

## 2025-06-24 PROCEDURE — 99999 PR PBB SHADOW E&M-EST. PATIENT-LVL V: CPT | Mod: PBBFAC,,,

## 2025-06-24 PROCEDURE — 3008F BODY MASS INDEX DOCD: CPT | Mod: CPTII,S$GLB,,

## 2025-06-24 PROCEDURE — 3075F SYST BP GE 130 - 139MM HG: CPT | Mod: CPTII,S$GLB,,

## 2025-06-24 PROCEDURE — 3051F HG A1C>EQUAL 7.0%<8.0%: CPT | Mod: CPTII,S$GLB,,

## 2025-06-24 PROCEDURE — 3288F FALL RISK ASSESSMENT DOCD: CPT | Mod: CPTII,S$GLB,,

## 2025-06-24 PROCEDURE — 1160F RVW MEDS BY RX/DR IN RCRD: CPT | Mod: CPTII,S$GLB,,

## 2025-06-24 PROCEDURE — 3079F DIAST BP 80-89 MM HG: CPT | Mod: CPTII,S$GLB,,

## 2025-06-24 PROCEDURE — 99214 OFFICE O/P EST MOD 30 MIN: CPT | Mod: S$GLB,,,

## 2025-06-24 PROCEDURE — 3066F NEPHROPATHY DOC TX: CPT | Mod: CPTII,S$GLB,,

## 2025-06-24 PROCEDURE — 1159F MED LIST DOCD IN RCRD: CPT | Mod: CPTII,S$GLB,,

## 2025-06-24 PROCEDURE — 1101F PT FALLS ASSESS-DOCD LE1/YR: CPT | Mod: CPTII,S$GLB,,

## 2025-06-24 PROCEDURE — 3061F NEG MICROALBUMINURIA REV: CPT | Mod: CPTII,S$GLB,,

## 2025-06-24 PROCEDURE — 4010F ACE/ARB THERAPY RXD/TAKEN: CPT | Mod: CPTII,S$GLB,,

## 2025-06-24 RX ORDER — HYDROXYZINE PAMOATE 50 MG/1
50 CAPSULE ORAL EVERY 8 HOURS PRN
Qty: 90 CAPSULE | Refills: 0 | Status: SHIPPED | OUTPATIENT
Start: 2025-06-24 | End: 2025-07-24

## 2025-06-24 RX ORDER — TIZANIDINE 2 MG/1
2 TABLET ORAL 2 TIMES DAILY
Qty: 10 TABLET | Refills: 0 | Status: SHIPPED | OUTPATIENT
Start: 2025-06-24 | End: 2025-06-29

## 2025-06-24 NOTE — PROGRESS NOTES
"Subjective     Patient ID: Yeny Hargrove is a 65 y.o. female.    Chief Complaint: Anxiety      HPI  64 y/o female with DM, HTN, HLD, Afib, JOSSIE, GERD, and Vit D def presents to clinic for anxiety     Was seen in ED 3 days ago   Work up negative  Was started on hydroxyzine   States that she has only taken a few pills and has not taken consistently  States that she is having neck pain that shoots up to her head    She is anxious bc her car is broken and she has to rely on others for rides  States that she is now anxious when she is home   Can't sleep at night     Denies CP, SOB, N/V/D, fever, blurred vision     Review of Systems   Constitutional:  Negative for fatigue and fever.   Respiratory:  Negative for cough, shortness of breath and wheezing.    Cardiovascular:  Negative for chest pain, palpitations and leg swelling.   Gastrointestinal:  Negative for constipation, diarrhea, nausea and vomiting.   Musculoskeletal:  Positive for neck pain.   Neurological:  Negative for dizziness, light-headedness and headaches.   Psychiatric/Behavioral:  The patient is nervous/anxious.           Objective     Vitals:    06/24/25 1311   BP: 134/86   Pulse: 84   SpO2: 98%   Weight: 107.7 kg (237 lb 7 oz)   Height: 5' 4" (1.626 m)   PainSc: 0-No pain      Physical Exam  Vitals reviewed.   Constitutional:       General: She is not in acute distress.     Appearance: She is not toxic-appearing.   HENT:      Head: Normocephalic and atraumatic.   Eyes:      General:         Right eye: No discharge.         Left eye: No discharge.      Conjunctiva/sclera: Conjunctivae normal.   Cardiovascular:      Rate and Rhythm: Normal rate and regular rhythm.   Pulmonary:      Effort: Pulmonary effort is normal. No respiratory distress.      Breath sounds: Normal breath sounds. No wheezing.   Musculoskeletal:         General: Normal range of motion.      Comments: Trap muscle spam    Lymphadenopathy:      Cervical: No cervical adenopathy.   Neurological: "      Mental Status: She is alert and oriented to person, place, and time.              Assessment and Plan     1. Anxiety  -     hydrOXYzine pamoate (VISTARIL) 50 MG Cap; Take 1 capsule (50 mg total) by mouth every 8 (eight) hours as needed (for anxiety).  Dispense: 90 capsule; Refill: 0    2. Neck pain  -     tiZANidine (ZANAFLEX) 2 MG tablet; Take 1 tablet (2 mg total) by mouth 2 (two) times a day. for 5 days  Dispense: 10 tablet; Refill: 0    3. Muscle tension pain  -     tiZANidine (ZANAFLEX) 2 MG tablet; Take 1 tablet (2 mg total) by mouth 2 (two) times a day. for 5 days  Dispense: 10 tablet; Refill: 0      Anxiety: Continue hydroxyzine. Told pt to take this regularly. Refilled for 30 days. Will reevaluate at next visit     Muscle tension/ neck pain: Pt has tight trapezius bilaterally on PE. She states that she gets a shooting pain up her neck to her head. Will start on short dose of muscle relaxer for this. Also recommended heat and massage    Hydroxyzine TID  Zanaflex nightly for neck muscle spasm. Do not drive while taking this medication  Massage  Warm compresses    Appt with Ronit in August     30 minutes of total time spent on the encounter, which includes face to face time and non-face to face time preparing to see the patient (eg, review of tests), Obtaining and/or reviewing separately obtained history, Documenting clinical information in the electronic or other health record, Independently interpreting results (not separately reported) and communicating results to the patient/family/caregiver, or Care coordination (not separately reported).      Ronit Clements PA-C  Family Practice/Internal Medicine   Office Phone: [phone]

## 2025-06-24 NOTE — PATIENT INSTRUCTIONS
Hydroxyzine TID  Zanaflex nightly for neck muscle spasm. Do not drive while taking this medication  Massage  Warm compresses    Appt with Ronit in August

## 2025-07-10 DIAGNOSIS — E78.49 OTHER HYPERLIPIDEMIA: ICD-10-CM

## 2025-07-10 NOTE — TELEPHONE ENCOUNTER
No care due was identified.  [facility] Embedded Care Due Messages. Reference number: [id number].   7/10/2025 3:25:01 PM CDT

## 2025-07-11 RX ORDER — ATORVASTATIN CALCIUM 80 MG/1
80 TABLET, FILM COATED ORAL
Qty: 90 TABLET | Refills: 1 | Status: SHIPPED | OUTPATIENT
Start: 2025-07-11

## 2025-07-11 NOTE — TELEPHONE ENCOUNTER
Refill Decision Note   Yeny Hargrove  is requesting a refill authorization.  Brief Assessment and Rationale for Refill:  Approve     Medication Therapy Plan:  06/21/25 EDv docs reviewed. no change to pended med      Comments:     Note composed:8:36 AM 07/11/2025

## 2025-07-16 ENCOUNTER — TELEPHONE (OUTPATIENT)
Dept: FAMILY MEDICINE | Facility: CLINIC | Age: 65
End: 2025-07-16
Payer: MEDICARE

## 2025-07-16 DIAGNOSIS — F41.9 ANXIETY: ICD-10-CM

## 2025-07-16 DIAGNOSIS — U07.1 COVID: Primary | ICD-10-CM

## 2025-07-16 RX ORDER — BUSPIRONE HYDROCHLORIDE 5 MG/1
5 TABLET ORAL 2 TIMES DAILY
Qty: 60 TABLET | Refills: 0 | Status: SHIPPED | OUTPATIENT
Start: 2025-07-16 | End: 2026-07-16

## 2025-07-16 NOTE — TELEPHONE ENCOUNTER
Copied from CRM #[id number]. Topic: General Inquiry - Return Call  >> Jul 16, 2025 12:09 PM Andreina wrote:  Name of Who is Calling: pt       What is the request in detail: pt stated she do have Covid and wanted the doctor to know. Pt also stated the anxiety medication is not working. Please contact to further discuss and advise.          Can the clinic reply by NATHALIENER: call       [facility][phone]

## 2025-07-16 NOTE — TELEPHONE ENCOUNTER
Send anti viral against covid  Tylenol for pain and fever control  If worsening SOB, go to the er    For anxiety start buspar; take 5 mg once daily x 1 week then increase to twice daily indefinitely    Keep f/u with cristian.

## 2025-07-16 NOTE — TELEPHONE ENCOUNTER
States she has tested + for Covid today. Tested herself at home. She is SOB, and has a sore throat. Also states that the anxiety medication is not helping her. Taking it three X's a day as ordered.

## 2025-07-16 NOTE — TELEPHONE ENCOUNTER
Copied from CRM #[id number]. Topic: Medications - Pharmacy  >> Jul 16, 2025  4:04 PM Jasmin wrote:  Type: Pharmacy calling to clarify an Rx    Name of caller: Adriana     Pharmacy name: [facility]     Prescription name:molnupiravir 200 mg capsule (EUA    What do they need to clarify? Copay is over $300 ; requesting something different ; please advise     Best call back number:[phone]    Additional information:  [facility] [address] [place] [address] [place] [place] [facility] [address]  Phone: [phone] Fax: [phone]

## 2025-07-21 ENCOUNTER — LAB VISIT (OUTPATIENT)
Dept: LAB | Facility: HOSPITAL | Age: 65
End: 2025-07-21
Attending: INTERNAL MEDICINE
Payer: MEDICARE

## 2025-07-21 DIAGNOSIS — I48.0 PAROXYSMAL ATRIAL FIBRILLATION: ICD-10-CM

## 2025-07-21 LAB
ABSOLUTE EOSINOPHIL (OHS): 0.09 K/UL
ABSOLUTE MONOCYTE (OHS): 0.54 K/UL (ref 0.3–1)
ABSOLUTE NEUTROPHIL COUNT (OHS): 1.79 K/UL (ref 1.8–7.7)
ANION GAP (OHS): 5 MMOL/L (ref 8–16)
APTT PPP: 28.8 SECONDS (ref 21–32)
BASOPHILS # BLD AUTO: 0.05 K/UL
BASOPHILS NFR BLD AUTO: 1 %
BUN SERPL-MCNC: 7 MG/DL (ref 7–17)
CALCIUM SERPL-MCNC: 8.7 MG/DL (ref 8.7–10.5)
CHLORIDE SERPL-SCNC: 108 MMOL/L (ref 95–110)
CO2 SERPL-SCNC: 30 MMOL/L (ref 23–29)
CREAT SERPL-MCNC: 0.9 MG/DL (ref 0.5–1.4)
ERYTHROCYTE [DISTWIDTH] IN BLOOD BY AUTOMATED COUNT: 15.9 % (ref 11.5–14.5)
GFR SERPLBLD CREATININE-BSD FMLA CKD-EPI: >60 ML/MIN/1.73/M2
GLUCOSE SERPL-MCNC: 103 MG/DL (ref 70–110)
HCT VFR BLD AUTO: 41.4 % (ref 37–48.5)
HGB BLD-MCNC: 13.2 GM/DL (ref 12–16)
IMM GRANULOCYTES # BLD AUTO: 0.01 K/UL (ref 0–0.04)
IMM GRANULOCYTES NFR BLD AUTO: 0.2 % (ref 0–0.5)
INR PPP: 1.1 (ref 0.8–1.2)
LYMPHOCYTES # BLD AUTO: 2.72 K/UL (ref 1–4.8)
MCH RBC QN AUTO: 27.6 PG (ref 27–31)
MCHC RBC AUTO-ENTMCNC: 31.9 G/DL (ref 32–36)
MCV RBC AUTO: 86 FL (ref 82–98)
NUCLEATED RBC (/100WBC) (OHS): 0 /100 WBC
PLATELET # BLD AUTO: 230 K/UL (ref 150–450)
PMV BLD AUTO: 9.8 FL (ref 9.2–12.9)
POTASSIUM SERPL-SCNC: 4.4 MMOL/L (ref 3.5–5.1)
PROTHROMBIN TIME: 11.6 SECONDS (ref 9–12.5)
RBC # BLD AUTO: 4.79 M/UL (ref 4–5.4)
RELATIVE EOSINOPHIL (OHS): 1.7 %
RELATIVE LYMPHOCYTE (OHS): 52.3 % (ref 18–48)
RELATIVE MONOCYTE (OHS): 10.4 % (ref 4–15)
RELATIVE NEUTROPHIL (OHS): 34.4 % (ref 38–73)
SODIUM SERPL-SCNC: 143 MMOL/L (ref 136–145)
WBC # BLD AUTO: 5.2 K/UL (ref 3.9–12.7)

## 2025-07-21 PROCEDURE — 85025 COMPLETE CBC W/AUTO DIFF WBC: CPT | Mod: PN

## 2025-07-21 PROCEDURE — 36415 COLL VENOUS BLD VENIPUNCTURE: CPT | Mod: PN

## 2025-07-21 PROCEDURE — 85730 THROMBOPLASTIN TIME PARTIAL: CPT | Mod: PN

## 2025-07-21 PROCEDURE — 82310 ASSAY OF CALCIUM: CPT | Mod: PN

## 2025-07-21 PROCEDURE — 85610 PROTHROMBIN TIME: CPT | Mod: PN

## 2025-07-29 ENCOUNTER — TELEPHONE (OUTPATIENT)
Dept: ELECTROPHYSIOLOGY | Facility: CLINIC | Age: 65
End: 2025-07-29
Payer: MEDICARE

## 2025-07-30 ENCOUNTER — TELEPHONE (OUTPATIENT)
Dept: ELECTROPHYSIOLOGY | Facility: CLINIC | Age: 65
End: 2025-07-30
Payer: MEDICARE

## 2025-07-30 NOTE — TELEPHONE ENCOUNTER
Spoke to patients' daughter (Addie)    CONFIRMED procedure arrival time of 8:00 Am on 7/31/2025    Reiterated instructions including:  -Directions to check in desk  -NPO after midnight night prior to procedure  -Patient allowed to drink water up until 2 hours prior to arrival due to IV fluid shortage.   -High importance of HOLDING Pradaxa, Exforge, and Metformin the morning of the procedure. Patients' daughter verbalized understanding.   -Confirmed that patient does/does not take GLP-1 Agonist.  -Confirmed compliance of Pradaxa. Patient to hold the morning of the procedure.   -Pre-procedure LABS Reviewed.   -Confirmed absence  of implanted device/stimulator   -Confirmed no fever, cough, or shortness of breath in the past 30 days  -Confirmed no redness, rash, irritation, or yeast infection to groin area.   -Do not wear mascara day of procedure    -Reviewed current visitor policy    Patient verbalized understanding of above and appreciated the call.

## 2025-07-31 ENCOUNTER — ANESTHESIA EVENT (OUTPATIENT)
Dept: MEDSURG UNIT | Facility: HOSPITAL | Age: 65
End: 2025-07-31
Payer: MEDICARE

## 2025-07-31 ENCOUNTER — HOSPITAL ENCOUNTER (OUTPATIENT)
Facility: HOSPITAL | Age: 65
Discharge: HOME | End: 2025-07-31
Attending: INTERNAL MEDICINE | Admitting: INTERNAL MEDICINE
Payer: MEDICARE

## 2025-07-31 ENCOUNTER — ANESTHESIA (OUTPATIENT)
Dept: MEDSURG UNIT | Facility: HOSPITAL | Age: 65
End: 2025-07-31
Payer: MEDICARE

## 2025-07-31 VITALS
OXYGEN SATURATION: 95 % | RESPIRATION RATE: 18 BRPM | HEART RATE: 75 BPM | WEIGHT: 237 LBS | HEIGHT: 64 IN | DIASTOLIC BLOOD PRESSURE: 91 MMHG | TEMPERATURE: 98 F | SYSTOLIC BLOOD PRESSURE: 170 MMHG | BODY MASS INDEX: 40.46 KG/M2

## 2025-07-31 DIAGNOSIS — I48.91 ATRIAL FIBRILLATION: ICD-10-CM

## 2025-07-31 DIAGNOSIS — I48.19 PERSISTENT ATRIAL FIBRILLATION: ICD-10-CM

## 2025-07-31 DIAGNOSIS — I49.9 ARRHYTHMIA: ICD-10-CM

## 2025-07-31 DIAGNOSIS — I48.91 A-FIB: ICD-10-CM

## 2025-07-31 LAB
OHS CV CPX PATIENT HEIGHT IN: 64
OHS QRS DURATION: 150 MS
OHS QRS DURATION: 152 MS
OHS QTC CALCULATION: 481 MS
OHS QTC CALCULATION: 542 MS
POCT GLUCOSE: 106 MG/DL (ref 70–110)
POCT GLUCOSE: 109 MG/DL (ref 70–110)

## 2025-07-31 PROCEDURE — 27201423 OPTIME MED/SURG SUP & DEVICES STERILE SUPPLY: Performed by: INTERNAL MEDICINE

## 2025-07-31 PROCEDURE — 93656 COMPRE EP EVAL ABLTJ ATR FIB: CPT | Performed by: INTERNAL MEDICINE

## 2025-07-31 PROCEDURE — 82962 GLUCOSE BLOOD TEST: CPT | Performed by: INTERNAL MEDICINE

## 2025-07-31 PROCEDURE — C1760 CLOSURE DEV, VASC: HCPCS | Performed by: INTERNAL MEDICINE

## 2025-07-31 PROCEDURE — 63600175 PHARM REV CODE 636 W HCPCS: Performed by: NURSE ANESTHETIST, CERTIFIED REGISTERED

## 2025-07-31 PROCEDURE — 63600175 PHARM REV CODE 636 W HCPCS: Performed by: INTERNAL MEDICINE

## 2025-07-31 PROCEDURE — 93005 ELECTROCARDIOGRAM TRACING: CPT

## 2025-07-31 PROCEDURE — 85347 COAGULATION TIME ACTIVATED: CPT | Mod: 91 | Performed by: INTERNAL MEDICINE

## 2025-07-31 PROCEDURE — 37000008 HC ANESTHESIA 1ST 15 MINUTES: Performed by: INTERNAL MEDICINE

## 2025-07-31 PROCEDURE — 25000003 PHARM REV CODE 250: Performed by: INTERNAL MEDICINE

## 2025-07-31 PROCEDURE — C1753 CATH, INTRAVAS ULTRASOUND: HCPCS | Performed by: INTERNAL MEDICINE

## 2025-07-31 PROCEDURE — 25000003 PHARM REV CODE 250: Performed by: NURSE ANESTHETIST, CERTIFIED REGISTERED

## 2025-07-31 PROCEDURE — C1730 CATH, EP, 19 OR FEW ELECT: HCPCS | Performed by: INTERNAL MEDICINE

## 2025-07-31 PROCEDURE — 93010 ELECTROCARDIOGRAM REPORT: CPT | Mod: ,,, | Performed by: STUDENT IN AN ORGANIZED HEALTH CARE EDUCATION/TRAINING PROGRAM

## 2025-07-31 PROCEDURE — 93010 ELECTROCARDIOGRAM REPORT: CPT | Mod: ,,, | Performed by: INTERNAL MEDICINE

## 2025-07-31 PROCEDURE — 99499 UNLISTED E&M SERVICE: CPT | Mod: ,,, | Performed by: ANESTHESIOLOGY

## 2025-07-31 PROCEDURE — C1766 INTRO/SHEATH,STRBLE,NON-PEEL: HCPCS | Performed by: INTERNAL MEDICINE

## 2025-07-31 PROCEDURE — C1887 CATHETER, GUIDING: HCPCS | Performed by: INTERNAL MEDICINE

## 2025-07-31 PROCEDURE — C1769 GUIDE WIRE: HCPCS | Performed by: INTERNAL MEDICINE

## 2025-07-31 PROCEDURE — C1733 CATH, EP, OTHR THAN COOL-TIP: HCPCS | Performed by: INTERNAL MEDICINE

## 2025-07-31 PROCEDURE — C1894 INTRO/SHEATH, NON-LASER: HCPCS | Performed by: INTERNAL MEDICINE

## 2025-07-31 PROCEDURE — 37000009 HC ANESTHESIA EA ADD 15 MINS: Performed by: INTERNAL MEDICINE

## 2025-07-31 PROCEDURE — 93656 COMPRE EP EVAL ABLTJ ATR FIB: CPT | Mod: ,,, | Performed by: INTERNAL MEDICINE

## 2025-07-31 DEVICE — THE VASCADE MVP XL VENOUS VASCULAR CLOSURE SYSTEM (VVCS) 10-12F IS INTENDED TO SEAL FEMORAL VEINS WITH SINGLE OR MULTIPLE ACCESS SITES IN ONE OR BOTH LIMBS AT THE COMPLETION OF CATHETERIZATION PROCEDURES. THE SYSTEM IS DESIGNED TO DELIVER A RESORBABLE COLLAGEN PATCH, EXTRA-VASCULARLY, AT VESSEL PUNCTURE SITES TO ACHIEVE HEMOSTASIS. FOR USE WITH 10FR TO 12FR (15F MAXIMUM OUTER DIAMETER) INTRODUCER SHEATHS; OVERALL LENGTH OF THE SHEATH (INCLUDING THE HUB) NEEDS TO BE LESS THAN 15CM.
Type: IMPLANTABLE DEVICE | Site: GROIN | Status: FUNCTIONAL
Brand: CARDIVA VASCADE MVP XL VVCS 10-12F

## 2025-07-31 RX ORDER — PROPOFOL 10 MG/ML
VIAL (ML) INTRAVENOUS
Status: DISCONTINUED | OUTPATIENT
Start: 2025-07-31 | End: 2025-07-31

## 2025-07-31 RX ORDER — VALSARTAN 80 MG/1
80 TABLET ORAL ONCE
Status: COMPLETED | OUTPATIENT
Start: 2025-07-31 | End: 2025-07-31

## 2025-07-31 RX ORDER — LIDOCAINE HYDROCHLORIDE 20 MG/ML
INJECTION, SOLUTION EPIDURAL; INFILTRATION; INTRACAUDAL; PERINEURAL
Status: DISCONTINUED | OUTPATIENT
Start: 2025-07-31 | End: 2025-07-31

## 2025-07-31 RX ORDER — LIDOCAINE HYDROCHLORIDE 20 MG/ML
INJECTION, SOLUTION INFILTRATION; PERINEURAL
Status: DISCONTINUED | OUTPATIENT
Start: 2025-07-31 | End: 2025-07-31 | Stop reason: HOSPADM

## 2025-07-31 RX ORDER — ACETAMINOPHEN 325 MG/1
650 TABLET ORAL EVERY 4 HOURS PRN
Status: DISCONTINUED | OUTPATIENT
Start: 2025-07-31 | End: 2025-07-31 | Stop reason: HOSPADM

## 2025-07-31 RX ORDER — ROCURONIUM BROMIDE 10 MG/ML
INJECTION, SOLUTION INTRAVENOUS
Status: DISCONTINUED | OUTPATIENT
Start: 2025-07-31 | End: 2025-07-31

## 2025-07-31 RX ORDER — VALSARTAN 80 MG/1
80 TABLET ORAL DAILY
Status: DISCONTINUED | OUTPATIENT
Start: 2025-08-01 | End: 2025-07-31

## 2025-07-31 RX ORDER — VALSARTAN 80 MG/1
80 TABLET ORAL 2 TIMES DAILY
Status: DISCONTINUED | OUTPATIENT
Start: 2025-07-31 | End: 2025-07-31

## 2025-07-31 RX ORDER — GLUCAGON 1 MG
1 KIT INJECTION
Status: DISCONTINUED | OUTPATIENT
Start: 2025-07-31 | End: 2025-07-31 | Stop reason: HOSPADM

## 2025-07-31 RX ORDER — FENTANYL CITRATE 50 UG/ML
25 INJECTION, SOLUTION INTRAMUSCULAR; INTRAVENOUS EVERY 5 MIN PRN
Status: DISCONTINUED | OUTPATIENT
Start: 2025-07-31 | End: 2025-07-31 | Stop reason: HOSPADM

## 2025-07-31 RX ORDER — PHENYLEPHRINE HYDROCHLORIDE 10 MG/ML
INJECTION INTRAVENOUS
Status: DISCONTINUED | OUTPATIENT
Start: 2025-07-31 | End: 2025-07-31

## 2025-07-31 RX ORDER — HEPARIN SOD,PORCINE/0.9 % NACL 1000/500ML
INTRAVENOUS SOLUTION INTRAVENOUS
Status: DISCONTINUED | OUTPATIENT
Start: 2025-07-31 | End: 2025-07-31 | Stop reason: HOSPADM

## 2025-07-31 RX ORDER — ATROPINE SULFATE 0.1 MG/ML
INJECTION INTRAVENOUS
Status: DISCONTINUED | OUTPATIENT
Start: 2025-07-31 | End: 2025-07-31

## 2025-07-31 RX ORDER — SODIUM CHLORIDE 0.9 % (FLUSH) 0.9 %
10 SYRINGE (ML) INJECTION
Status: DISCONTINUED | OUTPATIENT
Start: 2025-07-31 | End: 2025-07-31 | Stop reason: HOSPADM

## 2025-07-31 RX ORDER — FENTANYL CITRATE 50 UG/ML
INJECTION, SOLUTION INTRAMUSCULAR; INTRAVENOUS
Status: DISCONTINUED | OUTPATIENT
Start: 2025-07-31 | End: 2025-07-31

## 2025-07-31 RX ORDER — AMLODIPINE BESYLATE 5 MG/1
5 TABLET ORAL DAILY
Status: DISCONTINUED | OUTPATIENT
Start: 2025-08-01 | End: 2025-07-31

## 2025-07-31 RX ORDER — ONDANSETRON HYDROCHLORIDE 2 MG/ML
INJECTION, SOLUTION INTRAVENOUS
Status: DISCONTINUED | OUTPATIENT
Start: 2025-07-31 | End: 2025-07-31

## 2025-07-31 RX ORDER — AMLODIPINE BESYLATE 5 MG/1
5 TABLET ORAL ONCE
Status: COMPLETED | OUTPATIENT
Start: 2025-07-31 | End: 2025-07-31

## 2025-07-31 RX ORDER — HALOPERIDOL LACTATE 5 MG/ML
0.5 INJECTION, SOLUTION INTRAMUSCULAR EVERY 10 MIN PRN
Status: DISCONTINUED | OUTPATIENT
Start: 2025-07-31 | End: 2025-07-31 | Stop reason: HOSPADM

## 2025-07-31 RX ORDER — PROTAMINE SULFATE 10 MG/ML
INJECTION, SOLUTION INTRAVENOUS
Status: DISCONTINUED | OUTPATIENT
Start: 2025-07-31 | End: 2025-07-31

## 2025-07-31 RX ORDER — HEPARIN SODIUM 1000 [USP'U]/ML
INJECTION, SOLUTION INTRAVENOUS; SUBCUTANEOUS
Status: DISCONTINUED | OUTPATIENT
Start: 2025-07-31 | End: 2025-07-31

## 2025-07-31 RX ADMIN — FENTANYL CITRATE 100 MCG: 50 INJECTION, SOLUTION INTRAMUSCULAR; INTRAVENOUS at 10:07

## 2025-07-31 RX ADMIN — PROTAMINE SULFATE 10 MG: 10 INJECTION, SOLUTION INTRAVENOUS at 12:07

## 2025-07-31 RX ADMIN — PHENYLEPHRINE HYDROCHLORIDE 200 MCG: 10 INJECTION INTRAVENOUS at 10:07

## 2025-07-31 RX ADMIN — SODIUM CHLORIDE: 0.9 INJECTION, SOLUTION INTRAVENOUS at 10:07

## 2025-07-31 RX ADMIN — PHENYLEPHRINE HYDROCHLORIDE 100 MCG: 10 INJECTION INTRAVENOUS at 10:07

## 2025-07-31 RX ADMIN — PHENYLEPHRINE HYDROCHLORIDE 0.2 MCG/KG/MIN: 10 INJECTION INTRAVENOUS at 10:07

## 2025-07-31 RX ADMIN — AMLODIPINE BESYLATE 5 MG: 5 TABLET ORAL at 04:07

## 2025-07-31 RX ADMIN — HEPARIN SODIUM 3000 UNITS: 1000 INJECTION, SOLUTION INTRAVENOUS; SUBCUTANEOUS at 12:07

## 2025-07-31 RX ADMIN — ROCURONIUM BROMIDE 20 MG: 10 INJECTION INTRAVENOUS at 12:07

## 2025-07-31 RX ADMIN — VALSARTAN 80 MG: 80 TABLET, FILM COATED ORAL at 04:07

## 2025-07-31 RX ADMIN — HEPARIN SODIUM 12000 UNITS: 1000 INJECTION, SOLUTION INTRAVENOUS; SUBCUTANEOUS at 11:07

## 2025-07-31 RX ADMIN — PHENYLEPHRINE HYDROCHLORIDE 200 MCG: 10 INJECTION INTRAVENOUS at 12:07

## 2025-07-31 RX ADMIN — HEPARIN SODIUM 12 UNITS/KG/HR: 1000 INJECTION, SOLUTION INTRAVENOUS; SUBCUTANEOUS at 11:07

## 2025-07-31 RX ADMIN — HEPARIN SODIUM 6000 UNITS: 1000 INJECTION, SOLUTION INTRAVENOUS; SUBCUTANEOUS at 11:07

## 2025-07-31 RX ADMIN — SODIUM CHLORIDE: 0.9 INJECTION, SOLUTION INTRAVENOUS at 11:07

## 2025-07-31 RX ADMIN — SUGAMMADEX 400 MG: 100 INJECTION, SOLUTION INTRAVENOUS at 12:07

## 2025-07-31 RX ADMIN — ATROPINE SULFATE 0.5 MG: 0.1 INJECTION INTRAVENOUS at 11:07

## 2025-07-31 RX ADMIN — LIDOCAINE HYDROCHLORIDE 50 MG: 20 INJECTION, SOLUTION EPIDURAL; INFILTRATION; INTRACAUDAL; PERINEURAL at 10:07

## 2025-07-31 RX ADMIN — ONDANSETRON 4 MG: 2 INJECTION INTRAMUSCULAR; INTRAVENOUS at 12:07

## 2025-07-31 RX ADMIN — ROCURONIUM BROMIDE 50 MG: 10 INJECTION INTRAVENOUS at 11:07

## 2025-07-31 RX ADMIN — PROPOFOL 150 MG: 10 INJECTION, EMULSION INTRAVENOUS at 10:07

## 2025-07-31 RX ADMIN — ROCURONIUM BROMIDE 50 MG: 10 INJECTION INTRAVENOUS at 10:07

## 2025-07-31 NOTE — DISCHARGE SUMMARY
[facility] - Short Stay Cardiac Unit  Cardiac Electrophysiology  Discharge Summary      Patient Name: Yeny Hargrove  MRN: [id number]  Admission Date: 7/31/2025  Hospital Length of Stay: 0 days  Discharge Date and Time: 07/31/2025 4:01 PM  Attending Physician: Jermaine Vera MD    Discharging Provider: Jas Ugarte MD  Primary Care Physician: Royal Iniguez DO    HPI:   65 year-old-woman with hypertension, type 2 DM, breast cancer, obesity and paroxysmal atrial fibrillation. First presented to the hospital with palpitations in 3/2022. Underwent DCCV with Dr. Benavidez. Was started on xarelto and stopped it due to cost for a short period of time. Had another paroxysm in December of 2022. She was referred to discuss her AF in March of 2025. She resumed xarelto. She reported no AF symptoms since 12/2022. She was concerned with cost of xarelto. Offered to switch to pradaxa. [place] [facility] [place].    Procedure(s) (LRB):  Ablation atrial fibrillation (N/A)       Hospital Course:  Patient underwent PFA-PVI (see procedure note for details), tolerated procedure well with no acute complications. Admitted to PACU. Bilateral groin sites with sutures removed, dressings C/D/I. No bleeding, hematoma, or bruit noted. Bedrest completed x 4 hours. Patient was monitored on telemetry, no acute arrhythmias.   Patient ambulating, tolerating PO intake, and voiding with no issues. Denies any chest pain or SOB. Discharge plans discussed with Dr. Vera. Patient to continue all home except flecainide due to LBBB  and Pradaxa for CVA prophylaxis. Follow up with Dr. Vera in 3 months. Patient was assessed at bedside prior to discharge. Patient reported feeling well and denied chest discomfort, shortness of breath, palpitations, lightheadedness, or any other acute symptoms. Discharge plans/instructions discussed with patient and patiejnt who verbalized understanding and agreement of plans of care. No further  questions or concerns voiced at this time. Patient was discharged home in stable condition.     Physical Exam:   Gen: No acute distress, pleasant patient answering questions appropriately  CVS: Regular rate and rhythm, S1S2 normal, no murmurs/rubs/gallops  CHEST: Clear to auscultation bilaterally, no wheezes/rales/ronchi  ABD: Soft, non-tender, nondistended, bowel sounds present  GROINS:Bilateral groin sites soft, non tender, no bleeding, no swelling, no hematoma   Neurologic: Normal strength and tone. No focal numbness or weakness.   Psychiatric: Normal mood and affect. Behavior is normal. Alert and oriented X 3.  EXT: No Edema      Goals of Care Treatment Preferences:  Code Status: Full Code    Significant Diagnostic Studies: N/A    Pending Diagnostic Studies:       None            There are no hospital problems to display for this patient.    No new Assessment & Plan notes have been filed under this hospital service since the last note was generated.  Service: Arrhythmia      Discharged Condition: good    Disposition:     Follow Up:   Follow-up Information       Jermaine Vera MD Follow up in 3 day(s).    Specialties: Electrophysiology, Cardiology  Contact information:  [address] [place] [address] [id number] [phone]                           Patient Instructions:   No discharge procedures on file.  Medications:  Reconciled Home Medications:      Medication List        CONTINUE taking these medications      albuterol 90 mcg/actuation inhaler  Commonly known as: PROVENTIL/VENTOLIN HFA  Inhale 2 puffs into the lungs every 6 (six) hours as needed for Wheezing or Shortness of Breath. Rescue     albuterol-ipratropium 2.5 mg-0.5 mg/3 mL nebulizer solution  Commonly known as: DUO-NEB  Take 3 mLs by nebulization every 6 (six) hours as needed for Wheezing. Rescue     amitriptyline 150 MG Tab  Commonly known as: ELAVIL  Take 150 mg by mouth every evening.     amlodipine-valsartan  mg per tablet  Commonly  "known as: EXFORGE  Take 1 tablet by mouth once daily.     anastrozole 1 mg Tab  Commonly known as: ARIMIDEX  Take 1 tablet (1 mg total) by mouth once daily.     ARNUITY ELLIPTA 100 mcg/actuation inhaler  Generic drug: fluticasone furoate  Inhale 1 puff into the lungs once daily. Controller     atorvastatin 80 MG tablet  Commonly known as: LIPITOR  Take 1 tablet by mouth once daily     azelastine 137 mcg (0.1 %) nasal spray  Commonly known as: ASTELIN  1 spray (137 mcg total) by Nasal route 2 (two) times daily.     busPIRone 5 MG Tab  Commonly known as: BUSPAR  Take 1 tablet (5 mg total) by mouth 2 (two) times daily.     cetirizine 10 MG tablet  Commonly known as: ZYRTEC  Take 1 tablet (10 mg total) by mouth once daily.     dabigatran etexilate 150 mg Cap  Commonly known as: PRADAXA  Take 1 capsule (150 mg total) by mouth 2 (two) times a day. "Do NOT break, chew, or open capsules."     diclofenac sodium 1 % Gel  Commonly known as: VOLTAREN ARTHRITIS PAIN  Apply 2 g topically 2 (two) times daily.     fluticasone propionate 50 mcg/actuation nasal spray  Commonly known as: FLONASE  fluticasone propionate 50 mcg/actuation nasal spray,suspension     ipratropium 21 mcg (0.03 %) nasal spray  Commonly known as: ATROVENT  2 sprays by Each Nostril route 2 (two) times daily.     metFORMIN 500 MG ER 24hr tablet  Commonly known as: GLUCOPHAGE-XR  Take 2 tablets (1,000 mg total) by mouth 2 (two) times daily with meals.     metoprolol succinate 50 MG 24 hr tablet  Commonly known as: TOPROL-XL  Take 1 tablet (50 mg total) by mouth once daily.     pregabalin 150 MG capsule  Commonly known as: LYRICA  Take 1 capsule (150 mg total) by mouth 2 (two) times daily.            ASK your doctor about these medications      flecainide 100 MG Tab  Commonly known as: TAMBOCOR  Take 1 tablet (100 mg total) by mouth every 12 (twelve) hours.              Jas Ugarte MD  Cardiac Electrophysiology  [facility] - Short Stay Cardiac Unit  "

## 2025-07-31 NOTE — DISCHARGE INSTRUCTIONS
Discharge Instructions and Care of Your Groin      Catheter Insertion Site  The morning after your procedure, you may take the dressing off. The easiest way to do this is when you are showering, get the tape and dressing wet and remove it.  After the bandage is removed, cover the area with a small adhesive bandage. It is normal for the catheter insertion site to be black and blue for a couple of days. The site may also be slightly swollen and pink, and there may be a small lump (about the size of a quarter) at the site.  Wash the catheter insertion site at least once daily with soap and water. Place soapy water on your hand or washcloth and gently wash the insertion site; do not rub.  Keep the area clean and dry when you are not showering.  Do not use creams, lotions or ointment on the wound site.  Wear loose clothes and loose underwear.  Do not take a bath, tub soak, go in a Jacuzzi, or swim in a pool or lake for one week after the procedure.    Activity Instructions  Do not strain during bowel movements for the first 3 to 4 days after the procedure to prevent bleeding from the catheter insertion site.  Avoid heavy lifting (more than 10 pounds) and pushing or pulling heavy objects for the first 5 to 7 days after the procedure.  Do not participate in strenuous activities for 5 days after the procedure. This includes most sports - jogging, golfing, play tennis, and bowling.  You may climb stairs if needed, but walk up and down the stairs more slowly than usual.  Gradually increase your activities until you reach your normal activity level within one week after the procedure.      If bleeding should occur following discharge:  Sit down and apply firm pressure to the puncture site with your fingers for 10 minutes   If the bleeding stops, continue to sit quietly, keeping your leg straight for 2 hours. Notify your physician as soon as possible   If bleeding does not stop after 10 minutes or if there is a large amount of  bleeding or spurting, call 911 immediately.  Do not drive yourself to the hospital.     Notify your physician if these symptoms persist or if you experience:  Change in color or temperature of the leg  Redness, heat, or pus at the puncture site  Chills or fever greater than 101.0 F       Because of the sedation you were given for your procedure, we recommend that you have someone stay with you overnight following your procedure.  Do not drive a car or operate machinery for the remainder of the day.  Do not consume any alcoholic beverages for the remainder of the day.  Postpone signing any important papers or making any important decision for the remainder of the day.  Do not take any muscle relaxants, sedatives, hypnotics, or mood-altering medication today unless ordered by your physician who is aware you are taking the medication today.

## 2025-07-31 NOTE — H&P
[facility] - Short Stay Cardiac Unit  Cardiac Electrophysiology  History and Physical     Admission Date: 7/31/2025  Code Status: Prior   Attending Provider: Jermaine Vera MD   Principal Problem:<principal problem not specified>    Subjective:     HPI: 65 year-old-woman with hypertension, type 2 DM, breast cancer, obesity and paroxysmal atrial fibrillation. First presented to the hospital with palpitations in 3/2022. Underwent DCCV with Dr. Benavidez. Was started on xarelto and stopped it due to cost for a short period of time. Had another paroxysm in December of 2022. She was referred to discuss her AF in March of 2025. She resumed xarelto. She reported no AF symptoms since 12/2022. She was concerned with cost of xarelto. Offered to switch to pradaxa. [place] [facility] [place].    Anticoagulant/antiplatelets: pradaxa  ECG: NSR  Platelet count: 230  INR: 1.1      Past Medical History:   Diagnosis Date    Asthma     Breast cancer     Coronary artery disease     Diabetes mellitus     Estrogen receptor positive 06/05/2023    Hypertension        Past Surgical History:   Procedure Laterality Date    BREAST LUMPECTOMY      ECHOCARDIOGRAM,TRANSESOPHAGEAL N/A 5/21/2025    Procedure: Transesophageal echo (TAYLA) intra-procedure log documentation;  Surgeon: Clark Ballard MD;  Location: [facility] EP LAB;  Service: Cardiology;  Laterality: N/A;    HYSTERECTOMY      MASTECTOMY      TREATMENT OF CARDIAC ARRHYTHMIA N/A 3/16/2022    Procedure: Cardioversion or Defibrillation;  Surgeon: Shanika Benavidez MD;  Location: [facility] CATH LAB/EP;  Service: Cardiology;  Laterality: N/A;    TREATMENT OF CARDIAC ARRHYTHMIA N/A 5/21/2025    Procedure: Cardioversion or Defibrillation;  Surgeon: Jermaine Vera MD;  Location: [facility] EP LAB;  Service: Cardiology;  Laterality: N/A;  AF, TAYLA, DCCV, MAC, [place], 3 Prep       Review of patient's allergies indicates:   Allergen Reactions    Iodine and iodide containing products Swelling     "Shellfish containing products Swelling    Shellfish derived Swelling    Iodinated contrast media Swelling    Pcn [penicillins] Swelling       No current facility-administered medications on file prior to encounter.     Current Outpatient Medications on File Prior to Encounter   Medication Sig    albuterol (PROVENTIL/VENTOLIN HFA) 90 mcg/actuation inhaler Inhale 2 puffs into the lungs every 6 (six) hours as needed for Wheezing or Shortness of Breath. Rescue    albuterol-ipratropium (DUO-NEB) 2.5 mg-0.5 mg/3 mL nebulizer solution Take 3 mLs by nebulization every 6 (six) hours as needed for Wheezing. Rescue    amitriptyline (ELAVIL) 150 MG Tab Take 150 mg by mouth every evening.    amlodipine-valsartan (EXFORGE)  mg per tablet Take 1 tablet by mouth once daily.    anastrozole (ARIMIDEX) 1 mg Tab Take 1 tablet (1 mg total) by mouth once daily.    azelastine (ASTELIN) 137 mcg (0.1 %) nasal spray 1 spray (137 mcg total) by Nasal route 2 (two) times daily.    cetirizine (ZYRTEC) 10 MG tablet Take 1 tablet (10 mg total) by mouth once daily.    dabigatran etexilate (PRADAXA) 150 mg Cap Take 1 capsule (150 mg total) by mouth 2 (two) times a day. "Do NOT break, chew, or open capsules."    diclofenac sodium (VOLTAREN ARTHRITIS PAIN) 1 % Gel Apply 2 g topically 2 (two) times daily.    flecainide (TAMBOCOR) 100 MG Tab Take 1 tablet (100 mg total) by mouth every 12 (twelve) hours.    fluticasone furoate (ARNUITY ELLIPTA) 100 mcg/actuation inhaler Inhale 1 puff into the lungs once daily. Controller    fluticasone propionate (FLONASE) 50 mcg/actuation nasal spray fluticasone propionate 50 mcg/actuation nasal spray,suspension    ipratropium (ATROVENT) 21 mcg (0.03 %) nasal spray 2 sprays by Each Nostril route 2 (two) times daily.    metoprolol succinate (TOPROL-XL) 50 MG 24 hr tablet Take 1 tablet (50 mg total) by mouth once daily.     Family History       Problem Relation (Age of Onset)    Breast cancer Other (70)    " Hypertension Mother          Tobacco Use    Smoking status: Never     Passive exposure: Past    Smokeless tobacco: Never   Vaping Use    Vaping status: Never Used   Substance and Sexual Activity    Alcohol use: No    Drug use: No    Sexual activity: Never     Birth control/protection: None     ROS  Objective:     Vital Signs (Most Recent):    Vital Signs (24h Range):             There is no height or weight on file to calculate BMI.            Physical Exam  Constitutional:       Appearance: Normal appearance.   HENT:      Head: Normocephalic.      Mouth/Throat:      Mouth: Mucous membranes are moist.   Eyes:      Pupils: Pupils are equal, round, and reactive to light.   Cardiovascular:      Rate and Rhythm: Normal rate and regular rhythm.      Pulses: Normal pulses.      Heart sounds: No murmur heard.     No friction rub. No gallop.   Pulmonary:      Effort: Pulmonary effort is normal.   Musculoskeletal:         General: Normal range of motion.      Cervical back: Neck supple.   Neurological:      Mental Status: She is alert.         Significant Labs:   Recent Lab Results       None            Assessment and Plan:     #Paroxysmal atrial fibrillation   65 year-old-woman with relevant history of HTN, DM2, breast cancer, obesity and PAF, now persistent. Who presents to [facility] for PVI PFA [place] for mapping.    Plan:     -PVI with PFA  -[facility] for mapping  -Anesthesia          Jas Ugarte MD  Cardiac Electrophysiology  [facility] - Short Stay Cardiac Unit

## 2025-07-31 NOTE — PLAN OF CARE
Bilateral groin sites CDI. /91. Dr. Ugarte notified and states patient is okay for discharge. Patient able to eat, ambulate and void independently post procedure. Discharge instructions and prescriptions reviewed with patient. Patient verbalizes understanding. VSS. IV removed. NADN. Patient wheeled out via staff.

## 2025-07-31 NOTE — Clinical Note
Name: Thomas Horne      : 1943      MRN: [id number]  Encounter Provider: Nitza Sotelo DO  Encounter Date: 2025   Encounter department: [facility] WOUND CARE  :  Assessment & Plan  Diabetic ulcer of toe of right foot associated with type 2 diabetes mellitus, with necrosis of bone (HCC)  Wound is slightly improved  Debrided as below  Continue wound management with Acticoat, see wound orders below  Bone culture is pending.  Will initiate a course of antibiotics until the culture has been completed, will adjust antibiotic therapy as needed once the culture has been reviewed.  Bone sample confirms the presence of osteo  Again discussed the importance of tight diabetic control and proper offloading.  Discussed the high risk of complication including amputation  I recommend patient initiate HBO therapy, plan to start next week.  Patient healed the DFU of the left heel with hyperbaric oxygen therapy.  Use of HBO therapy for this great toe is giving him the best chance of salvaging the tissue.  Patient is at high risk for further complication including amputation  Patient's vascular status has been maximized in the right lower extremity  Follow-up in 1 week or call sooner with questions or concerns      Lab Results   Component Value Date    HGBA1C 6.2 (H) 10/14/2024       Orders:  •  lidocaine (LMX) 4 % cream  •  Wound cleansing and dressings Diabetic Ulcer Right;Posterior Toe D1, great; Future  •  Wound Procedure Treatment Diabetic Ulcer Right;Posterior Toe D1, great  •  cephalexin (KEFLEX) 500 mg capsule; Take 1 capsule (500 mg total) by mouth every 6 (six) hours for 10 days  •  Debridement Diabetic Ulcer Right;Posterior Toe D1, great        History of Present Illness   Chief Complaint   Patient presents with   • Follow Up Wound Care Visit     Right great toe   Patient presents for follow-up of a Sumner 4 DFU of the right great toe.  No increased pain or drainage.  Has been using  The closure device was inserted into the right femoral vein. acticoat on the wound and wearing a surgical shoe, walking on it minimally.  He reports that his blood sugar was in the 90s this morning and has been in this range recently.  Patient denies any chest pains or shortness of breath.  He reports some issues with nausea over the past couple of days but checked his sugar and he was not hypoglycemic.  He states that the nausea resolves with some Zofran.  He also reports that his daughter who lives with him has been sick with a stomach bug.      Here for wound follow up.    Objective   /70   Pulse 65   Temp (!) 96.4 °F (35.8 °C)   Resp 18     Physical Exam  Constitutional:       General: He is not in acute distress.     Appearance: Normal appearance. He is obese. He is not ill-appearing, toxic-appearing or diaphoretic.   HENT:      Head: Normocephalic and atraumatic.      Right Ear: External ear normal.      Left Ear: External ear normal.   Eyes:      Conjunctiva/sclera: Conjunctivae normal.   Pulmonary:      Effort: Pulmonary effort is normal. No respiratory distress.   Musculoskeletal:      Cervical back: Neck supple.   Skin:     Comments: See wound assessment   Neurological:      Mental Status: He is alert.   Psychiatric:         Mood and Affect: Mood normal.         Behavior: Behavior normal.       Wound 11/08/24 Diabetic Ulcer Toe D1, great Right;Posterior (Active)   Enter Sumner score: Sumner Grade 4: Partial-foot gangrene 01/27/25 0920   Wound Image   01/27/25 0957   Wound Description Yellow;White;Slough;Pink 01/27/25 0920   Clara-wound Assessment Edema;Dry;Callus 01/27/25 0920   Wound Length (cm) 1.4 cm 01/27/25 0920   Wound Width (cm) 1.6 cm 01/27/25 0920   Wound Depth (cm) 0.1 cm 01/27/25 0920   Wound Surface Area (cm^2) 2.24 cm^2 01/27/25 0920   Wound Volume (cm^3) 0.224 cm^3 01/27/25 0920   Calculated Wound Volume (cm^3) 0.22 cm^3 01/27/25 0920   Change in Wound Size % -266.67 01/27/25 0920   Drainage Amount Moderate 01/27/25 0920   Drainage Description  "Yellow;Serosanguineous 01/27/25 0920   Non-staged Wound Description Full thickness 01/27/25 0920   Dressing Status Intact 01/27/25 0920       Wound 12/16/24 Incision Chest Left;Upper (Active)       Wound 01/07/25 Catheter entry/exit site Groin Right (Active)       Debridement   Wound 11/08/24 Diabetic Ulcer Toe D1, great Right;Posterior    Universal Protocol:  procedure performed by consultantConsent: Verbal consent obtained.  Consent given by: patient  Time out: Immediately prior to procedure a \"time out\" was called to verify the correct patient, procedure, equipment, support staff and site/side marked as required.  Timeout called at: 1/27/2025 9:30 AM.  Patient understanding: patient states understanding of the procedure being performed  Patient identity confirmed: verbally with patient    Debridement Details  Performed by: physician  Debridement type: surgical  Level of debridement: subcutaneous tissue  Pain control: lidocaine 4%      Post-debridement measurements  Length (cm): 1.4  Width (cm): 1.6  Depth (cm): 0.2  Percent debrided: 100%  Surface Area (cm^2): 2.24  Area Debrided (cm^2): 2.24  Volume (cm^3): 0.45    Tissue and other material debrided: subcutaneous tissue  Devitalized tissue debrided: exudate and slough  Instrument(s) utilized: curette  Bleeding: small  Hemostasis obtained with: pressure  Response to treatment: procedure was tolerated well       Results from last 6 Months   Lab Units 01/03/25  1513   WOUND CULTURE  1+ Growth of Staphylococcus aureus*  1+ Growth of       HBO Qualification & Assessment     Thomas Horne presents today for a consultation for HBO treatment.    HBO Indication    Diabetic Lower Extremity Ulcer    Diabetes mellitus is documented as the primary or secondary diagnosis yes    Lower extremity ulcer location right great toe    Sumner grade 4    Sumner grade sustaniated by Visible necrotic tissue     Debridement has been performed and is documented Yes    Received standard " wound care for 30 days prior to HBO Yes    Blood glucose optimized for this patient Yes    Assessment of patient vascular status assessed by FABIOLA 0.6 or above    Offloading has been addressed/ordered Yes    Nutritional status optimized Yes     Infection addresses and treated Yes    Use of appropriate moist dressings to maintain clean wound Yes    Despite appropriate wound care, diabetic foot ulcer did not show healing in 4 consecutive weeks. Based on the information included Thomas Horne has a medical necessity for HBO and meets the conditions for adjunctive treatment to wound care.  Yes    Brief history of co-morbidities that may affect HBO treatment    Previously treated with No chemotherapy or medications which are contraindications to HBO    Patient reports s/s of No acute infections    Eyes    Patient has Myopia and Cataracts    Ears    Patient has a history of No ear abnormalities or conditions    Ears assessed for tympanic membrane or middle are abnormalities  No, tubes in place    Patient instructed on how to clear ears and demonstrate proper technique Yes      ENT consult for Myringotomy tube insertion due to No consult is needed      Neurological    Patient abreu a history of seizures No    Cardiovascular    Patient has a history of Cardiac stents and Pacemaker    EKG ordered No    Echocardiogram ordered No        Smoking  Social History     Tobacco Use   Smoking Status Former   • Current packs/day: 0.00   • Average packs/day: 1.5 packs/day for 35.0 years (52.5 ttl pk-yrs)   • Types: Cigarettes   • Start date: 1956   • Quit date:    • Years since quittin.1   • Passive exposure: Past   Smokeless Tobacco Never       Respiratory    Patient has a history of pneumothorax No    Patient has a history of No respiratory conditions requiring further work-up prior to HBO      Chest x-ray ordered No    Psychiatric    Patient has confinement anxiety No    Patient education and consent    I discussed with and  educated the patient on the risks and benefits of HBO, different treatment options, potential adverse side effects and side effects, HBO procedure, smoking/drug/alcohol policy, and itesm not allowed in the hyperbaric chamber.  Yes      A trained emergency response team and ICU is available in this facility to assist with complications if required. Yes    HBO treatment goal    Complete wound healing, Elimination of bone infection, Osteogenesis, and Angiogenesis

## 2025-07-31 NOTE — PLAN OF CARE
Upon entering room patient turned onto right side. Reinterated the importance of patient continuing to lie flat until instructed otherwise. R groin site CDI. Bleeding noted to L groin. Gauze/Tegaderm dressing removed from L side to assess site. No active hematoma, no active bleeding noted. Site soft to touch. Site redressed with gauze and Tegaderm. Patient HOB elevated to 20 degrees. Will reassess. Dr. Ugarte notified.       Patient's BP 180s post procedure. Dr. Ugarte notified. 5 mg amlodipine and 80 mg valsartan given PO.

## 2025-07-31 NOTE — HPI
65 year-old-woman with hypertension, type 2 DM, breast cancer, obesity and paroxysmal atrial fibrillation. First presented to the hospital with palpitations in 3/2022. Underwent DCCV with Dr. Benavidez. Was started on xarelto and stopped it due to cost for a short period of time. Had another paroxysm in December of 2022. She was referred to discuss her AF in March of 2025. She resumed xarelto. She reported no AF symptoms since 12/2022. She was concerned with cost of xarelto. Offered to switch to pradaxa. Today she is presenting to [facility] for PVI with PFA.

## 2025-07-31 NOTE — PLAN OF CARE
Sutures and stopcock removed from bilateral groins and sites dressed with gauze and Tegaderm. No active bleeding/no hematoma noted. HOB elevated 30 degrees. Patient tolerated well.

## 2025-07-31 NOTE — ANESTHESIA PROCEDURE NOTES
Peripheral IV Insertion    Diagnosis: I87.9 Disorder of vein, unspecified.    Patient location during procedure: OR    Staffing  Authorizing Provider: Tim Madrigal MD  Performing Provider: Tim Madrigal MD    Staffing  Performed by: Tim Madrigal MD  Authorized by: Tim Madrigal MD    Anesthesiologist was present at the time of the procedure.    Preanesthetic Checklist  Completed: patient identified, IV checked, site marked, risks and benefits discussed, surgical consent, monitors and equipment checked, pre-op evaluation, timeout performed and anesthesia consent givenPeripheral IV Insertion  Skin Prep: chlorhexidine gluconate  Local Infiltration: none  Orientation: right  Location: antecubital  Catheter Type: peripheral IV (single lumen)  Catheter Size: 18 G  Catheter placement by Ultrasound guidance. Heme positive aspiration all ports.   Vessel Caliber: patent  Needle advanced into vessel with real time Ultrasound guidance.Insertion Attempts: 1  Assessment  Dressing: secured with tape and tegaderm  Patient: Tolerated well  Line flushed easily.

## 2025-07-31 NOTE — PLAN OF CARE
Received report from SHADY Otero. Patient s/p ablation, AAOx3. VSS, no c/o pain or discomfort at this time, resp even and unlabored. Sutures and stopcock in place to bilateral groins. No active bleeding. No hematoma noted. Post procedure protocol reviewed with patient and patient's family. Understanding verbalized. Family members at bedside. Nurse call bell within reach.

## 2025-07-31 NOTE — ANESTHESIA PROCEDURE NOTES
Intubation    Date/Time: 7/31/2025 10:33 AM    Performed by: Saurabh Flores CRNA  Authorized by: Tim Madrigal MD    Intubation:     Induction:  Intravenous    Intubated:  Postinduction    Mask Ventilation:  Easy mask    Attempts:  1    Attempted By:  Student    Method of Intubation:  Direct    Blade:  Eliezer 3    Laryngeal View Grade: Grade I - full view of cords      Difficult Airway Encountered?: No      Complications:  None    Airway Device:  Oral endotracheal tube    Airway Device Size:  7.0    Style/Cuff Inflation:  Cuffed (inflated to minimal occlusive pressure)    Tube secured:  22    Secured at:  The gums    Placement Verified By:  Capnometry    Complicating Factors:  None    Findings Post-Intubation:  BS equal bilateral and atraumatic/condition of teeth unchanged

## 2025-07-31 NOTE — ANESTHESIA POSTPROCEDURE EVALUATION
Anesthesia Post Evaluation    Patient: Yeny Hargrove    Procedure(s) Performed: Procedure(s) (LRB):  Ablation atrial fibrillation (N/A)    Final Anesthesia Type: general      Patient location during evaluation: PACU  Patient participation: Yes- Able to Participate  Level of consciousness: awake and alert  Post-procedure vital signs: reviewed and stable  Pain management: adequate  Airway patency: patent    PONV status at discharge: No PONV  Anesthetic complications: no      Cardiovascular status: hemodynamically stable  Respiratory status: spontaneous ventilation  Follow-up not needed.              Vitals Value Taken Time   /73 07/31/25 13:31   Temp 36.5 °C (97.7 °F) 07/31/25 13:00   Pulse 76 07/31/25 13:39   Resp 17 07/31/25 13:39   SpO2 95 % 07/31/25 13:39   Vitals shown include unfiled device data.      No case tracking events are documented in the log.      Pain/Salina Score: Salina Score: 8 (7/31/2025  1:15 PM)

## 2025-07-31 NOTE — HOSPITAL COURSE
Patient underwent PFA-PVI (see procedure note for details), tolerated procedure well with no acute complications. Admitted to PACU. Bilateral groin sites with sutures removed, dressings C/D/I. No bleeding, hematoma, or bruit noted. Bedrest completed x 4 hours. Patient was monitored on telemetry, no acute arrhythmias.   Patient ambulating, tolerating PO intake, and voiding with no issues. Denies any chest pain or SOB. Discharge plans discussed with Dr. Vera. Patient to continue all home except flecainide due to LBBB  and Pradaxa for CVA prophylaxis. Follow up with Dr. Vera in 3 months. Patient was assessed at bedside prior to discharge. Patient reported feeling well and denied chest discomfort, shortness of breath, palpitations, lightheadedness, or any other acute symptoms. Discharge plans/instructions discussed with patient and patiejnt who verbalized understanding and agreement of plans of care. No further questions or concerns voiced at this time. Patient was discharged home in stable condition.     Physical Exam:   Gen: No acute distress, pleasant patient answering questions appropriately  CVS: Regular rate and rhythm, S1S2 normal, no murmurs/rubs/gallops  CHEST: Clear to auscultation bilaterally, no wheezes/rales/ronchi  ABD: Soft, non-tender, nondistended, bowel sounds present  GROINS:Bilateral groin sites soft, non tender, no bleeding, no swelling, no hematoma   Neurologic: Normal strength and tone. No focal numbness or weakness.   Psychiatric: Normal mood and affect. Behavior is normal. Alert and oriented X 3.  EXT: No Edema

## 2025-07-31 NOTE — Clinical Note
The groin was prepped. The site was prepped with ChloraPrep and Ioban. The site was clipped. The patient was draped.

## 2025-07-31 NOTE — PROGRESS NOTES
Pt. Became hypertensive. 188/95 Jas Whittaker MD, Fellow notified . Cleared for discharge via MD as she is not symptomatic.

## 2025-07-31 NOTE — TRANSFER OF CARE
"Anesthesia Transfer of Care Note    Patient: Yeny Hargrove    Procedure(s) Performed: Procedure(s) (LRB):  Ablation atrial fibrillation (N/A)    Patient location: PACU    Anesthesia Type: general    Transport from OR: Transported from OR on 2-3 L/min O2 by NC with adequate spontaneous ventilation    Post pain: adequate analgesia    Post assessment: no apparent anesthetic complications    Post vital signs: stable    Level of consciousness: sedated    Nausea/Vomiting: no nausea/vomiting    Complications: none    Transfer of care protocol was followed      Last vitals: Visit Vitals  /67   Pulse 71   Temp 36.5 °C (97.7 °F) (Temporal)   Resp 20   Ht 5' 4" (1.626 m)   Wt 107.5 kg (237 lb)   LMP  (LMP Unknown)   SpO2 100%   Breastfeeding No   BMI 40.68 kg/m²     "

## 2025-07-31 NOTE — NURSING TRANSFER
Nursing Transfer Note      7/31/2025   3:13 PM    Nurse giving handoff:SHADY Garcia  Nurse receiving handoff:SHADY Damon    Reason patient is being transferred: Post op care     Transfer To: SSCU 12    Transfer via stretcher    Transfer with cardiac monitoring    Transported by SHADY Garcia      Order for Tele Monitor? Yes    Additional Lines: None      Medicines sent: None    Any special needs or follow-up needed: Routine care     Patient belongings transferred with patient: No    Chart send with patient: Yes    Notified: daughter    Patient reassessed at: 1500 7/31/25

## 2025-07-31 NOTE — PLAN OF CARE
Patient arrived to room. PIVs placed. Admit assessment completed. Plan of care discussed with patient. Sacral and heels foams applied. ANES consents done, need procedural consents.

## 2025-07-31 NOTE — ANESTHESIA PREPROCEDURE EVALUATION
"                                                                                                             2025  Yeny Hargrove is a 65 y.o., female.    Pre-operative evaluation for Procedure(s) (LRB):  Ablation atrial fibrillation (N/A)  Transesophageal echo (TAYLA) intra-procedure log documentation (N/A)    Yeny Hargrove is a 65 y.o. female     Problem List[1]    Review of patient's allergies indicates:   Allergen Reactions    Iodine and iodide containing products Swelling    Shellfish containing products Swelling    Shellfish derived Swelling    Iodinated contrast media Swelling    Pcn [penicillins] Swelling       Medications Ordered Prior to Encounter[2]    Past Surgical History:   Procedure Laterality Date    BREAST LUMPECTOMY      ECHOCARDIOGRAM,TRANSESOPHAGEAL N/A 2025    Procedure: Transesophageal echo (TAYLA) intra-procedure log documentation;  Surgeon: Clark Ballard MD;  Location: [facility] EP LAB;  Service: Cardiology;  Laterality: N/A;    HYSTERECTOMY      MASTECTOMY      TREATMENT OF CARDIAC ARRHYTHMIA N/A 3/16/2022    Procedure: Cardioversion or Defibrillation;  Surgeon: Shanika Benavidez MD;  Location: [facility] CATH LAB/EP;  Service: Cardiology;  Laterality: N/A;    TREATMENT OF CARDIAC ARRHYTHMIA N/A 2025    Procedure: Cardioversion or Defibrillation;  Surgeon: Jermaine Vera MD;  Location: [facility] EP LAB;  Service: Cardiology;  Laterality: N/A;  AF, TAYLA, DCCV, MAC, [place], 3 Prep       Social History[3]      CBC: No results for input(s): "WBC", "RBC", "HGB", "HCT", "PLT", "MCV", "MCH", "MCHC" in the last 72 hours.    CMP: No results for input(s): "NA", "K", "CL", "CO2", "BUN", "CREATININE", "GLU", "MG", "PHOS", "CALCIUM", "ALBUMIN", "PROT", "ALKPHOS", "ALT", "AST", "BILITOT" in the last 72 hours.    INR  No results for input(s): "PT", "INR", "PROTIME", "APTT" in the last 72 hours.        Diagnostic Studies:      EKD Echo:  No results found for this or any previous visit.        Pre-op " Assessment    I have reviewed the Patient Summary Reports.    I have reviewed the NPO Status.   I have reviewed the Medications.     Review of Systems  Anesthesia Hx:               Denies Personal Hx of Anesthesia complications.                    Cardiovascular:  Exercise tolerance: poor   Hypertension   CAD    Dysrhythmias atrial fibrillation         ECG has been reviewed.                            Pulmonary:    Asthma                    Neurological:    Denies CVA.    Denies Seizures.                                Endocrine:  Diabetes         Obesity / BMI > 30      Physical Exam  General: Cooperative, Alert and Oriented    Airway:  Mallampati: II   Mouth Opening: Normal  TM Distance: Normal  Tongue: Normal  Neck ROM: Normal ROM    Dental:  Edentulous        Anesthesia Plan  Type of Anesthesia, risks & benefits discussed:    Anesthesia Type: Gen ETT  Intra-op Monitoring Plan: Standard ASA Monitors and Art Line  Post Op Pain Control Plan: IV/PO Opioids PRN and multimodal analgesia  Induction:  IV  Airway Plan: Video, Post-Induction  Informed Consent: Informed consent signed with the Patient and all parties understand the risks and agree with anesthesia plan.  All questions answered.   ASA Score: 3  Day of Surgery Review of History & Physical: H&P Update referred to the surgeon/provider.    Ready For Surgery From Anesthesia Perspective.     .           [1]   Patient Active Problem List  Diagnosis    Malignant neoplasm of upper-inner quadrant of left breast in female, estrogen receptor positive    Estrogen receptor positive    Type 2 diabetes mellitus without complication, without long-term current use of insulin    Other hyperlipidemia    Essential hypertension    Hypertension associated with diabetes    Paroxysmal atrial fibrillation    Moderate persistent asthma    Iron deficiency    Recurrent cancer of left breast    Gastroesophageal reflux disease without esophagitis    Ductal carcinoma in situ (DCIS) of left  "breast    Vitamin D deficiency    Low serum vitamin B12    Morbid obesity   [2]   No current facility-administered medications on file prior to encounter.     Current Outpatient Medications on File Prior to Encounter   Medication Sig Dispense Refill    albuterol (PROVENTIL/VENTOLIN HFA) 90 mcg/actuation inhaler Inhale 2 puffs into the lungs every 6 (six) hours as needed for Wheezing or Shortness of Breath. Rescue 6.7 g 3    amitriptyline (ELAVIL) 150 MG Tab Take 150 mg by mouth every evening.      amlodipine-valsartan (EXFORGE)  mg per tablet Take 1 tablet by mouth once daily. 90 tablet 3    azelastine (ASTELIN) 137 mcg (0.1 %) nasal spray 1 spray (137 mcg total) by Nasal route 2 (two) times daily. 30 mL 0    dabigatran etexilate (PRADAXA) 150 mg Cap Take 1 capsule (150 mg total) by mouth 2 (two) times a day. "Do NOT break, chew, or open capsules." 60 capsule 11    flecainide (TAMBOCOR) 100 MG Tab Take 1 tablet (100 mg total) by mouth every 12 (twelve) hours. 60 tablet 11    fluticasone furoate (ARNUITY ELLIPTA) 100 mcg/actuation inhaler Inhale 1 puff into the lungs once daily. Controller 30 each 11    fluticasone propionate (FLONASE) 50 mcg/actuation nasal spray fluticasone propionate 50 mcg/actuation nasal spray,suspension      ipratropium (ATROVENT) 21 mcg (0.03 %) nasal spray 2 sprays by Each Nostril route 2 (two) times daily. 30 mL 0    metoprolol succinate (TOPROL-XL) 50 MG 24 hr tablet Take 1 tablet (50 mg total) by mouth once daily. 90 tablet 3    albuterol-ipratropium (DUO-NEB) 2.5 mg-0.5 mg/3 mL nebulizer solution Take 3 mLs by nebulization every 6 (six) hours as needed for Wheezing. Rescue 75 mL 2    anastrozole (ARIMIDEX) 1 mg Tab Take 1 tablet (1 mg total) by mouth once daily. 90 tablet 1    cetirizine (ZYRTEC) 10 MG tablet Take 1 tablet (10 mg total) by mouth once daily. 30 tablet 0    diclofenac sodium (VOLTAREN ARTHRITIS PAIN) 1 % Gel Apply 2 g topically 2 (two) times daily. 150 g 1   [3] "   Social History  Socioeconomic History    Marital status: Single   Tobacco Use    Smoking status: Never     Passive exposure: Past    Smokeless tobacco: Never   Vaping Use    Vaping status: Never Used   Substance and Sexual Activity    Alcohol use: No    Drug use: No    Sexual activity: Never     Birth control/protection: None   Social History Narrative    1/16/2025: Lives in [facility] by herself, looking to get a dog. Works in a restaurant kitchen.

## 2025-08-01 LAB
POC ACTIVATED CLOTTING TIME K: 158 SEC (ref 74–137)
POC ACTIVATED CLOTTING TIME K: 164 SEC (ref 74–137)
POC ACTIVATED CLOTTING TIME K: 285 SEC (ref 74–137)
POC ACTIVATED CLOTTING TIME K: 337 SEC (ref 74–137)
SAMPLE: ABNORMAL

## 2025-08-06 ENCOUNTER — TELEPHONE (OUTPATIENT)
Dept: ELECTROPHYSIOLOGY | Facility: CLINIC | Age: 65
End: 2025-08-06
Payer: MEDICARE

## 2025-08-07 ENCOUNTER — TELEPHONE (OUTPATIENT)
Dept: CARDIOLOGY | Facility: CLINIC | Age: 65
End: 2025-08-07
Payer: MEDICARE

## 2025-08-08 ENCOUNTER — TELEPHONE (OUTPATIENT)
Dept: CARDIOLOGY | Facility: CLINIC | Age: 65
End: 2025-08-08
Payer: MEDICARE

## 2025-08-11 ENCOUNTER — LAB VISIT (OUTPATIENT)
Dept: LAB | Facility: HOSPITAL | Age: 65
End: 2025-08-11
Payer: MEDICARE

## 2025-08-11 DIAGNOSIS — E11.9 TYPE 2 DIABETES MELLITUS WITHOUT COMPLICATION, WITHOUT LONG-TERM CURRENT USE OF INSULIN: ICD-10-CM

## 2025-08-11 LAB
ABSOLUTE EOSINOPHIL (OHS): 0.09 K/UL
ABSOLUTE MONOCYTE (OHS): 0.58 K/UL (ref 0.3–1)
ABSOLUTE NEUTROPHIL COUNT (OHS): 2.77 K/UL (ref 1.8–7.7)
ALBUMIN SERPL BCP-MCNC: 4.1 G/DL (ref 3.5–5.2)
ALP SERPL-CCNC: 106 UNIT/L (ref 38–126)
ALT SERPL W/O P-5'-P-CCNC: 13 UNIT/L (ref 10–44)
ANION GAP (OHS): 7 MMOL/L (ref 8–16)
AST SERPL-CCNC: 41 UNIT/L (ref 15–46)
BASOPHILS # BLD AUTO: 0.05 K/UL
BASOPHILS NFR BLD AUTO: 0.8 %
BILIRUB SERPL-MCNC: 0.6 MG/DL (ref 0.1–1)
BUN SERPL-MCNC: 18 MG/DL (ref 7–17)
CALCIUM SERPL-MCNC: 8.7 MG/DL (ref 8.7–10.5)
CHLORIDE SERPL-SCNC: 103 MMOL/L (ref 95–110)
CO2 SERPL-SCNC: 30 MMOL/L (ref 23–29)
CREAT SERPL-MCNC: 1.3 MG/DL (ref 0.5–1.4)
EAG (OHS): 140 MG/DL (ref 68–131)
ERYTHROCYTE [DISTWIDTH] IN BLOOD BY AUTOMATED COUNT: 17.1 % (ref 11.5–14.5)
GFR SERPLBLD CREATININE-BSD FMLA CKD-EPI: 46 ML/MIN/1.73/M2
GLUCOSE SERPL-MCNC: 115 MG/DL (ref 70–110)
HBA1C MFR BLD: 6.5 % (ref 4–5.6)
HCT VFR BLD AUTO: 40.6 % (ref 37–48.5)
HGB BLD-MCNC: 13 GM/DL (ref 12–16)
IMM GRANULOCYTES # BLD AUTO: 0.01 K/UL (ref 0–0.04)
IMM GRANULOCYTES NFR BLD AUTO: 0.2 % (ref 0–0.5)
LYMPHOCYTES # BLD AUTO: 2.77 K/UL (ref 1–4.8)
MCH RBC QN AUTO: 28 PG (ref 27–31)
MCHC RBC AUTO-ENTMCNC: 32 G/DL (ref 32–36)
MCV RBC AUTO: 88 FL (ref 82–98)
NUCLEATED RBC (/100WBC) (OHS): 0 /100 WBC
PLATELET # BLD AUTO: 304 K/UL (ref 150–450)
PMV BLD AUTO: 9.5 FL (ref 9.2–12.9)
POTASSIUM SERPL-SCNC: 4.3 MMOL/L (ref 3.5–5.1)
PROT SERPL-MCNC: 7.7 GM/DL (ref 6–8.4)
RBC # BLD AUTO: 4.64 M/UL (ref 4–5.4)
RELATIVE EOSINOPHIL (OHS): 1.4 %
RELATIVE LYMPHOCYTE (OHS): 44.2 % (ref 18–48)
RELATIVE MONOCYTE (OHS): 9.3 % (ref 4–15)
RELATIVE NEUTROPHIL (OHS): 44.1 % (ref 38–73)
SODIUM SERPL-SCNC: 140 MMOL/L (ref 136–145)
WBC # BLD AUTO: 6.27 K/UL (ref 3.9–12.7)

## 2025-08-11 PROCEDURE — 85025 COMPLETE CBC W/AUTO DIFF WBC: CPT | Mod: PN

## 2025-08-11 PROCEDURE — 36415 COLL VENOUS BLD VENIPUNCTURE: CPT | Mod: PN

## 2025-08-11 PROCEDURE — 83036 HEMOGLOBIN GLYCOSYLATED A1C: CPT | Mod: PN

## 2025-08-11 PROCEDURE — 80053 COMPREHEN METABOLIC PANEL: CPT | Mod: PN

## 2025-08-13 ENCOUNTER — OFFICE VISIT (OUTPATIENT)
Dept: FAMILY MEDICINE | Facility: CLINIC | Age: 65
End: 2025-08-13
Payer: MEDICARE

## 2025-08-13 ENCOUNTER — HOSPITAL ENCOUNTER (OUTPATIENT)
Dept: RADIOLOGY | Facility: HOSPITAL | Age: 65
Discharge: HOME | End: 2025-08-13
Payer: MEDICARE

## 2025-08-13 VITALS
OXYGEN SATURATION: 98 % | HEIGHT: 64 IN | WEIGHT: 238.56 LBS | SYSTOLIC BLOOD PRESSURE: 120 MMHG | BODY MASS INDEX: 40.73 KG/M2 | HEART RATE: 89 BPM | DIASTOLIC BLOOD PRESSURE: 74 MMHG

## 2025-08-13 DIAGNOSIS — E11.9 TYPE 2 DIABETES MELLITUS WITHOUT COMPLICATION, WITHOUT LONG-TERM CURRENT USE OF INSULIN: Primary | ICD-10-CM

## 2025-08-13 DIAGNOSIS — E11.59 HYPERTENSION ASSOCIATED WITH DIABETES: ICD-10-CM

## 2025-08-13 DIAGNOSIS — K21.9 GERD WITHOUT ESOPHAGITIS: ICD-10-CM

## 2025-08-13 DIAGNOSIS — R05.9 COUGH, UNSPECIFIED TYPE: ICD-10-CM

## 2025-08-13 DIAGNOSIS — I15.2 HYPERTENSION ASSOCIATED WITH DIABETES: ICD-10-CM

## 2025-08-13 DIAGNOSIS — Z79.899 LONG-TERM CURRENT USE OF PROTON PUMP INHIBITOR THERAPY: ICD-10-CM

## 2025-08-13 DIAGNOSIS — E78.49 OTHER HYPERLIPIDEMIA: ICD-10-CM

## 2025-08-13 DIAGNOSIS — M54.2 NECK PAIN: ICD-10-CM

## 2025-08-13 DIAGNOSIS — E61.1 IRON DEFICIENCY: ICD-10-CM

## 2025-08-13 DIAGNOSIS — M79.10 MUSCLE TENSION PAIN: ICD-10-CM

## 2025-08-13 PROCEDURE — 1160F RVW MEDS BY RX/DR IN RCRD: CPT | Mod: CPTII,S$GLB,,

## 2025-08-13 PROCEDURE — 3078F DIAST BP <80 MM HG: CPT | Mod: CPTII,S$GLB,,

## 2025-08-13 PROCEDURE — 99214 OFFICE O/P EST MOD 30 MIN: CPT | Mod: S$GLB,,,

## 2025-08-13 PROCEDURE — 3074F SYST BP LT 130 MM HG: CPT | Mod: CPTII,S$GLB,,

## 2025-08-13 PROCEDURE — 87502 INFLUENZA DNA AMP PROBE: CPT | Mod: QW,S$GLB,,

## 2025-08-13 PROCEDURE — 3061F NEG MICROALBUMINURIA REV: CPT | Mod: CPTII,S$GLB,,

## 2025-08-13 PROCEDURE — 71046 X-RAY EXAM CHEST 2 VIEWS: CPT | Mod: 26,,, | Performed by: RADIOLOGY

## 2025-08-13 PROCEDURE — 99999 PR PBB SHADOW E&M-EST. PATIENT-LVL V: CPT | Mod: PBBFAC,,,

## 2025-08-13 PROCEDURE — 3044F HG A1C LEVEL LT 7.0%: CPT | Mod: CPTII,S$GLB,,

## 2025-08-13 PROCEDURE — 87635 SARS-COV-2 COVID-19 AMP PRB: CPT | Mod: QW,S$GLB,,

## 2025-08-13 PROCEDURE — 3288F FALL RISK ASSESSMENT DOCD: CPT | Mod: CPTII,S$GLB,,

## 2025-08-13 PROCEDURE — 71046 X-RAY EXAM CHEST 2 VIEWS: CPT | Mod: TC,PO

## 2025-08-13 PROCEDURE — 3008F BODY MASS INDEX DOCD: CPT | Mod: CPTII,S$GLB,,

## 2025-08-13 PROCEDURE — 4010F ACE/ARB THERAPY RXD/TAKEN: CPT | Mod: CPTII,S$GLB,,

## 2025-08-13 PROCEDURE — 1159F MED LIST DOCD IN RCRD: CPT | Mod: CPTII,S$GLB,,

## 2025-08-13 PROCEDURE — G2211 COMPLEX E/M VISIT ADD ON: HCPCS | Mod: S$GLB,,,

## 2025-08-13 PROCEDURE — 3066F NEPHROPATHY DOC TX: CPT | Mod: CPTII,S$GLB,,

## 2025-08-13 PROCEDURE — 1101F PT FALLS ASSESS-DOCD LE1/YR: CPT | Mod: CPTII,S$GLB,,

## 2025-08-13 RX ORDER — TIZANIDINE 2 MG/1
4 TABLET ORAL EVERY 8 HOURS PRN
Qty: 30 TABLET | Refills: 0 | Status: SHIPPED | OUTPATIENT
Start: 2025-08-13 | End: 2025-08-23

## 2025-08-14 LAB
CTP QC/QA: YES
CTP QC/QA: YES
POC MOLECULAR INFLUENZA A AGN: NEGATIVE
POC MOLECULAR INFLUENZA B AGN: NEGATIVE
SARS-COV-2 RDRP RESP QL NAA+PROBE: NEGATIVE

## 2025-08-27 ENCOUNTER — LAB VISIT (OUTPATIENT)
Dept: LAB | Facility: HOSPITAL | Age: 65
End: 2025-08-27
Payer: MEDICARE

## 2025-08-27 DIAGNOSIS — E78.49 OTHER HYPERLIPIDEMIA: ICD-10-CM

## 2025-08-27 DIAGNOSIS — K21.9 GERD WITHOUT ESOPHAGITIS: ICD-10-CM

## 2025-08-27 DIAGNOSIS — E11.9 TYPE 2 DIABETES MELLITUS WITHOUT COMPLICATION, WITHOUT LONG-TERM CURRENT USE OF INSULIN: ICD-10-CM

## 2025-08-27 DIAGNOSIS — Z79.899 LONG-TERM CURRENT USE OF PROTON PUMP INHIBITOR THERAPY: ICD-10-CM

## 2025-08-27 DIAGNOSIS — E61.1 IRON DEFICIENCY: ICD-10-CM

## 2025-08-27 LAB
ALBUMIN SERPL BCP-MCNC: 3.7 G/DL (ref 3.5–5.2)
ALP SERPL-CCNC: 98 UNIT/L (ref 38–126)
ALT SERPL W/O P-5'-P-CCNC: 11 UNIT/L (ref 10–44)
ANION GAP (OHS): 5 MMOL/L (ref 8–16)
AST SERPL-CCNC: 17 UNIT/L (ref 15–46)
BILIRUB SERPL-MCNC: 0.2 MG/DL (ref 0.1–1)
BUN SERPL-MCNC: 9 MG/DL (ref 7–17)
CALCIUM SERPL-MCNC: 8.5 MG/DL (ref 8.7–10.5)
CHLORIDE SERPL-SCNC: 105 MMOL/L (ref 95–110)
CHOLEST SERPL-MCNC: 179 MG/DL (ref 120–199)
CHOLEST/HDLC SERPL: 3.4 {RATIO} (ref 2–5)
CO2 SERPL-SCNC: 31 MMOL/L (ref 23–29)
CREAT SERPL-MCNC: 0.9 MG/DL (ref 0.5–1.4)
FERRITIN SERPL-MCNC: 46 NG/ML (ref 20–300)
GFR SERPLBLD CREATININE-BSD FMLA CKD-EPI: >60 ML/MIN/1.73/M2
GLUCOSE SERPL-MCNC: 112 MG/DL (ref 70–110)
HDLC SERPL-MCNC: 53 MG/DL (ref 40–75)
HDLC SERPL: 29.6 % (ref 20–50)
LDLC SERPL CALC-MCNC: 111.4 MG/DL (ref 63–159)
NONHDLC SERPL-MCNC: 126 MG/DL
POTASSIUM SERPL-SCNC: 4 MMOL/L (ref 3.5–5.1)
PROT SERPL-MCNC: 7.1 GM/DL (ref 6–8.4)
SODIUM SERPL-SCNC: 141 MMOL/L (ref 136–145)
TRIGL SERPL-MCNC: 73 MG/DL (ref 30–150)
TSH SERPL-ACNC: 2.31 UIU/ML (ref 0.4–4)

## 2025-08-27 PROCEDURE — 82607 VITAMIN B-12: CPT | Mod: PN

## 2025-08-27 PROCEDURE — 84466 ASSAY OF TRANSFERRIN: CPT | Mod: PN

## 2025-08-27 PROCEDURE — 83718 ASSAY OF LIPOPROTEIN: CPT | Mod: PN

## 2025-08-27 PROCEDURE — 80053 COMPREHEN METABOLIC PANEL: CPT | Mod: PN

## 2025-08-27 PROCEDURE — 82728 ASSAY OF FERRITIN: CPT | Mod: PN

## 2025-08-27 PROCEDURE — 84443 ASSAY THYROID STIM HORMONE: CPT | Mod: PN

## 2025-08-27 PROCEDURE — 82306 VITAMIN D 25 HYDROXY: CPT | Mod: PN

## 2025-08-27 PROCEDURE — 36415 COLL VENOUS BLD VENIPUNCTURE: CPT | Mod: PN

## 2025-08-28 LAB
25(OH)D3+25(OH)D2 SERPL-MCNC: 26 NG/ML (ref 30–96)
IRON SATN MFR SERPL: 15 % (ref 20–50)
IRON SERPL-MCNC: 45 UG/DL (ref 30–160)
TIBC SERPL-MCNC: 309 UG/DL (ref 250–450)
TRANSFERRIN SERPL-MCNC: 209 MG/DL (ref 200–375)
VIT B12 SERPL-MCNC: 350 PG/ML (ref 210–950)

## 2025-09-05 ENCOUNTER — HOSPITAL ENCOUNTER (OUTPATIENT)
Dept: CARDIOLOGY | Facility: HOSPITAL | Age: 65
Discharge: HOME | End: 2025-09-05
Attending: INTERNAL MEDICINE
Payer: MEDICARE

## 2025-09-05 VITALS — HEIGHT: 64 IN | WEIGHT: 238 LBS | BODY MASS INDEX: 40.63 KG/M2

## 2025-09-05 DIAGNOSIS — I48.91 ATRIAL FIBRILLATION, UNSPECIFIED TYPE: ICD-10-CM

## 2025-09-05 LAB
AORTIC SIZE INDEX (SOV): 1.5 CM/M2
AORTIC SIZE INDEX: 1.6 CM/M2
APICAL FOUR CHAMBER EJECTION FRACTION: 42 %
APICAL TWO CHAMBER EJECTION FRACTION: 64 %
ASCENDING AORTA: 3.3 CM
AV INDEX (PROSTH): 0.8
AV MEAN GRADIENT: 5 MMHG
AV PEAK GRADIENT: 10 MMHG
AV REGURGITATION PRESSURE HALF TIME: 358 MS
AV VALVE AREA BY VELOCITY RATIO: 2.8 CM²
AV VALVE AREA: 3 CM²
AV VELOCITY RATIO: 0.75
BSA FOR ECHO PROCEDURE: 2.21 M2
CV ECHO LV RWT: 0.33 CM
DOP CALC AO PEAK VEL: 1.6 M/S
DOP CALC AO VTI: 32.2 CM
DOP CALC LVOT AREA: 3.8 CM2
DOP CALC LVOT DIAMETER: 2.2 CM
DOP CALC LVOT PEAK VEL: 1.2 M/S
DOP CALC MV VTI: 20.9 CM
DOP CALCLVOT PEAK VEL VTI: 25.8 CM
ECHO LV POSTERIOR WALL: 0.8 CM (ref 0.6–1.1)
FRACTIONAL SHORTENING: 29.2 % (ref 28–44)
INTERVENTRICULAR SEPTUM: 0.8 CM (ref 0.6–1.1)
IVC DIAMETER: 1.42 CM
LEFT ATRIUM AREA SYSTOLIC (APICAL 2 CHAMBER): 19.06 CM2
LEFT ATRIUM AREA SYSTOLIC (APICAL 4 CHAMBER): 20.41 CM2
LEFT ATRIUM VOLUME INDEX MOD: 26 ML/M2
LEFT ATRIUM VOLUME MOD: 55 ML
LEFT INTERNAL DIMENSION IN SYSTOLE: 3.4 CM (ref 2.1–4)
LEFT VENTRICLE DIASTOLIC VOLUME INDEX: 50.24 ML/M2
LEFT VENTRICLE DIASTOLIC VOLUME: 106 ML
LEFT VENTRICLE END DIASTOLIC VOLUME APICAL 2 CHAMBER: 69.17 ML
LEFT VENTRICLE END DIASTOLIC VOLUME APICAL 4 CHAMBER INDEX BSA: 32.95 ML/M2
LEFT VENTRICLE END DIASTOLIC VOLUME APICAL 4 CHAMBER: 69.53 ML
LEFT VENTRICLE END SYSTOLIC VOLUME APICAL 2 CHAMBER: 53.23 ML
LEFT VENTRICLE END SYSTOLIC VOLUME APICAL 4 CHAMBER: 56.98 ML
LEFT VENTRICLE MASS INDEX: 60 G/M2
LEFT VENTRICLE SYSTOLIC VOLUME INDEX: 22.3 ML/M2
LEFT VENTRICLE SYSTOLIC VOLUME: 47 ML
LEFT VENTRICULAR INTERNAL DIMENSION IN DIASTOLE: 4.8 CM (ref 3.5–6)
LEFT VENTRICULAR MASS: 126.7 G
LVED V (TEICH): 106.08 ML
LVES V (TEICH): 46.82 ML
LVOT MG: 2.98 MMHG
LVOT MV: 0.8 CM/S
Lab: 1.9 CM/M
Lab: 2 CM/M
MV MEAN GRADIENT: 3 MMHG
MV PEAK GRADIENT: 4 MMHG
MV VALVE AREA BY CONTINUITY EQUATION: 4.69 CM2
OHS CV CPX PATIENT HEIGHT IN: 64
OHS CV RV/LV RATIO: 0.67 CM
OHS LV EJECTION FRACTION SIMPSONS BIPLANE MOD: 57 %
PISA AR MAX VEL: 5.08 M/S
PISA MRMAX VEL: 5.05 M/S
PISA TR MAX VEL: 0.1 M/S
PULM VEIN S/D RATIO: 1.26
PV PEAK D VEL: 0.31 M/S
PV PEAK GRADIENT: 4 MMHG
PV PEAK S VEL: 0.39 M/S
PV PEAK VELOCITY: 1.01 M/S
RA VOL SYS: 29.27 ML
RIGHT ATRIAL AREA: 13.5 CM2
RIGHT ATRIUM END SYSTOLIC VOLUME APICAL 4 CHAMBER INDEX BSA: 14 ML/M2
RIGHT ATRIUM VOLUME AREA LENGTH APICAL 4 CHAMBER: 30.4 ML
RIGHT VENTRICLE DIASTOLIC BASEL DIMENSION: 3.2 CM
SINUS: 3.1 CM
STJ: 3 CM
TDI LATERAL: 0.08 M/S
TDI SEPTAL: 0.07 M/S
TDI: 0.08 M/S
TR MAX PG: 0 MMHG
TRICUSPID ANNULAR PLANE SYSTOLIC EXCURSION: 1.5 CM
Z-SCORE OF LEFT VENTRICULAR DIMENSION IN END DIASTOLE: -3.2
Z-SCORE OF LEFT VENTRICULAR DIMENSION IN END SYSTOLE: -1.36

## 2025-09-05 PROCEDURE — 93306 TTE W/DOPPLER COMPLETE: CPT | Mod: PN

## (undated) DEVICE — R CATH ACUSON ACUNAV 8FR

## (undated) DEVICE — TOWEL OR DISP STRL BLUE 4/PK

## (undated) DEVICE — GUIDEWIRE ROSEN VAS JTIP 180CM

## (undated) DEVICE — PAD DEFIB CADENCE ADULT R2

## (undated) DEVICE — NDL NRG TRNSPTAL 71CM

## (undated) DEVICE — Device

## (undated) DEVICE — DILATOR VES COONS .038X16X20CM

## (undated) DEVICE — SHEATH STEERABLE CLEAR

## (undated) DEVICE — BASIN SPLASH SHLD W/PAD BLUE

## (undated) DEVICE — LINE PRESSURE MONITORING 96IN

## (undated) DEVICE — KIT ENSITE ELECTRODE SURFACE

## (undated) DEVICE — DILATOR COONS TAPER 14FR

## (undated) DEVICE — SHEATH HEMOSTASIS 8.5FR

## (undated) DEVICE — SHEATH 12FR 12CM

## (undated) DEVICE — PACK EP DRAPE OMC

## (undated) DEVICE — R CATH BIDIRECTIONL DF CRV 7FR

## (undated) DEVICE — ELECTRODE REM PLYHSV RETURN 9

## (undated) DEVICE — CATH ABLATION PULS FIELD 31MM

## (undated) DEVICE — COVER PRB TRNSDUC 7.6X183CM

## (undated) DEVICE — SHEATH INTRODUCER 9FR 11CM

## (undated) DEVICE — INTRO FAST-CATH SL1 8.5FR 63CM

## (undated) DEVICE — KIT PROBE COVER WITH GEL

## (undated) DEVICE — INTRODUCER HEMOSTASIS 7.5F